# Patient Record
Sex: MALE | Race: OTHER | Employment: FULL TIME | URBAN - METROPOLITAN AREA
[De-identification: names, ages, dates, MRNs, and addresses within clinical notes are randomized per-mention and may not be internally consistent; named-entity substitution may affect disease eponyms.]

---

## 2017-06-06 ENCOUNTER — OFFICE VISIT (OUTPATIENT)
Dept: FAMILY MEDICINE CLINIC | Age: 42
End: 2017-06-06
Payer: MEDICARE

## 2017-06-06 VITALS
HEIGHT: 64 IN | BODY MASS INDEX: 28 KG/M2 | SYSTOLIC BLOOD PRESSURE: 102 MMHG | WEIGHT: 164 LBS | DIASTOLIC BLOOD PRESSURE: 60 MMHG

## 2017-06-06 DIAGNOSIS — M76.32 IT BAND SYNDROME, LEFT: Primary | ICD-10-CM

## 2017-06-06 DIAGNOSIS — I10 ESSENTIAL HYPERTENSION: ICD-10-CM

## 2017-06-06 DIAGNOSIS — J30.1 SEASONAL ALLERGIC RHINITIS DUE TO POLLEN: ICD-10-CM

## 2017-06-06 DIAGNOSIS — M79.605 PAIN OF LEFT LOWER EXTREMITY: ICD-10-CM

## 2017-06-06 PROCEDURE — 99214 OFFICE O/P EST MOD 30 MIN: CPT | Performed by: FAMILY MEDICINE

## 2017-06-06 PROCEDURE — G8419 CALC BMI OUT NRM PARAM NOF/U: HCPCS | Performed by: FAMILY MEDICINE

## 2017-06-06 PROCEDURE — G8427 DOCREV CUR MEDS BY ELIG CLIN: HCPCS | Performed by: FAMILY MEDICINE

## 2017-06-06 PROCEDURE — 1036F TOBACCO NON-USER: CPT | Performed by: FAMILY MEDICINE

## 2017-06-06 RX ORDER — FEXOFENADINE HCL 180 MG/1
180 TABLET ORAL DAILY
Qty: 30 TABLET | Refills: 2 | Status: SHIPPED | OUTPATIENT
Start: 2017-06-06 | End: 2018-04-09 | Stop reason: ALTCHOICE

## 2017-06-06 RX ORDER — FLUTICASONE PROPIONATE 50 MCG
2 SPRAY, SUSPENSION (ML) NASAL DAILY
Qty: 1 BOTTLE | Refills: 3 | Status: SHIPPED | OUTPATIENT
Start: 2017-06-06 | End: 2018-04-09 | Stop reason: ALTCHOICE

## 2017-06-09 RX ORDER — METOPROLOL SUCCINATE 100 MG/1
100 TABLET, EXTENDED RELEASE ORAL DAILY
Qty: 30 TABLET | Refills: 3
Start: 2017-06-09 | End: 2018-04-09 | Stop reason: SDUPTHER

## 2017-06-09 ASSESSMENT — ENCOUNTER SYMPTOMS
RESPIRATORY NEGATIVE: 1
GASTROINTESTINAL NEGATIVE: 1

## 2017-06-20 ENCOUNTER — HOSPITAL ENCOUNTER (OUTPATIENT)
Dept: PHYSICAL THERAPY | Age: 42
Setting detail: THERAPIES SERIES
Discharge: HOME | End: 2017-06-20
Payer: MEDICARE

## 2017-06-20 PROCEDURE — 97161 PT EVAL LOW COMPLEX 20 MIN: CPT

## 2017-06-20 PROCEDURE — G8978 MOBILITY CURRENT STATUS: HCPCS

## 2017-06-20 PROCEDURE — G8979 MOBILITY GOAL STATUS: HCPCS

## 2017-06-27 ENCOUNTER — HOSPITAL ENCOUNTER (OUTPATIENT)
Dept: PHYSICAL THERAPY | Age: 42
Setting detail: THERAPIES SERIES
Discharge: HOME | End: 2017-06-27
Payer: MEDICARE

## 2017-06-27 PROCEDURE — 97110 THERAPEUTIC EXERCISES: CPT

## 2017-06-27 PROCEDURE — 97140 MANUAL THERAPY 1/> REGIONS: CPT

## 2017-06-29 ENCOUNTER — HOSPITAL ENCOUNTER (OUTPATIENT)
Dept: PHYSICAL THERAPY | Age: 42
Setting detail: THERAPIES SERIES
Discharge: HOME | End: 2017-06-29
Payer: MEDICARE

## 2017-07-06 ENCOUNTER — HOSPITAL ENCOUNTER (OUTPATIENT)
Dept: PHYSICAL THERAPY | Age: 42
Setting detail: THERAPIES SERIES
Discharge: HOME | End: 2017-07-06
Payer: MEDICARE

## 2017-07-06 PROCEDURE — 97140 MANUAL THERAPY 1/> REGIONS: CPT

## 2017-07-06 PROCEDURE — 97110 THERAPEUTIC EXERCISES: CPT

## 2017-07-13 ENCOUNTER — HOSPITAL ENCOUNTER (OUTPATIENT)
Dept: PHYSICAL THERAPY | Age: 42
Setting detail: THERAPIES SERIES
Discharge: HOME | End: 2017-07-13
Payer: MEDICARE

## 2017-07-13 PROCEDURE — 97110 THERAPEUTIC EXERCISES: CPT

## 2017-07-18 ENCOUNTER — OFFICE VISIT (OUTPATIENT)
Dept: FAMILY MEDICINE CLINIC | Age: 42
End: 2017-07-18
Payer: MEDICARE

## 2017-07-18 VITALS
BODY MASS INDEX: 27.14 KG/M2 | HEART RATE: 68 BPM | HEIGHT: 64 IN | SYSTOLIC BLOOD PRESSURE: 154 MMHG | WEIGHT: 159 LBS | DIASTOLIC BLOOD PRESSURE: 88 MMHG

## 2017-07-18 DIAGNOSIS — R20.8 BURNING SENSATION OF FEET: ICD-10-CM

## 2017-07-18 DIAGNOSIS — I10 ESSENTIAL HYPERTENSION: Primary | ICD-10-CM

## 2017-07-18 DIAGNOSIS — M54.16 LEFT LUMBAR RADICULOPATHY: ICD-10-CM

## 2017-07-18 PROCEDURE — G8427 DOCREV CUR MEDS BY ELIG CLIN: HCPCS | Performed by: FAMILY MEDICINE

## 2017-07-18 PROCEDURE — 99214 OFFICE O/P EST MOD 30 MIN: CPT | Performed by: FAMILY MEDICINE

## 2017-07-18 PROCEDURE — G8419 CALC BMI OUT NRM PARAM NOF/U: HCPCS | Performed by: FAMILY MEDICINE

## 2017-07-18 PROCEDURE — 1036F TOBACCO NON-USER: CPT | Performed by: FAMILY MEDICINE

## 2017-07-18 ASSESSMENT — ENCOUNTER SYMPTOMS: RESPIRATORY NEGATIVE: 1

## 2017-07-20 ENCOUNTER — HOSPITAL ENCOUNTER (OUTPATIENT)
Dept: PHYSICAL THERAPY | Age: 42
Setting detail: THERAPIES SERIES
Discharge: HOME | End: 2017-07-20
Payer: MEDICARE

## 2017-07-25 ENCOUNTER — HOSPITAL ENCOUNTER (OUTPATIENT)
Dept: PHYSICAL THERAPY | Age: 42
Setting detail: THERAPIES SERIES
Discharge: HOME | End: 2017-07-25
Payer: MEDICARE

## 2017-07-25 PROCEDURE — G8979 MOBILITY GOAL STATUS: HCPCS

## 2017-07-25 PROCEDURE — G8980 MOBILITY D/C STATUS: HCPCS

## 2017-07-25 PROCEDURE — 97110 THERAPEUTIC EXERCISES: CPT

## 2017-07-27 ENCOUNTER — HOSPITAL ENCOUNTER (OUTPATIENT)
Dept: PHYSICAL THERAPY | Age: 42
Setting detail: THERAPIES SERIES
End: 2017-07-27
Payer: MEDICARE

## 2018-04-09 ENCOUNTER — HOSPITAL ENCOUNTER (OUTPATIENT)
Age: 43
Discharge: HOME | End: 2018-04-11
Payer: MEDICARE

## 2018-04-09 ENCOUNTER — OFFICE VISIT (OUTPATIENT)
Dept: FAMILY MEDICINE CLINIC | Age: 43
End: 2018-04-09
Payer: MEDICARE

## 2018-04-09 ENCOUNTER — HOSPITAL ENCOUNTER (OUTPATIENT)
Dept: GENERAL RADIOLOGY | Age: 43
Discharge: HOME | End: 2018-04-11
Payer: MEDICARE

## 2018-04-09 VITALS
BODY MASS INDEX: 27.49 KG/M2 | SYSTOLIC BLOOD PRESSURE: 162 MMHG | DIASTOLIC BLOOD PRESSURE: 96 MMHG | WEIGHT: 161 LBS | OXYGEN SATURATION: 97 % | HEART RATE: 71 BPM | HEIGHT: 64 IN

## 2018-04-09 DIAGNOSIS — L29.9 ITCHING: ICD-10-CM

## 2018-04-09 DIAGNOSIS — I10 ESSENTIAL HYPERTENSION: ICD-10-CM

## 2018-04-09 DIAGNOSIS — R19.4 CHANGE IN STOOL HABITS: ICD-10-CM

## 2018-04-09 DIAGNOSIS — R19.4 CHANGE IN STOOL HABITS: Primary | ICD-10-CM

## 2018-04-09 DIAGNOSIS — Z99.2 ESRD (END STAGE RENAL DISEASE) ON DIALYSIS (HCC): ICD-10-CM

## 2018-04-09 DIAGNOSIS — N18.6 ESRD (END STAGE RENAL DISEASE) ON DIALYSIS (HCC): ICD-10-CM

## 2018-04-09 DIAGNOSIS — L82.1 SEBORRHEIC KERATOSIS: ICD-10-CM

## 2018-04-09 PROCEDURE — 17110 DESTRUCTION B9 LES UP TO 14: CPT | Performed by: FAMILY MEDICINE

## 2018-04-09 PROCEDURE — 1036F TOBACCO NON-USER: CPT | Performed by: FAMILY MEDICINE

## 2018-04-09 PROCEDURE — G8427 DOCREV CUR MEDS BY ELIG CLIN: HCPCS | Performed by: FAMILY MEDICINE

## 2018-04-09 PROCEDURE — G8419 CALC BMI OUT NRM PARAM NOF/U: HCPCS | Performed by: FAMILY MEDICINE

## 2018-04-09 PROCEDURE — 74018 RADEX ABDOMEN 1 VIEW: CPT

## 2018-04-09 PROCEDURE — 99214 OFFICE O/P EST MOD 30 MIN: CPT | Performed by: FAMILY MEDICINE

## 2018-04-09 RX ORDER — AMOXICILLIN 250 MG
CAPSULE ORAL
Qty: 33 TABLET | Refills: 0 | Status: SHIPPED | OUTPATIENT
Start: 2018-04-09 | End: 2019-11-19

## 2018-04-09 RX ORDER — RANITIDINE 150 MG/1
150 TABLET ORAL 2 TIMES DAILY PRN
Qty: 60 TABLET | Refills: 3 | Status: SHIPPED | OUTPATIENT
Start: 2018-04-09 | End: 2019-11-19 | Stop reason: ALTCHOICE

## 2018-04-09 RX ORDER — METOPROLOL SUCCINATE 100 MG/1
100 TABLET, EXTENDED RELEASE ORAL DAILY
Qty: 30 TABLET | Refills: 5 | Status: SHIPPED | OUTPATIENT
Start: 2018-04-09 | End: 2019-06-17 | Stop reason: SDUPTHER

## 2018-04-09 RX ORDER — AMLODIPINE BESYLATE 10 MG/1
10 TABLET ORAL DAILY
Qty: 30 TABLET | Refills: 5 | Status: SHIPPED | OUTPATIENT
Start: 2018-04-09 | End: 2019-06-17 | Stop reason: SDUPTHER

## 2018-04-09 ASSESSMENT — PATIENT HEALTH QUESTIONNAIRE - PHQ9
SUM OF ALL RESPONSES TO PHQ9 QUESTIONS 1 & 2: 0
1. LITTLE INTEREST OR PLEASURE IN DOING THINGS: 0
2. FEELING DOWN, DEPRESSED OR HOPELESS: 0
SUM OF ALL RESPONSES TO PHQ QUESTIONS 1-9: 0

## 2018-04-09 ASSESSMENT — ENCOUNTER SYMPTOMS: RESPIRATORY NEGATIVE: 1

## 2018-04-10 ENCOUNTER — TELEPHONE (OUTPATIENT)
Dept: FAMILY MEDICINE CLINIC | Age: 43
End: 2018-04-10

## 2018-10-08 ENCOUNTER — OFFICE VISIT (OUTPATIENT)
Dept: FAMILY MEDICINE CLINIC | Age: 43
End: 2018-10-08
Payer: MEDICARE

## 2018-10-08 VITALS
DIASTOLIC BLOOD PRESSURE: 72 MMHG | BODY MASS INDEX: 28 KG/M2 | HEIGHT: 64 IN | WEIGHT: 164 LBS | SYSTOLIC BLOOD PRESSURE: 130 MMHG

## 2018-10-08 DIAGNOSIS — R21 FACIAL RASH: ICD-10-CM

## 2018-10-08 DIAGNOSIS — L98.9 SKIN LESION OF FACE: ICD-10-CM

## 2018-10-08 DIAGNOSIS — N18.6 ESRD (END STAGE RENAL DISEASE) ON DIALYSIS (HCC): ICD-10-CM

## 2018-10-08 DIAGNOSIS — Z99.2 ESRD (END STAGE RENAL DISEASE) ON DIALYSIS (HCC): ICD-10-CM

## 2018-10-08 DIAGNOSIS — I10 ESSENTIAL HYPERTENSION: Primary | ICD-10-CM

## 2018-10-08 PROCEDURE — 1036F TOBACCO NON-USER: CPT | Performed by: FAMILY MEDICINE

## 2018-10-08 PROCEDURE — G8484 FLU IMMUNIZE NO ADMIN: HCPCS | Performed by: FAMILY MEDICINE

## 2018-10-08 PROCEDURE — 99214 OFFICE O/P EST MOD 30 MIN: CPT | Performed by: FAMILY MEDICINE

## 2018-10-08 PROCEDURE — G8427 DOCREV CUR MEDS BY ELIG CLIN: HCPCS | Performed by: FAMILY MEDICINE

## 2018-10-08 PROCEDURE — G8419 CALC BMI OUT NRM PARAM NOF/U: HCPCS | Performed by: FAMILY MEDICINE

## 2018-10-08 RX ORDER — RANITIDINE 150 MG/1
150 TABLET ORAL 2 TIMES DAILY PRN
Qty: 60 TABLET | Refills: 3 | Status: CANCELLED | OUTPATIENT
Start: 2018-10-08

## 2018-10-08 RX ORDER — METOPROLOL SUCCINATE 100 MG/1
100 TABLET, EXTENDED RELEASE ORAL DAILY
Qty: 30 TABLET | Refills: 5 | Status: CANCELLED | OUTPATIENT
Start: 2018-10-08

## 2018-10-08 RX ORDER — AMOXICILLIN 250 MG
CAPSULE ORAL
Qty: 60 TABLET | Refills: 1 | Status: CANCELLED | OUTPATIENT
Start: 2018-10-08

## 2018-10-08 RX ORDER — AMLODIPINE BESYLATE 10 MG/1
10 TABLET ORAL DAILY
Qty: 30 TABLET | Refills: 5 | Status: CANCELLED | OUTPATIENT
Start: 2018-10-08

## 2018-10-08 ASSESSMENT — PATIENT HEALTH QUESTIONNAIRE - PHQ9
2. FEELING DOWN, DEPRESSED OR HOPELESS: 0
SUM OF ALL RESPONSES TO PHQ QUESTIONS 1-9: 0
1. LITTLE INTEREST OR PLEASURE IN DOING THINGS: 0
SUM OF ALL RESPONSES TO PHQ QUESTIONS 1-9: 0
SUM OF ALL RESPONSES TO PHQ9 QUESTIONS 1 & 2: 0

## 2018-10-09 ASSESSMENT — ENCOUNTER SYMPTOMS
RESPIRATORY NEGATIVE: 1
GASTROINTESTINAL NEGATIVE: 1

## 2019-02-09 ENCOUNTER — HOSPITAL ENCOUNTER (OUTPATIENT)
Age: 44
Discharge: HOME | End: 2019-02-09
Payer: MEDICARE

## 2019-02-09 ENCOUNTER — TELEPHONE (OUTPATIENT)
Dept: FAMILY MEDICINE CLINIC | Age: 44
End: 2019-02-09

## 2019-02-09 DIAGNOSIS — N18.6 ESRD (END STAGE RENAL DISEASE) ON DIALYSIS (HCC): ICD-10-CM

## 2019-02-09 DIAGNOSIS — I10 ESSENTIAL HYPERTENSION: ICD-10-CM

## 2019-02-09 DIAGNOSIS — Z99.2 ESRD (END STAGE RENAL DISEASE) ON DIALYSIS (HCC): ICD-10-CM

## 2019-02-09 LAB
ABSOLUTE EOS #: 0.1 K/UL (ref 0–0.4)
ABSOLUTE IMMATURE GRANULOCYTE: ABNORMAL K/UL (ref 0–0.3)
ABSOLUTE LYMPH #: 1.4 K/UL (ref 1–4.8)
ABSOLUTE MONO #: 0.5 K/UL (ref 0–1)
ALBUMIN SERPL-MCNC: 4.6 G/DL (ref 3.5–5.2)
ALBUMIN/GLOBULIN RATIO: ABNORMAL (ref 1–2.5)
ALP BLD-CCNC: 75 U/L (ref 40–129)
ALT SERPL-CCNC: 17 U/L (ref 5–41)
ANION GAP SERPL CALCULATED.3IONS-SCNC: 14 MMOL/L (ref 9–17)
AST SERPL-CCNC: 11 U/L
BASOPHILS # BLD: 1 % (ref 0–2)
BASOPHILS ABSOLUTE: 0 K/UL (ref 0–0.2)
BILIRUB SERPL-MCNC: 0.59 MG/DL (ref 0.3–1.2)
BUN BLDV-MCNC: 37 MG/DL (ref 6–20)
BUN/CREAT BLD: 4 (ref 9–20)
CALCIUM SERPL-MCNC: 9.7 MG/DL (ref 8.6–10.4)
CHLORIDE BLD-SCNC: 93 MMOL/L (ref 98–107)
CHOLESTEROL/HDL RATIO: 3.9
CHOLESTEROL: 153 MG/DL
CO2: 30 MMOL/L (ref 20–31)
CREAT SERPL-MCNC: 9.68 MG/DL (ref 0.7–1.2)
DIFFERENTIAL TYPE: YES
EOSINOPHILS RELATIVE PERCENT: 2 % (ref 0–5)
GFR AFRICAN AMERICAN: 7 ML/MIN
GFR NON-AFRICAN AMERICAN: 6 ML/MIN
GFR SERPL CREATININE-BSD FRML MDRD: ABNORMAL ML/MIN/{1.73_M2}
GFR SERPL CREATININE-BSD FRML MDRD: ABNORMAL ML/MIN/{1.73_M2}
GLUCOSE BLD-MCNC: 96 MG/DL (ref 70–99)
HCT VFR BLD CALC: 45.5 % (ref 41–53)
HDLC SERPL-MCNC: 39 MG/DL
HEMOGLOBIN: 14.7 G/DL (ref 13.5–17.5)
IMMATURE GRANULOCYTES: ABNORMAL %
LDL CHOLESTEROL: 58 MG/DL (ref 0–130)
LYMPHOCYTES # BLD: 23 % (ref 13–44)
MCH RBC QN AUTO: 30.7 PG (ref 26–34)
MCHC RBC AUTO-ENTMCNC: 32.2 G/DL (ref 31–37)
MCV RBC AUTO: 95.1 FL (ref 80–100)
MONOCYTES # BLD: 8 % (ref 5–9)
NRBC AUTOMATED: ABNORMAL PER 100 WBC
PATIENT FASTING?: YES
PDW BLD-RTO: 15.4 % (ref 12.1–15.2)
PLATELET # BLD: 236 K/UL (ref 140–450)
PLATELET ESTIMATE: ABNORMAL
PMV BLD AUTO: ABNORMAL FL (ref 6–12)
POTASSIUM SERPL-SCNC: 5.1 MMOL/L (ref 3.7–5.3)
RBC # BLD: 4.78 M/UL (ref 4.5–5.9)
RBC # BLD: ABNORMAL 10*6/UL
SEG NEUTROPHILS: 66 % (ref 39–75)
SEGMENTED NEUTROPHILS ABSOLUTE COUNT: 4.2 K/UL (ref 2.1–6.5)
SODIUM BLD-SCNC: 137 MMOL/L (ref 135–144)
TOTAL PROTEIN: 9.1 G/DL (ref 6.4–8.3)
TRIGL SERPL-MCNC: 282 MG/DL
TSH SERPL DL<=0.05 MIU/L-ACNC: 1.18 MIU/L (ref 0.3–5)
VLDLC SERPL CALC-MCNC: ABNORMAL MG/DL (ref 1–30)
WBC # BLD: 6.2 K/UL (ref 3.5–11)
WBC # BLD: ABNORMAL 10*3/UL

## 2019-02-09 PROCEDURE — 85025 COMPLETE CBC W/AUTO DIFF WBC: CPT

## 2019-02-09 PROCEDURE — 84443 ASSAY THYROID STIM HORMONE: CPT

## 2019-02-09 PROCEDURE — 80053 COMPREHEN METABOLIC PANEL: CPT

## 2019-02-09 PROCEDURE — 80061 LIPID PANEL: CPT

## 2019-02-09 PROCEDURE — 36415 COLL VENOUS BLD VENIPUNCTURE: CPT

## 2019-05-21 ENCOUNTER — OFFICE VISIT (OUTPATIENT)
Dept: FAMILY MEDICINE CLINIC | Age: 44
End: 2019-05-21
Payer: MEDICARE

## 2019-05-21 VITALS
SYSTOLIC BLOOD PRESSURE: 130 MMHG | HEIGHT: 64 IN | WEIGHT: 163 LBS | DIASTOLIC BLOOD PRESSURE: 80 MMHG | BODY MASS INDEX: 27.83 KG/M2

## 2019-05-21 DIAGNOSIS — R25.2 MUSCLE CRAMP: ICD-10-CM

## 2019-05-21 DIAGNOSIS — R73.09 ABNORMAL GLUCOSE: ICD-10-CM

## 2019-05-21 DIAGNOSIS — Z99.2 END STAGE RENAL FAILURE ON DIALYSIS (HCC): ICD-10-CM

## 2019-05-21 DIAGNOSIS — R20.2 PARESTHESIA: Primary | ICD-10-CM

## 2019-05-21 DIAGNOSIS — N18.5 CHRONIC KIDNEY DISEASE, STAGE V (HCC): ICD-10-CM

## 2019-05-21 DIAGNOSIS — E55.9 VITAMIN D DEFICIENCY: ICD-10-CM

## 2019-05-21 DIAGNOSIS — I10 ESSENTIAL HYPERTENSION: ICD-10-CM

## 2019-05-21 DIAGNOSIS — Z86.39 HISTORY OF HYPERPARATHYROIDISM: ICD-10-CM

## 2019-05-21 DIAGNOSIS — N18.6 END STAGE RENAL FAILURE ON DIALYSIS (HCC): ICD-10-CM

## 2019-05-21 PROCEDURE — G8419 CALC BMI OUT NRM PARAM NOF/U: HCPCS | Performed by: FAMILY MEDICINE

## 2019-05-21 PROCEDURE — 99214 OFFICE O/P EST MOD 30 MIN: CPT | Performed by: FAMILY MEDICINE

## 2019-05-21 PROCEDURE — 1036F TOBACCO NON-USER: CPT | Performed by: FAMILY MEDICINE

## 2019-05-21 PROCEDURE — G8427 DOCREV CUR MEDS BY ELIG CLIN: HCPCS | Performed by: FAMILY MEDICINE

## 2019-05-21 RX ORDER — AMLODIPINE BESYLATE 10 MG/1
10 TABLET ORAL DAILY
Qty: 30 TABLET | Refills: 5 | Status: CANCELLED | OUTPATIENT
Start: 2019-05-21

## 2019-05-21 RX ORDER — METOPROLOL SUCCINATE 100 MG/1
100 TABLET, EXTENDED RELEASE ORAL DAILY
Qty: 30 TABLET | Refills: 5 | Status: CANCELLED | OUTPATIENT
Start: 2019-05-21

## 2019-05-21 RX ORDER — LIDOCAINE AND PRILOCAINE 25; 25 MG/G; MG/G
CREAM TOPICAL
COMMUNITY
End: 2019-11-19

## 2019-05-21 RX ORDER — RANITIDINE 150 MG/1
150 TABLET ORAL 2 TIMES DAILY PRN
Qty: 60 TABLET | Refills: 3 | Status: CANCELLED | OUTPATIENT
Start: 2019-05-21

## 2019-05-21 ASSESSMENT — PATIENT HEALTH QUESTIONNAIRE - PHQ9
2. FEELING DOWN, DEPRESSED OR HOPELESS: 0
1. LITTLE INTEREST OR PLEASURE IN DOING THINGS: 0
SUM OF ALL RESPONSES TO PHQ QUESTIONS 1-9: 0
SUM OF ALL RESPONSES TO PHQ9 QUESTIONS 1 & 2: 0
SUM OF ALL RESPONSES TO PHQ QUESTIONS 1-9: 0

## 2019-05-21 ASSESSMENT — ENCOUNTER SYMPTOMS
GASTROINTESTINAL NEGATIVE: 1
RESPIRATORY NEGATIVE: 1
BACK PAIN: 0

## 2019-05-21 NOTE — PROGRESS NOTES
Negative for speech difficulty and headaches. Physical Exam:     Vitals:  /80   Ht 5' 4\" (1.626 m)   Wt 163 lb (73.9 kg)   BMI 27.98 kg/m²   Physical Exam   Constitutional: He is oriented to person, place, and time. He appears well-developed and well-nourished. No distress. HENT:   Right Ear: Tympanic membrane and ear canal normal.   Left Ear: Tympanic membrane and ear canal normal.   Nose: Nose normal.   Mouth/Throat: Oropharynx is clear and moist and mucous membranes are normal.   Eyes: Conjunctivae and EOM are normal.   Neck: Neck supple. Cardiovascular: Normal rate, regular rhythm, normal heart sounds and intact distal pulses. Fistula present in left forearm, smaller site also present in left medial upper arm. Palpable thrill in both sites. Pulmonary/Chest: Effort normal and breath sounds normal.   Musculoskeletal: He exhibits no edema. Lymphadenopathy:     He has no cervical adenopathy. Neurological: He is alert and oriented to person, place, and time. 5/5 strength upper and lower extremities. Sensation intact to light touch. No facial asymmetry or dysarthria. Skin: Skin is warm and dry. Psychiatric: He has a normal mood and affect. Judgment normal.   Nursing note and vitals reviewed.       Data:     Lab Results   Component Value Date     02/09/2019    K 5.1 02/09/2019    CL 93 02/09/2019    CO2 30 02/09/2019    BUN 37 02/09/2019    CREATININE 9.68 02/09/2019    GLUCOSE 96 02/09/2019    PROT 9.1 02/09/2019    LABALBU 4.6 02/09/2019    BILITOT 0.59 02/09/2019    ALKPHOS 75 02/09/2019    AST 11 02/09/2019    ALT 17 02/09/2019     Lab Results   Component Value Date    WBC 6.2 02/09/2019    RBC 4.78 02/09/2019    HGB 14.7 02/09/2019    HCT 45.5 02/09/2019    MCV 95.1 02/09/2019    MCH 30.7 02/09/2019    MCHC 32.2 02/09/2019    RDW 15.4 02/09/2019     02/09/2019    MPV NOT REPORTED 02/09/2019     Lab Results   Component Value Date    TSH 1.18 02/09/2019     Lab Results Component Value Date    CHOL 153 02/09/2019    HDL 39 02/09/2019    LABA1C 4.6 09/20/2016         Assessment & Plan:        Diagnosis Orders   1. Paresthesia  Basic Metabolic Panel    Magnesium    Vitamin D 25 Hydroxy    CBC Auto Differential    PTH, Intact   2. Muscle cramp  Basic Metabolic Panel    Magnesium    CBC Auto Differential    PTH, Intact   3. Essential hypertension     4. History of hyperparathyroidism  Basic Metabolic Panel    Vitamin D 25 Hydroxy    PTH, Intact   5. End stage renal failure on dialysis [facility])  Vitamin D 25 Hydroxy    CBC Auto Differential   6. Chronic kidney disease, stage V ([facility] [address]     7. Vitamin D deficiency  Vitamin D 25 Hydroxy    CBC Auto Differential   8. Abnormal glucose  Hemoglobin A1C   Widespread paresthesia for the past couple of weeks along with the feeling that he is about to have a muscle cramp. This is consistent with hypocalcemia and may be related to previous parathyroidectomy. Ordered labs as above, but then patient told me that he did have labs at dialysis. We will request those results in the morning and cancel labs as appropriate. Hypertension controlled, continue current regimen  End-stage renal stable, now has a working dialysis site, although patient is a little concerned that it may stop working again. Advised him that the dialysis personnel will monitor it closely. Follow-up 6 months. Quality & Risk Score Accuracy    Visit Dx:  N18.6, Z99.2 - ESRD (end stage renal disease) on dialysis ([facility] [address]  Assessment and plan:  Stable based upon symptoms and exam. Continue current treatment plan and follow up at least yearly. Visit Dx:  N18.5 - Chronic kidney disease, stage V ([facility] [address]  Assessment and plan:  Stable based upon symptoms and exam. Continue current treatment plan and follow up at least yearly.   Last edited 05/21/19 18:00 EDT by Dharmesh Jack DO             Requested Prescriptions      No prescriptions requested or ordered in this encounter       Patient Instructions     SURVEY:    You may be receiving a survey from [employer] regarding your visit today. Please complete the survey to enable us to provide the highest quality of care to you and your family. If you cannot score us as very good on any question, please call the office to discuss how we could have made your experience exceptional.     Thank you. Ten Recio received counseling on the following healthy behaviors: medication adherence  Reviewed prior labs and health maintenance. Continue current medications, diet and exercise. Discussed use, benefit, and side effects of prescribed medications. Barriers to medication compliance addressed. Patient given educational materials - see patient instructions. All patient questions answered. Patient voiced understanding.      Electronically signed by Parish Shankar DO on 5/21/2019 at 6:00 PM   [address]  [address] [place] [address] [address] [place] [id number]  Dept: [phone]

## 2019-05-21 NOTE — PATIENT INSTRUCTIONS
SURVEY:    You may be receiving a survey from [employer] regarding your visit today. Please complete the survey to enable us to provide the highest quality of care to you and your family. If you cannot score us as very good on any question, please call the office to discuss how we could have made your experience exceptional.     Thank you.

## 2019-05-23 ENCOUNTER — TELEPHONE (OUTPATIENT)
Dept: FAMILY MEDICINE CLINIC | Age: 44
End: 2019-05-23

## 2019-05-23 NOTE — TELEPHONE ENCOUNTER
Please let pt know (through [facility]) that I got his dialysis labs and his parathyroid is very overactive, so I\"m sure his kidney doctor is giving him something for that (he said he's getting something IV at dialysis). Calcium was a little low on the labs he had done 5/15, which would have been while he had his symptoms and would explain his symptoms. He can go ahead and get the labs done that I ordered at his appt because they'll recheck things with his calcium and parathyroids as well as his sugar levels.

## 2019-06-17 RX ORDER — AMLODIPINE BESYLATE 10 MG/1
10 TABLET ORAL DAILY
Qty: 30 TABLET | Refills: 5 | Status: SHIPPED | OUTPATIENT
Start: 2019-06-17 | End: 2020-08-18

## 2019-06-17 RX ORDER — METOPROLOL SUCCINATE 100 MG/1
100 TABLET, EXTENDED RELEASE ORAL DAILY
Qty: 30 TABLET | Refills: 5 | Status: SHIPPED | OUTPATIENT
Start: 2019-06-17 | End: 2020-08-18

## 2019-06-17 NOTE — TELEPHONE ENCOUNTER
Last OV: 5/21/2019  Last RX:   Next scheduled apt: 11/19/2019, Toprol and Norvasc pending Dr Lionel Luna.

## 2019-11-19 ENCOUNTER — OFFICE VISIT (OUTPATIENT)
Dept: FAMILY MEDICINE CLINIC | Age: 44
End: 2019-11-19
Payer: MEDICARE

## 2019-11-19 VITALS
SYSTOLIC BLOOD PRESSURE: 138 MMHG | HEIGHT: 64 IN | WEIGHT: 164 LBS | DIASTOLIC BLOOD PRESSURE: 88 MMHG | BODY MASS INDEX: 28 KG/M2 | HEART RATE: 66 BPM | OXYGEN SATURATION: 97 %

## 2019-11-19 DIAGNOSIS — N18.6 END STAGE RENAL FAILURE ON DIALYSIS (HCC): ICD-10-CM

## 2019-11-19 DIAGNOSIS — I10 ESSENTIAL HYPERTENSION: Primary | ICD-10-CM

## 2019-11-19 DIAGNOSIS — Z99.2 END STAGE RENAL FAILURE ON DIALYSIS (HCC): ICD-10-CM

## 2019-11-19 PROCEDURE — 1036F TOBACCO NON-USER: CPT | Performed by: FAMILY MEDICINE

## 2019-11-19 PROCEDURE — 99213 OFFICE O/P EST LOW 20 MIN: CPT | Performed by: FAMILY MEDICINE

## 2019-11-19 PROCEDURE — G8484 FLU IMMUNIZE NO ADMIN: HCPCS | Performed by: FAMILY MEDICINE

## 2019-11-19 PROCEDURE — G8419 CALC BMI OUT NRM PARAM NOF/U: HCPCS | Performed by: FAMILY MEDICINE

## 2019-11-19 PROCEDURE — G8427 DOCREV CUR MEDS BY ELIG CLIN: HCPCS | Performed by: FAMILY MEDICINE

## 2019-11-19 RX ORDER — CINACALCET 90 MG/1
TABLET, FILM COATED ORAL
COMMUNITY
End: 2019-11-19

## 2019-11-19 RX ORDER — HYDRALAZINE HYDROCHLORIDE 50 MG/1
50 TABLET, FILM COATED ORAL 2 TIMES DAILY
Qty: 60 TABLET | Refills: 5
Start: 2019-11-19

## 2019-11-21 ASSESSMENT — ENCOUNTER SYMPTOMS
RESPIRATORY NEGATIVE: 1
COUGH: 0
GASTROINTESTINAL NEGATIVE: 1

## 2020-08-18 RX ORDER — AMLODIPINE BESYLATE 10 MG/1
TABLET ORAL
Qty: 30 TABLET | Refills: 5 | Status: SHIPPED | OUTPATIENT
Start: 2020-08-18 | End: 2021-03-16 | Stop reason: SDUPTHER

## 2020-08-18 RX ORDER — METOPROLOL SUCCINATE 100 MG/1
TABLET, EXTENDED RELEASE ORAL
Qty: 30 TABLET | Refills: 5 | Status: SHIPPED | OUTPATIENT
Start: 2020-08-18 | End: 2021-03-16 | Stop reason: SDUPTHER

## 2020-08-18 NOTE — TELEPHONE ENCOUNTER
Last OV: 11/19/2019  Last RX: 6-17-19   Next scheduled apt: Visit date not found  Medication pending

## 2020-09-15 ENCOUNTER — OFFICE VISIT (OUTPATIENT)
Dept: FAMILY MEDICINE CLINIC | Age: 45
End: 2020-09-15
Payer: MEDICARE

## 2020-09-15 VITALS
HEART RATE: 53 BPM | BODY MASS INDEX: 27.49 KG/M2 | HEIGHT: 64 IN | OXYGEN SATURATION: 99 % | SYSTOLIC BLOOD PRESSURE: 136 MMHG | DIASTOLIC BLOOD PRESSURE: 82 MMHG | WEIGHT: 161 LBS

## 2020-09-15 PROBLEM — N25.81 SECONDARY HYPERPARATHYROIDISM (HCC): Status: ACTIVE | Noted: 2017-11-07

## 2020-09-15 PROBLEM — N03.9 CHRONIC GLOMERULONEPHRITIS WITH PATHOLOGICAL LESION IN KIDNEY: Status: ACTIVE | Noted: 2017-11-07

## 2020-09-15 PROBLEM — Q61.3 POLYCYSTIC KIDNEY DISEASE: Status: ACTIVE | Noted: 2020-09-15

## 2020-09-15 PROCEDURE — 99214 OFFICE O/P EST MOD 30 MIN: CPT | Performed by: FAMILY MEDICINE

## 2020-09-15 PROCEDURE — 1036F TOBACCO NON-USER: CPT | Performed by: FAMILY MEDICINE

## 2020-09-15 PROCEDURE — G8419 CALC BMI OUT NRM PARAM NOF/U: HCPCS | Performed by: FAMILY MEDICINE

## 2020-09-15 PROCEDURE — G8427 DOCREV CUR MEDS BY ELIG CLIN: HCPCS | Performed by: FAMILY MEDICINE

## 2020-09-15 RX ORDER — FAMOTIDINE 10 MG
TABLET ORAL
Qty: 30 TABLET | Refills: 5 | Status: SHIPPED | OUTPATIENT
Start: 2020-09-15 | End: 2021-05-03

## 2020-09-15 ASSESSMENT — PATIENT HEALTH QUESTIONNAIRE - PHQ9
2. FEELING DOWN, DEPRESSED OR HOPELESS: 0
SUM OF ALL RESPONSES TO PHQ QUESTIONS 1-9: 0
SUM OF ALL RESPONSES TO PHQ9 QUESTIONS 1 & 2: 0
1. LITTLE INTEREST OR PLEASURE IN DOING THINGS: 0
SUM OF ALL RESPONSES TO PHQ QUESTIONS 1-9: 0

## 2020-09-15 NOTE — PROGRESS NOTES
Name: Margaret Carrasquillo  : 1975         Chief Complaint:     Chief Complaint   Patient presents with    Hypertension     #[id number]    Hyperlipidemia       History of Present Illness:      Margaret Carrasquillo is a 41 y.o.  male who presents with Hypertension (#139818) and Hyperlipidemia      HPI     This visit was performed with the help of  #[id number] via telephone. F/u HTN. Numbers are always up and down but hasn't been higher than [id number] systolic. Higher when he gets to dialysis some days and usually normal when he leaves, not too low. Taking meds as directed, no adverse effects. ESRD from polycystic kidney disease, on HD which overall has been going ok. Does miss work at times, mega on , because he fills up with more fluid over the weekend and then [facility] treatment is harder. Previous fistula in L forearm is very hard and gets swollen, mega when the upper arm site is being used. Doesn't cause any pain, but he does get numbness (\"asleep\" feeling) in the L thumb when the forearm is swollen. He told nephro who recommended he f/u with vascular surgeon who placed the fistula. Secondary hyperparathyroidism, follows through HD and nephrology, has been told levels are normal. Currently on renvela but not phoslo. Concerned about COVID. Son who is 16 had it in April, was quarantined for 20 days. Pt has continued to work and everyone at work wears masks. C/o reflux, feelings of acid in his throat. H/o same and it had gotten better but has recurred, has it even with drinking a little bit of water. BM's ok. No meds for it currently. Having some pain in upper back at end of day for past couple wks. No preceding injury or change in activity. No radiating pain into either arm. Worries that it could be his lungs, but breathing has been ok and it doesn't hurt to take a deep breath.     Past Medical History:     Past Medical History:   Diagnosis Date    End-stage renal disease Conjunctiva/sclera: Conjunctivae normal.      Pupils: Pupils are equal, round, and reactive to light. Neck:      Thyroid: No thyroid mass or thyromegaly. Cardiovascular:      Rate and Rhythm: Normal rate and regular rhythm. Heart sounds: S1 normal and S2 normal. No murmur. Comments: No peripheral edema. Dialysis site present in L upper arm, thrill present, strong pulse. 2 small openings visible at distal end of site with no sign of infection. Anterior L forearm with larger old site, dilated, again thrill and strong pulse present. No swelling in the hand or wrist. Radial pulse 2+. Pulmonary:      Effort: Pulmonary effort is normal.      Breath sounds: Normal breath sounds. Abdominal:      General: Bowel sounds are normal.      Palpations: Abdomen is soft. Tenderness: There is no abdominal tenderness. Musculoskeletal:      Comments: Spinal curves WNL. Mild hypertonicity upper back musculature L>R. Lymphadenopathy:      Cervical: No cervical adenopathy. Skin:     General: Skin is warm and dry. Findings: No rash. Neurological:      Mental Status: He is alert and oriented to person, place, and time. Psychiatric:         Behavior: Behavior normal. Behavior is cooperative.          Data:     Lab Results   Component Value Date     02/09/2019    K 5.1 02/09/2019    CL 93 02/09/2019    CO2 30 02/09/2019    BUN 37 02/09/2019    CREATININE 9.68 02/09/2019    GLUCOSE 96 02/09/2019    PROT 9.1 02/09/2019    LABALBU 4.6 02/09/2019    BILITOT 0.59 02/09/2019    ALKPHOS 75 02/09/2019    AST 11 02/09/2019    ALT 17 02/09/2019     Lab Results   Component Value Date    WBC 6.2 02/09/2019    RBC 4.78 02/09/2019    HGB 14.7 02/09/2019    HCT 45.5 02/09/2019    MCV 95.1 02/09/2019    MCH 30.7 02/09/2019    MCHC 32.2 02/09/2019    RDW 15.4 02/09/2019     02/09/2019    MPV NOT REPORTED 02/09/2019     Lab Results   Component Value Date    TSH 1.18 02/09/2019     Lab Results   Component Value Date    CHOL 153 2019    HDL 39 2019    LABA1C 4.6 2016         Assessment & Plan:        Diagnosis Orders   1. End stage renal failure on dialysis ([facility] [address]     2. Polycystic kidney disease     3. Essential hypertension     4. Secondary hyperparathyroidism ([facility] [address]     5. Upper back pain     6. Gastroesophageal reflux disease without esophagitis     ESRD from PCKD, stable on hemodialysis M, W, F. L upper arm site functioning. Some dilation of previous site on L forearm which causes distal swelling and paresthesia. Advised he may use ice on that area to decrease swelling. Pt will see vascular Dr oLna Warner again to address. HTN overall controlled, cont current rx  Hyperparathyroid reportedly controlled, cont current rx  Upper back pain with some tight muscles - heat prn, exercises as below. Reassured of normal resp exam. No pleuritic component to pain. GERD bothersome - restart pepcid, renal dosing of 10 mg daily. Requested Prescriptions     Signed Prescriptions Disp Refills    famotidine (PEPCID) 10 MG tablet 30 tablet 5     Si po daily. Administer after dialysis on dialysis days (M, W, F)       Patient Instructions     SURVEY:    You may be receiving a survey from [employer] regarding your visit today. You may get this in the mail, through your MyChart or in your email. Please complete the survey to enable us to provide the highest quality of care to you and your family. If you cannot score us as very good ( 5 Stars) on any question, please feel free to call the office to discuss how we could have made your experience exceptional.     Thank you.     Clinical Care Team:  Dr. Concepcion Barajas,                                            [facility], [address] [place] [address] Team:  [facility]    Patient Education        Parte superior de la espalda saludable: Ejercicios  Healthy Upper Back: Exercises  Instrucciones de cuidado  Estos son algunos ejemplos de ejercicios para la parte superior de purdy espalda. Comience cada ejercicio lentamente. Reduzca la intensidad del ejercicio si Everlene Khushbu a sentir dolor. Purdy médico o el fisioterapeuta le dirán cuándo puede comenzar con estos ejercicios y cuáles funcionarán mejor para usted. Cómo se hacen los ejercicios  Estiramiento de la parte baja del srini y de la parte superior de la espalda   1. Extienda sylvia brazos hacia adelante. Ponga josseline mano sobre la otra y [address] . 2. Estírese suavemente, de manera que sienta que sylvia omóplatos se separan emma del [place]. 3. Agache suavemente purdy jayson hacia adelante. 4. Mantenga la posición entre 15 y 27 segundos. 5. Repita de 2 a 4 veces. Estiramiento de la parte media de la espalda   1. Arrodíllese en el piso y [employer] tobillos. 2. Inclínese hacia adelante, ponga sylvia bigg sobre el piso y estire sylvia brazos hacia el frente. Descanse purdy jayson entre sylvia brazos. 3. Empuje suavemente con purdy pecho [employer], llegando lo más lejos hacia el frente que pueda. 4. Mantenga la posición entre 15 y 27 segundos. 5. Repita de 2 a 4 veces. Rotación de hombros   1. Siéntese cómodamente y separe los pies a la distancia de los hombros. También puede hacer jyoti ejercicio estando de pie. 2. [place] los hombros [place], [place] atrás y posteriormente hacia abajo en un movimiento suave y circular. 3. Repita de 2 a 4 veces. Lagart[place] de pared   1. Párese frente a josseline pared, con los pies de 12 a 24 pulgadas (entre 30 y 61 centímetros) de la pared. Si siente algún dolor al hacer jyoti ejercicio, párese más cerca de la pared. 2. Ponga sylvia bigg sobre la pared ligeramente más separadas que sylvia hombros e inclínese hacia adelante. 3. Incline purdy cuerpo suavemente hacia la pared. Luego empuje para regresar a la posición [address]. 4. Repita de 8 a 12 veces.     Juntar los omóplatos con [employer] 1. Siéntese o póngase de pie, manteniendo la davis en ambas bigg al frente de usted. Mantenga sylvia codos cerca de los costados, doblados en un ángulo de 90 grados. Sylvia savanna deben estar [place]. 2. Comprima los omóplatos y [employer] brazos hacia afuera, estirando la davis. Asegúrese de que sylvia codos estén a sylvia costados mientras lo hace. 3. Relájese. 4. Repita de 8 a 12 veces. Remar con resistencia   1. Coloque la davis alrededor de un objeto sólido, brittani la pata de [place], aproximadamente a la altura de la cintura. Sostenga un extremo de la davis en cada mano. 2. Con los codos a los lados y flexionados a 80 grados, jale la davis hacia atrás para  sylvia omóplatos hacia el centro de la espalda. Regrese a la posición inicial.  3. Repita de 8 a 12 veces. La atención de seguimiento es josseline parte clave de purdy tratamiento y seguridad. Asegúrese de hacer y acudir a todas las citas, y llame a purdy médico si está teniendo problemas. También es josseline buena idea saber los resultados de sylvia exámenes y mantener josseline lista de los medicamentos que fiona. ¿Dónde puede encontrar más información en inglés? Aurora Coronel a [email or web address] e ingrese a purdy cuenta de [employer] en el [place] \"Search Health Information\" para más información (en inglés) sobre \"Parte superior de la espalda saludable: Ejercicios. \"     Si no tiene josseline cuenta, karen johnathan en el enlace \"Sign Up Now\". Revisado: 2 marzo, 2020               Versión del contenido: 12.5  © 2319-5524 Healthwise, Incorporated. Las instrucciones de cuidado fueron adaptadas bajo licencia por [facility] ([facility]). Si usted tiene [place] afección médica o sobre estas instrucciones, siempre pregunte a purdy profesional de christiano. Healthwise, Incorporated niega toda garantía o responsabilidad por purdy uso de esta información.              Naz Choi received counseling on the following healthy behaviors: medication adherence  Reviewed prior labs and health maintenance. Continue current medications, diet and exercise. Discussed use, benefit, and side effects of prescribed medications. Barriers to medication compliance addressed. Patient given educational materials - see patient instructions. All patient questions answered. Patient voiced understanding.      Electronically signed by Janet Uriostegui DO on 9/17/2020 at 4:17 PM   [address]  Dept: [phone]

## 2020-09-15 NOTE — PATIENT INSTRUCTIONS
SURVEY:    You may be receiving a survey from [employer] regarding your visit today. You may get this in the mail, through your MyChart or in your email. Please complete the survey to enable us to provide the highest quality of care to you and your family. If you cannot score us as very good ( 5 Stars) on any question, please feel free to call the office to discuss how we could have made your experience exceptional.     Thank you. Clinical Care Team:  DO Clair Marcos, [facility] Team:  Nasim Velaeste    Patient Education        Parte superior de la espalda saludable: Ejercicios  Healthy Upper Back: Exercises  Instrucciones de cuidado  Estos son algunos ejemplos de ejercicios para la parte superior de purdy espalda. Comience cada ejercicio lentamente. Reduzca la intensidad del ejercicio si Thena Shires a sentir dolor. Purdy médico o el fisioterapeuta le dirán cuándo puede comenzar con estos ejercicios y cuáles funcionarán mejor para usted. Cómo se hacen los ejercicios  Estiramiento de la parte baja del srini y de la parte superior de la espalda   1. Extienda sylvia brazos hacia adelante. Ponga josseline mano sobre la otra y [address]  2. Estírese suavemente, de manera que sienta que sylvia omóplatos se separan emma del [place]. 3. Agache suavemente purdy jayson hacia adelante. 4. Mantenga la posición entre 15 y 27 segundos. 5. Repita de 2 a 4 veces. Estiramiento de la parte media de la espalda   1. Arrodíllese en el piso y [employer] tobillos. 2. Inclínese hacia adelante, ponga sylvia bigg sobre el piso y estire sylvia brazos hacia el frente. Descanse purdy jayson entre sylvia brazos. 3. Empuje suavemente con purdy pecho [employer], llegando lo más lejos hacia el frente que pueda. 4. Mantenga la posición entre 15 y 27 segundos. 5. Repita de 2 a 4 veces.     Rotación de hombros 1. Siéntese cómodamente y separe los pies a la distancia de los hombros. También puede hacer jyoti ejercicio estando de pie. 2. [place] los hombros [place], [place] atrás y posteriormente hacia abajo en un movimiento suave y circular. 3. Repita de 2 a 4 veces. Lagartija de pared   1. Párese frente a josseline pared, con los pies de 12 a 24 pulgadas (entre 30 y 61 centímetros) de la pared. Si siente algún dolor al hacer jyoti ejercicio, párese más cerca de la pared. 2. Ponga [place] bigg sobre la pared ligeramente más separadas que [place] hombros e inclínese hacia adelante. 3. Incline purdy cuerpo suavemente hacia la pared. Luego empuje para regresar a la posición [address] y [address]. 4. Repita de 8 a 12 veces. Juntar los omóplatos con resistencia   1. Siéntese o póngase de pie, manteniendo la davis en ambas bigg al frente de usted. Mantenga [place] codos cerca de los costados, doblados en un ángulo de 90 grados. [place] savanna deben estar [place]. 2. Comprima los omóplatos y [employer] brazos hacia afuera, estirando la davis. Asegúrese de que [place] codos estén a [place] costados mientras lo hace. 3. Relájese. 4. Repita de 8 a 12 veces. Remar con resistencia   1. Coloque la davis alrededor de un objeto sólido, brittani la pata de [place], aproximadamente a la altura de la cintura. Sostenga un extremo de la davis en cada mano. 2. Con los codos a los lados y flexionados a 80 grados, jale la davis hacia atrás para  [place] omóplatos hacia el centro de la espalda. Regrese a la posición inicial.  3. Repita de 8 a 12 veces. La atención de seguimiento es josseline parte clave de purdy tratamiento y seguridad. Asegúrese de hacer y acudir a todas las citas, y llame a purdy médico si está teniendo problemas. También es josseline buena idea saber los resultados de [place] exámenes y mantener josseline lista de los medicamentos que fiona. ¿Dónde puede encontrar más información en inglés?   Kelli Duhamel a

## 2020-09-17 ASSESSMENT — ENCOUNTER SYMPTOMS: RESPIRATORY NEGATIVE: 1

## 2020-11-03 PROBLEM — I10 ESSENTIAL HYPERTENSION: Status: RESOLVED | Noted: 2017-07-18 | Resolved: 2020-11-03

## 2020-12-26 ENCOUNTER — HOSPITAL ENCOUNTER (EMERGENCY)
Age: 45
Discharge: ANOTHER HOSPITAL | End: 2020-12-26
Attending: FAMILY MEDICINE
Payer: MEDICARE

## 2020-12-26 ENCOUNTER — APPOINTMENT (OUTPATIENT)
Dept: GENERAL RADIOLOGY | Age: 45
End: 2020-12-26
Payer: MEDICARE

## 2020-12-26 VITALS
HEART RATE: 67 BPM | OXYGEN SATURATION: 99 % | DIASTOLIC BLOOD PRESSURE: 80 MMHG | RESPIRATION RATE: 22 BRPM | TEMPERATURE: 100.3 F | BODY MASS INDEX: 30.21 KG/M2 | SYSTOLIC BLOOD PRESSURE: 163 MMHG | WEIGHT: 176 LBS

## 2020-12-26 LAB
ABSOLUTE EOS #: 0 K/UL (ref 0–0.4)
ABSOLUTE IMMATURE GRANULOCYTE: ABNORMAL K/UL (ref 0–0.3)
ABSOLUTE LYMPH #: 0.6 K/UL (ref 1–4.8)
ABSOLUTE MONO #: 0.6 K/UL (ref 0–1)
ALBUMIN SERPL-MCNC: 3.8 G/DL (ref 3.5–5.2)
ALBUMIN/GLOBULIN RATIO: ABNORMAL (ref 1–2.5)
ALP BLD-CCNC: 54 U/L (ref 40–129)
ALT SERPL-CCNC: 13 U/L (ref 5–41)
ANION GAP SERPL CALCULATED.3IONS-SCNC: 18 MMOL/L (ref 9–17)
AST SERPL-CCNC: 13 U/L
BASOPHILS # BLD: 0 % (ref 0–2)
BASOPHILS ABSOLUTE: 0 K/UL (ref 0–0.2)
BILIRUB SERPL-MCNC: 0.26 MG/DL (ref 0.3–1.2)
BUN BLDV-MCNC: 56 MG/DL (ref 6–20)
BUN/CREAT BLD: ABNORMAL (ref 9–20)
CALCIUM SERPL-MCNC: 8.4 MG/DL (ref 8.6–10.4)
CHLORIDE BLD-SCNC: 90 MMOL/L (ref 98–107)
CO2: 28 MMOL/L (ref 20–31)
CREAT SERPL-MCNC: 14.92 MG/DL (ref 0.7–1.2)
DIFFERENTIAL TYPE: YES
EKG ATRIAL RATE: 66 BPM
EKG P AXIS: -9 DEGREES
EKG P-R INTERVAL: 152 MS
EKG Q-T INTERVAL: 416 MS
EKG QRS DURATION: 84 MS
EKG QTC CALCULATION (BAZETT): 436 MS
EKG R AXIS: -6 DEGREES
EKG T AXIS: 36 DEGREES
EKG VENTRICULAR RATE: 66 BPM
EOSINOPHILS RELATIVE PERCENT: 1 % (ref 0–5)
GFR AFRICAN AMERICAN: 4 ML/MIN
GFR NON-AFRICAN AMERICAN: 4 ML/MIN
GFR SERPL CREATININE-BSD FRML MDRD: ABNORMAL ML/MIN/{1.73_M2}
GFR SERPL CREATININE-BSD FRML MDRD: ABNORMAL ML/MIN/{1.73_M2}
GLUCOSE BLD-MCNC: 151 MG/DL (ref 65–99)
GLUCOSE BLD-MCNC: 99 MG/DL (ref 70–99)
HCT VFR BLD CALC: 31.8 % (ref 41–53)
HEMOGLOBIN: 10.8 G/DL (ref 13.5–17.5)
IMMATURE GRANULOCYTES: ABNORMAL %
LACTIC ACID, WHOLE BLOOD: NORMAL MMOL/L (ref 0.7–2.1)
LACTIC ACID: 1 MMOL/L (ref 0.5–2.2)
LIPASE: 76 U/L (ref 13–60)
LYMPHOCYTES # BLD: 11 % (ref 13–44)
MCH RBC QN AUTO: 31.9 PG (ref 26–34)
MCHC RBC AUTO-ENTMCNC: 34 G/DL (ref 31–37)
MCV RBC AUTO: 94 FL (ref 80–100)
MONOCYTES # BLD: 10 % (ref 5–9)
NRBC AUTOMATED: ABNORMAL PER 100 WBC
PDW BLD-RTO: 14.4 % (ref 12.1–15.2)
PLATELET # BLD: 148 K/UL (ref 140–450)
PLATELET ESTIMATE: ABNORMAL
PMV BLD AUTO: ABNORMAL FL (ref 6–12)
POTASSIUM SERPL-SCNC: 6.9 MMOL/L (ref 3.7–5.3)
RBC # BLD: 3.39 M/UL (ref 4.5–5.9)
RBC # BLD: ABNORMAL 10*6/UL
SEG NEUTROPHILS: 78 % (ref 39–75)
SEGMENTED NEUTROPHILS ABSOLUTE COUNT: 4.3 K/UL (ref 2.1–6.5)
SODIUM BLD-SCNC: 136 MMOL/L (ref 135–144)
TOTAL PROTEIN: 7 G/DL (ref 6.4–8.3)
TROPONIN INTERP: NORMAL
TROPONIN T: <0.03 NG/ML
TROPONIN, HIGH SENSITIVITY: NORMAL NG/L (ref 0–22)
WBC # BLD: 5.5 K/UL (ref 3.5–11)
WBC # BLD: ABNORMAL 10*3/UL

## 2020-12-26 PROCEDURE — 2580000003 HC RX 258: Performed by: FAMILY MEDICINE

## 2020-12-26 PROCEDURE — 96365 THER/PROPH/DIAG IV INF INIT: CPT

## 2020-12-26 PROCEDURE — 96366 THER/PROPH/DIAG IV INF ADDON: CPT

## 2020-12-26 PROCEDURE — 84484 ASSAY OF TROPONIN QUANT: CPT

## 2020-12-26 PROCEDURE — 99285 EMERGENCY DEPT VISIT HI MDM: CPT

## 2020-12-26 PROCEDURE — 82947 ASSAY GLUCOSE BLOOD QUANT: CPT

## 2020-12-26 PROCEDURE — 6360000002 HC RX W HCPCS: Performed by: FAMILY MEDICINE

## 2020-12-26 PROCEDURE — 83605 ASSAY OF LACTIC ACID: CPT

## 2020-12-26 PROCEDURE — 96375 TX/PRO/DX INJ NEW DRUG ADDON: CPT

## 2020-12-26 PROCEDURE — 85025 COMPLETE CBC W/AUTO DIFF WBC: CPT

## 2020-12-26 PROCEDURE — 93005 ELECTROCARDIOGRAM TRACING: CPT | Performed by: FAMILY MEDICINE

## 2020-12-26 PROCEDURE — 96367 TX/PROPH/DG ADDL SEQ IV INF: CPT

## 2020-12-26 PROCEDURE — 94664 DEMO&/EVAL PT USE INHALER: CPT

## 2020-12-26 PROCEDURE — 6370000000 HC RX 637 (ALT 250 FOR IP): Performed by: FAMILY MEDICINE

## 2020-12-26 PROCEDURE — 83690 ASSAY OF LIPASE: CPT

## 2020-12-26 PROCEDURE — 80053 COMPREHEN METABOLIC PANEL: CPT

## 2020-12-26 PROCEDURE — 93010 ELECTROCARDIOGRAM REPORT: CPT | Performed by: INTERNAL MEDICINE

## 2020-12-26 PROCEDURE — 71045 X-RAY EXAM CHEST 1 VIEW: CPT

## 2020-12-26 PROCEDURE — 36415 COLL VENOUS BLD VENIPUNCTURE: CPT

## 2020-12-26 PROCEDURE — 87040 BLOOD CULTURE FOR BACTERIA: CPT

## 2020-12-26 RX ORDER — LEVOFLOXACIN 5 MG/ML
250 INJECTION, SOLUTION INTRAVENOUS EVERY 24 HOURS
Status: DISCONTINUED | OUTPATIENT
Start: 2020-12-26 | End: 2020-12-27 | Stop reason: HOSPADM

## 2020-12-26 RX ORDER — ALBUTEROL SULFATE 2.5 MG/3ML
2.5 SOLUTION RESPIRATORY (INHALATION) ONCE
Status: COMPLETED | OUTPATIENT
Start: 2020-12-26 | End: 2020-12-26

## 2020-12-26 RX ORDER — SODIUM CHLORIDE 9 MG/ML
INJECTION, SOLUTION INTRAVENOUS CONTINUOUS
Status: DISCONTINUED | OUTPATIENT
Start: 2020-12-26 | End: 2020-12-27 | Stop reason: HOSPADM

## 2020-12-26 RX ORDER — CALCIUM GLUCONATE 94 MG/ML
INJECTION, SOLUTION INTRAVENOUS
Status: DISCONTINUED
Start: 2020-12-26 | End: 2020-12-27 | Stop reason: HOSPADM

## 2020-12-26 RX ORDER — HYDRALAZINE HYDROCHLORIDE 25 MG/1
25 TABLET, FILM COATED ORAL ONCE
Status: COMPLETED | OUTPATIENT
Start: 2020-12-26 | End: 2020-12-26

## 2020-12-26 RX ORDER — FUROSEMIDE 10 MG/ML
40 INJECTION INTRAMUSCULAR; INTRAVENOUS ONCE
Status: COMPLETED | OUTPATIENT
Start: 2020-12-26 | End: 2020-12-26

## 2020-12-26 RX ORDER — DEXTROSE MONOHYDRATE 25 G/50ML
25 INJECTION, SOLUTION INTRAVENOUS ONCE
Status: COMPLETED | OUTPATIENT
Start: 2020-12-26 | End: 2020-12-26

## 2020-12-26 RX ORDER — DEXAMETHASONE SODIUM PHOSPHATE 10 MG/ML
10 INJECTION, SOLUTION INTRAMUSCULAR; INTRAVENOUS ONCE
Status: COMPLETED | OUTPATIENT
Start: 2020-12-26 | End: 2020-12-26

## 2020-12-26 RX ORDER — ACETAMINOPHEN 325 MG/1
650 TABLET ORAL EVERY 6 HOURS PRN
COMMUNITY
End: 2021-08-24 | Stop reason: ALTCHOICE

## 2020-12-26 RX ADMIN — HYDRALAZINE HYDROCHLORIDE 25 MG: 25 TABLET, FILM COATED ORAL at 17:09

## 2020-12-26 RX ADMIN — SODIUM CHLORIDE: 9 INJECTION, SOLUTION INTRAVENOUS at 18:43

## 2020-12-26 RX ADMIN — DEXAMETHASONE SODIUM PHOSPHATE 10 MG: 10 INJECTION, SOLUTION INTRAMUSCULAR; INTRAVENOUS at 18:40

## 2020-12-26 RX ADMIN — FUROSEMIDE 40 MG: 10 INJECTION, SOLUTION INTRAMUSCULAR; INTRAVENOUS at 18:40

## 2020-12-26 RX ADMIN — DEXTROSE MONOHYDRATE 25 G: 25 INJECTION, SOLUTION INTRAVENOUS at 19:29

## 2020-12-26 RX ADMIN — LEVOFLOXACIN 250 MG: 5 INJECTION, SOLUTION INTRAVENOUS at 17:50

## 2020-12-26 RX ADMIN — INSULIN HUMAN 10 UNITS: 100 INJECTION, SOLUTION PARENTERAL at 19:29

## 2020-12-26 RX ADMIN — ALBUTEROL SULFATE 2.5 MG: 2.5 SOLUTION RESPIRATORY (INHALATION) at 18:01

## 2020-12-26 RX ADMIN — CALCIUM GLUCONATE 1 G: 94 INJECTION, SOLUTION INTRAVENOUS at 19:30

## 2020-12-26 NOTE — ED PROVIDER NOTES
eMERGENCY dEPARTMENT eNCOUnter        [facility]    Chief Complaint   Patient presents with    Shortness of Breath     more SOB for last few days. covid positive 3 days ago       HPI    Teetee Cooper is a 41 y.o. male who presents with shortness of breath for the last 2 to 3 days. He did test positive for Covid 3 days ago. He has had some chills. He took Tylenol for the discomfort yesterday. He feels shortness of breath but not does not have recurrent cough. End-stage renal disease and had last dialysis this past Thursday(2 days ago). No abdominal pain, vomiting or diarrhea. Taking his regular medicines.  is used  REVIEW OF SYSTEMS    All body systems reviewed with pertinent positive and negative findings mentioned in HPI. Aramis Atkinson PAST MEDICAL HISTORY    Past Medical History:   Diagnosis Date    End-stage renal disease on hemodialysis ([facility] [address]     since 2006, due to polycystic kidney disease    Hypertension     Hypertriglyceridemia        SURGICAL HISTORY    Past Surgical History:   Procedure Laterality Date    DIALYSIS FISTULA CREATION      THYROID LO[facility]CTOMY  2012    [facility]       CURRENT MEDICATIONS    Current Outpatient Rx   Medication Sig Dispense Refill    acetaminophen (TYLENOL) 325 MG tablet Take 650 mg by mouth every 6 hours as needed for Pain      famotidine (PEPCID) 10 MG tablet 1 po daily. Administer after dialysis on dialysis days (M, W, F) 30 tablet 5    metoprolol succinate (TOPROL XL) 100 MG extended release tablet TAKE 1 TABLET BY MOUTH EVERY DAY 30 tablet 5    amLODIPine (NORVASC) 10 MG tablet TAKE 1 TABLET BY MOUTH EVERY DAY 30 tablet 5    hydrALAZINE (APRESOLINE) 50 MG tablet Take 1 tablet by mouth 2 times daily 60 tablet 5    sevelamer (RENVELA) 800 MG tablet Take 1 tablet by mouth 3 times daily (with meals)         ALLERGIES    No Known Allergies    FAMILY HISTORY    History reviewed. No pertinent family history.     SOCIAL HISTORY    Social History Socioeconomic History    Marital status:      Spouse name: None    Number of children: None    Years of education: None    Highest education level: None   Occupational History    None   Social Needs    Financial resource strain: None    Food insecurity     Worry: None     Inability: None    Transportation needs     Medical: None     Non-medical: None   Tobacco Use    Smoking status: Never Smoker    Smokeless tobacco: Never Used   Substance and Sexual Activity    Alcohol use: No     Alcohol/week: 0.0 standard drinks    Drug use: No    Sexual activity: None   Lifestyle    Physical activity     Days per week: None     Minutes per session: None    Stress: None   Relationships    Social connections     Talks on phone: None     Gets together: None     Attends Church service: None     Active member of club or organization: None     Attends meetings of clubs or organizations: None     Relationship status: None    Intimate partner violence     Fear of current or ex partner: None     Emotionally abused: None     Physically abused: None     Forced sexual activity: None   Other Topics Concern    None   Social History Narrative    None       PHYSICAL EXAM    VITAL SIGNS: BP (!) 182/94   Pulse 71   Temp 100.3 °F (37.9 °C) (Oral)   Resp 18   Wt 176 lb (79.8 kg)   SpO2 96%   BMI 30.21 kg/m²    Constitutional:  Well developed, well nourished, 71-year-old  male complaining of SOB and no acute distress   HENT:  Atraumatic, external ears normal, nose normal, oropharynx moist. Neck- supple   Respiratory: The lungs appear clear with no respiratory difficulty. The patient can take in deep breaths. Some scattered crackles right lower lung. No chest wall tenderness. Cardiovascular: Heart has regular rate and rhythm at 72/min. No distinct murmur or gallop rhythm heard.   GI:  Soft, nondistended, normal bowel sounds, nontender, no hepatomegaly, no guarding   Musculoskeletal:  No extremity 12/26/20 1801)   furosemide (LASIX) injection 40 mg (40 mg Intravenous Given 12/26/20 1840)   dexamethasone (PF) (DECADRON) injection 10 mg (10 mg Intravenous Given 12/26/20 1840)   insulin regular (HUMULIN R;NOVOLIN R) injection 10 Units (10 Units Intravenous Given 12/26/20 1929)   dextrose 50 % IV solution (25 g Intravenous Given 12/26/20 1929)       New Prescriptions from this visit:    New Prescriptions    No medications on file       Follow-up: Transfer to [facility].     Final Impression:   1. Pneumonia due to COVID-19 virus    2. End stage renal disease ([facility] [address]    3.  Hyperkalemia               (Please note that portions of this note were completed with a voice recognition program.  Efforts were made to edit the dictations but occasionally words are mis-transcribed.)          Belen Campuzano, DO  12/27/20 1897 [facility], DO  12/27/20 8307

## 2020-12-26 NOTE — Clinical Note
Transfer to [facility] for Covid pneumonia and end-stage renal failure. He has hyperkalemia at 6.9. This case is discussed with hospitalist at [place] where his excepted for care. Need for dialysis is discussed. Patient is treated wi th albuterol for respiratory and potassium concerns.

## 2020-12-27 NOTE — ED NOTES
Report given to Lilo Scott RN at [facility].       Gabi Lechuga RN  12/26/20 8968

## 2020-12-27 NOTE — ED NOTES
Jose Wallace just notified that they will be an hour late for pickup.      Henry Hoffman RN  12/26/20 2114

## 2020-12-27 NOTE — ED NOTES
Carter Loo RN at [facility] informed that transport is here.       Ousmane Palma RN  12/26/20 9010

## 2020-12-27 NOTE — ED NOTES
Report given to Marek [facility] Paramedic with [facility].      Darrick Carrillo RN  12/26/20 2751

## 2021-01-01 LAB
CULTURE: NORMAL
Lab: NORMAL
SPECIMEN DESCRIPTION: NORMAL

## 2021-01-14 ENCOUNTER — HOSPITAL ENCOUNTER (OUTPATIENT)
Dept: GENERAL RADIOLOGY | Age: 46
Discharge: HOME | End: 2021-01-16
Payer: COMMERCIAL

## 2021-01-14 ENCOUNTER — HOSPITAL ENCOUNTER (OUTPATIENT)
Age: 46
Discharge: HOME | End: 2021-01-16
Payer: COMMERCIAL

## 2021-01-14 ENCOUNTER — OFFICE VISIT (OUTPATIENT)
Dept: FAMILY MEDICINE CLINIC | Age: 46
End: 2021-01-14
Payer: COMMERCIAL

## 2021-01-14 VITALS
OXYGEN SATURATION: 99 % | DIASTOLIC BLOOD PRESSURE: 86 MMHG | BODY MASS INDEX: 27.66 KG/M2 | SYSTOLIC BLOOD PRESSURE: 162 MMHG | WEIGHT: 162 LBS | HEART RATE: 66 BPM | HEIGHT: 64 IN

## 2021-01-14 DIAGNOSIS — M54.9 UPPER BACK PAIN: Primary | ICD-10-CM

## 2021-01-14 DIAGNOSIS — Z87.01 HISTORY OF VIRAL PNEUMONIA: ICD-10-CM

## 2021-01-14 DIAGNOSIS — Z86.16 HISTORY OF 2019 NOVEL CORONAVIRUS DISEASE (COVID-19): ICD-10-CM

## 2021-01-14 DIAGNOSIS — M54.9 UPPER BACK PAIN: ICD-10-CM

## 2021-01-14 PROCEDURE — G8419 CALC BMI OUT NRM PARAM NOF/U: HCPCS | Performed by: FAMILY MEDICINE

## 2021-01-14 PROCEDURE — G8484 FLU IMMUNIZE NO ADMIN: HCPCS | Performed by: FAMILY MEDICINE

## 2021-01-14 PROCEDURE — 71046 X-RAY EXAM CHEST 2 VIEWS: CPT

## 2021-01-14 PROCEDURE — 99213 OFFICE O/P EST LOW 20 MIN: CPT | Performed by: FAMILY MEDICINE

## 2021-01-14 PROCEDURE — G8427 DOCREV CUR MEDS BY ELIG CLIN: HCPCS | Performed by: FAMILY MEDICINE

## 2021-01-14 PROCEDURE — 1036F TOBACCO NON-USER: CPT | Performed by: FAMILY MEDICINE

## 2021-01-14 RX ORDER — LIDOCAINE AND PRILOCAINE 25; 25 MG/G; MG/G
CREAM TOPICAL
COMMUNITY
Start: 2020-11-30

## 2021-01-14 RX ORDER — DEXAMETHASONE 6 MG/1
6 TABLET ORAL
Qty: 5 TABLET | Refills: 0 | Status: SHIPPED | OUTPATIENT
Start: 2021-01-14 | End: 2021-01-19

## 2021-01-14 RX ORDER — ERGOCALCIFEROL (VITAMIN D2) 1250 MCG
CAPSULE ORAL
COMMUNITY
Start: 2020-11-30

## 2021-01-14 SDOH — ECONOMIC STABILITY: TRANSPORTATION INSECURITY
IN THE PAST 12 MONTHS, HAS THE LACK OF TRANSPORTATION KEPT YOU FROM MEDICAL APPOINTMENTS OR FROM GETTING MEDICATIONS?: NOT ASKED

## 2021-01-14 SDOH — ECONOMIC STABILITY: INCOME INSECURITY: HOW HARD IS IT FOR YOU TO PAY FOR THE VERY BASICS LIKE FOOD, HOUSING, MEDICAL CARE, AND HEATING?: NOT HARD AT ALL

## 2021-01-14 SDOH — ECONOMIC STABILITY: FOOD INSECURITY: WITHIN THE PAST 12 MONTHS, YOU WORRIED THAT YOUR FOOD WOULD RUN OUT BEFORE YOU GOT MONEY TO BUY MORE.: NEVER TRUE

## 2021-01-14 ASSESSMENT — ENCOUNTER SYMPTOMS
GASTROINTESTINAL NEGATIVE: 1
RESPIRATORY NEGATIVE: 1

## 2021-01-14 ASSESSMENT — PATIENT HEALTH QUESTIONNAIRE - PHQ9
1. LITTLE INTEREST OR PLEASURE IN DOING THINGS: 0
2. FEELING DOWN, DEPRESSED OR HOPELESS: 0
SUM OF ALL RESPONSES TO PHQ9 QUESTIONS 1 & 2: 0
SUM OF ALL RESPONSES TO PHQ QUESTIONS 1-9: 0

## 2021-01-14 NOTE — PATIENT INSTRUCTIONS
SURVEY:    You may be receiving a survey from [employer] regarding your visit today. You may get this in the mail, through your MyChart or in your email. Please complete the survey to enable us to provide the highest quality of care to you and your family. If you cannot score us as very good ( 5 Stars) on any question, please feel free to call the office to discuss how we could have made your experience exceptional.     Thank you.     Clinical Care Team:  Dr. Liz Malin, DO Danielle Cortez, [address] [place] [address] Team:  [address] [place] [address]

## 2021-01-14 NOTE — PROGRESS NOTES
Name: Klarissa Yao  : 1975         Chief Complaint:     Chief Complaint   Patient presents with    Back Pain     #[id number]  Back pain from shoulders to waist. no numbness or tingling. started in December when he got Covid. covid FMLA  12/15- current       History of Present Illness:      Klarissa Yao is a 41 y.o.  male who presents with Back Pain (#[id number]  Back pain from shoulders to waist. no numbness or tingling. started in December when he got Covid. covid FMLA  12/15- current)      HPI     Patient seen in the office and interviewed with the help of telephone  #[id number], c/o diffuse back pain since COVID infection, for which he had been admitted in [facility] -. States his upper to mid back has been in constant pain, difficulty getting comfortable overnight. Ends up lying on side and hasn't slept well ever since he got COVID, including while in the hospital. Has been off work since 12/15. Ever since he got sick, doesn't feel well after HD sessions - gets a lot of back ache and feels cold. Temperature readings at dialysis are normal per his report. Finishes HD at 1 and then feels that way til 10-11PM. Coughing less than he had been but still coughs every hour or so, productive of yellow phlegm. He has not been taking any meds for cough or pain. Does have tylenol at home but has avoided using it as he thought it may have caused the difficulty sleeping in the hospital.     Past Medical History:     Past Medical History:   Diagnosis Date    End-stage renal disease on hemodialysis ([facility] [address]     since , due to polycystic kidney disease    Hypertension     Hypertriglyceridemia         Past Surgical History:     Past Surgical History:   Procedure Laterality Date    DIALYSIS FISTULA CREATION      [facility]RO[facility] LOBECTOMY      [facility]        Medications:       Prior to Admission medications    Medication Sig Start Date End Date Taking?  Authorizing Provider   lidocaine-prilocaine (EMLA) 2.5-2.5 % cream APPLY SMALL AMOUNT TO ACCESS SITE (AVF) 1 HOUR BEFORE DIALYSIS. COVER WITH OCCLUSIVE DRESSING (SARAN WRAP) 11/30/20  Yes Historical Provider, MD   ergocalciferol (ERGOCALCIFEROL) 1.25 MG (72750 UT) capsule TAKE 1 CAPSULE BY MOUTH EVERY 2 WEEKS 11/30/20  Yes Historical Provider, MD   acetaminophen (TYLENOL) 325 MG tablet Take 650 mg by mouth every 6 hours as needed for Pain    Historical Provider, MD   famotidine (PEPCID) 10 MG tablet 1 po daily. Administer after dialysis on dialysis days (M, W, F) 9/15/20   Frutoso Sexton, DO   metoprolol succinate (TOPROL XL) 100 MG extended release tablet TAKE 1 TABLET BY MOUTH EVERY DAY 8/18/20   Frutoso Sexton, DO   amLODIPine (NORVASC) 10 MG tablet TAKE 1 TABLET BY MOUTH EVERY DAY 8/18/20   Frutoso Sexton, DO   hydrALAZINE (APRESOLINE) 50 MG tablet Take 1 tablet by mouth 2 times daily 11/19/19   Frutoso Sexton, DO   sevelamer (RENVELA) 800 MG tablet Take 1 tablet by mouth 3 times daily (with meals)    Historical Provider, MD        Allergies:       Patient has no known allergies. Social History:     Tobacco:    reports that he has never smoked. He has never used smokeless tobacco.  Alcohol:      reports no history of alcohol use. Drug Use:  reports no history of drug use. Family History:     No family history on file. Review of Systems:     Positive and Negative as described in HPI    Review of Systems   HENT: Negative. Respiratory: Negative. Gastrointestinal: Negative. Physical Exam:     Vitals:  BP (!) 162/86   Pulse 66   Ht 5' 4\" (1.626 m)   Wt 162 lb (73.5 kg)   SpO2 99%   BMI 27.81 kg/m²   Physical Exam  Vitals signs and nursing note reviewed. Constitutional:       General: He is not in acute distress. Appearance: Normal appearance. He is well-developed. He is not ill-appearing.    HENT:      Right Ear: Tympanic membrane and ear canal normal.      Left Ear: Tympanic membrane and ear canal normal.      Nose: back pain which may be related to change in activity with him being off work for nearly a month, staying in bed in the hospital, but could also be related to ongoing pneumonia with or without empyema. He appeared well on exam, has normal vital signs aside from elevated blood pressure which is usual for him, only had very faint rales in the left lower lobe. However, with end-stage renal disease he is at high risk of complications from Covid. Updating chest x-ray. Patient did mention later in the appointment that he has been doing some painting at home, so I suspect he is actually feeling quite a bit better. Advised if his chest x-ray is normal he can return to work Monday, 1/18. Again if chest x-ray is normal, then he can continue to increase activity, use heat, stretching, Tylenol as needed. Requested Prescriptions      No prescriptions requested or ordered in this encounter       Patient Instructions   SURVEY:    You may be receiving a survey from [employer] regarding your visit today. You may get this in the mail, through your MyChart or in your email. Please complete the survey to enable us to provide the highest quality of care to you and your family. If you cannot score us as very good ( 5 Stars) on any question, please feel free to call the office to discuss how we could have made your experience exceptional.     Thank you. Clinical Care Team:  DO Oswald Miles, [address] [place] [address] Team:  Marielle Sher received counseling on the following healthy behaviors: medication adherence  Reviewed prior labs and health maintenance. Continue current medications, diet and exercise. Discussed use, benefit, and side effects of prescribed medications. Barriers to medication compliance addressed.    Patient given educational materials - see patient instructions. All patient questions answered. Patient voiced understanding.      Electronically signed by Franki Mendez DO on 1/14/2021 at 9:45 AM   [address]  Dept: [phone]

## 2021-01-15 ENCOUNTER — TELEPHONE (OUTPATIENT)
Dept: FAMILY MEDICINE CLINIC | Age: 46
End: 2021-01-15

## 2021-01-15 NOTE — TELEPHONE ENCOUNTER
----- Message from Dagmar Castro DO sent at 1/14/2021  1:06 PM EST -----  Patient's chest x-ray had improved, no new changes, but still had signs of congestion from Covid. I recommend that he try sleeping on his stomach. Would also like to start an oral steroid. He was on this in the hospital but according to records he was not sent home with a prescription for it. I do think that it could help him to feel better. I will Rx. Papers done for him to go back to work Monday.

## 2021-03-16 ENCOUNTER — OFFICE VISIT (OUTPATIENT)
Dept: FAMILY MEDICINE CLINIC | Age: 46
End: 2021-03-16
Payer: MEDICARE

## 2021-03-16 VITALS
HEART RATE: 59 BPM | BODY MASS INDEX: 27.14 KG/M2 | WEIGHT: 159 LBS | DIASTOLIC BLOOD PRESSURE: 60 MMHG | OXYGEN SATURATION: 99 % | SYSTOLIC BLOOD PRESSURE: 108 MMHG | HEIGHT: 64 IN

## 2021-03-16 DIAGNOSIS — N25.81 SECONDARY HYPERPARATHYROIDISM (HCC): ICD-10-CM

## 2021-03-16 DIAGNOSIS — N18.6 END STAGE RENAL FAILURE ON DIALYSIS (HCC): ICD-10-CM

## 2021-03-16 DIAGNOSIS — Z87.01 HISTORY OF VIRAL PNEUMONIA: ICD-10-CM

## 2021-03-16 DIAGNOSIS — M54.9 UPPER BACK PAIN: ICD-10-CM

## 2021-03-16 DIAGNOSIS — Z99.2 END STAGE RENAL FAILURE ON DIALYSIS (HCC): ICD-10-CM

## 2021-03-16 DIAGNOSIS — I10 ESSENTIAL HYPERTENSION: Primary | ICD-10-CM

## 2021-03-16 PROCEDURE — 1036F TOBACCO NON-USER: CPT | Performed by: FAMILY MEDICINE

## 2021-03-16 PROCEDURE — G8419 CALC BMI OUT NRM PARAM NOF/U: HCPCS | Performed by: FAMILY MEDICINE

## 2021-03-16 PROCEDURE — G8427 DOCREV CUR MEDS BY ELIG CLIN: HCPCS | Performed by: FAMILY MEDICINE

## 2021-03-16 PROCEDURE — G8484 FLU IMMUNIZE NO ADMIN: HCPCS | Performed by: FAMILY MEDICINE

## 2021-03-16 PROCEDURE — 99214 OFFICE O/P EST MOD 30 MIN: CPT | Performed by: FAMILY MEDICINE

## 2021-03-16 RX ORDER — SUCROFERRIC OXYHYDROXIDE 500 MG/1
2 TABLET, CHEWABLE ORAL
COMMUNITY
Start: 2021-02-12

## 2021-03-16 RX ORDER — METOPROLOL SUCCINATE 100 MG/1
TABLET, EXTENDED RELEASE ORAL
Qty: 90 TABLET | Refills: 3 | Status: SHIPPED | OUTPATIENT
Start: 2021-03-16 | End: 2022-07-07 | Stop reason: SDUPTHER

## 2021-03-16 RX ORDER — AMLODIPINE BESYLATE 10 MG/1
TABLET ORAL
Qty: 90 TABLET | Refills: 3 | Status: SHIPPED | OUTPATIENT
Start: 2021-03-16 | End: 2022-07-07

## 2021-03-16 ASSESSMENT — PATIENT HEALTH QUESTIONNAIRE - PHQ9
SUM OF ALL RESPONSES TO PHQ QUESTIONS 1-9: 0
SUM OF ALL RESPONSES TO PHQ9 QUESTIONS 1 & 2: 0

## 2021-03-16 NOTE — PROGRESS NOTES
TABLET BY MOUTH EVERY DAY 3/16/21  Yes Aneglo Villalta DO   amLODIPine (NORVASC) 10 MG tablet TAKE 1 TABLET BY MOUTH EVERY DAY 3/16/21  Yes Angelo Villalta DO   lidocaine-prilocaine (EMLA) 2.5-2.5 % cream APPLY SMALL AMOUNT TO ACCESS SITE (AVF) 1 HOUR BEFORE DIALYSIS. COVER WITH OCCLUSIVE DRESSING (SARAN WRAP) 11/30/20  Yes Historical Provider, MD   ergocalciferol (ERGOCALCIFEROL) 1.25 MG (18198 UT) capsule TAKE 1 CAPSULE BY MOUTH EVERY 2 WEEKS 11/30/20  Yes Historical Provider, MD   acetaminophen (TYLENOL) 325 MG tablet Take 650 mg by mouth every 6 hours as needed for Pain   Yes Historical Provider, MD   famotidine (PEPCID) 10 MG tablet 1 po daily. Administer after dialysis on dialysis days (M, W, F) 9/15/20  Yes Angelo Villalta DO   hydrALAZINE (APRESOLINE) 50 MG tablet Take 1 tablet by mouth 2 times daily 11/19/19  Yes Angelo Villalta DO   sevelamer (RENVELA) 800 MG tablet Take 1 tablet by mouth 3 times daily (with meals)   Yes Historical Provider, MD        Allergies:       Sensipar  [cinacalcet]    Review ofSystems:     Positive and Negative as described in HPI    Review of Systems    Physical Exam:     Vitals:  /60   Pulse 59   Ht 5' 4\" (1.626 m)   Wt 159 lb (72.1 kg)   SpO2 99%   BMI 27.29 kg/m²   Physical Exam  Vitals signs and nursing note reviewed. Constitutional:       General: He is not in acute distress. Appearance: Normal appearance. He is well-developed. He is not ill-appearing. Cardiovascular:      Rate and Rhythm: Normal rate and regular rhythm. Heart sounds: Normal heart sounds. Comments: No peripheral edema. Pulmonary:      Effort: Pulmonary effort is normal.      Breath sounds: Normal breath sounds. No wheezing or rales. Neurological:      Mental Status: He is alert.    Psychiatric:         Mood and Affect: Mood normal.         Behavior: Behavior normal.         Data:     Lab Results   Component Value Date     12/26/2020    K 6.9 12/26/2020    CL 90 12/26/2020    CO2 28 12/26/2020    BUN 56 12/26/2020    CREATININE 14.92 12/26/2020    GLUCOSE 99 12/26/2020    PROT 7.0 12/26/2020    LABALBU 3.8 12/26/2020    BILITOT 0.26 12/26/2020    ALKPHOS 54 12/26/2020    AST 13 12/26/2020    ALT 13 12/26/2020     Lab Results   Component Value Date    WBC 5.5 12/26/2020    RBC 3.39 12/26/2020    HGB 10.8 12/26/2020    HCT 31.8 12/26/2020    MCV 94.0 12/26/2020    MCH 31.9 12/26/2020    MCHC 34.0 12/26/2020    RDW 14.4 12/26/2020     12/26/2020    MPV NOT REPORTED 12/26/2020     Lab Results   Component Value Date    TSH 1.18 02/09/2019     Lab Results   Component Value Date    CHOL 153 02/09/2019    HDL 39 02/09/2019    LABA1C 4.6 09/20/2016 12/26/2020 chest x-ray     Trachea is midline. Mediastinum is not widened. Heart size is mildly prominent. Mild diffuse scattered infiltrates are noted bilaterally. Right side is    affected more than the left. No effusion or nodule or pneumothorax is noted. Diaphragm and bony elements are intact. 1/14/2021 chest x-ray     1. Cholelithiasis.       2. Mild improvement since 12/26/2020 but significant infiltrates remain    bilaterally without effusion. Assessment & Plan:        Diagnosis Orders   1. Essential hypertension     2. End stage renal failure on dialysis ([address]     3. Secondary hyperparathyroidism ([address]     4. Upper back pain     5. History of viral pneumonia     Hypertension controlled in the office today. His readings are a little high at arrival to dialysis but no med changes have been made by nephrology. May be better to allow readings to run a little higher than to avoid excessive hypotension at the end of his session. Advised it would be a good idea for him to get a home blood pressure cuff that he can use to monitor on nondialysis days.   ESRD on dialysis Monday Wednesday Friday, stable, continue same  Hyperparathyroid, reviewed most recent calcium level available to me which was slightly low, 8.4 on 12/26. Handled by nephrology. Currently on parsabiv. Upper back pain greatly improved. I had had concern that it was r/t covid pna which had persisted on repeat cxr performed after last visit. Pain has improved and at this point seems to be MSK in nature, better with certain exercises. Cont to monitor. No repeat cxr needed. Requested Prescriptions     Signed Prescriptions Disp Refills    metoprolol succinate (TOPROL XL) 100 MG extended release tablet 90 tablet 3     Sig: TAKE 1 TABLET BY MOUTH EVERY DAY    amLODIPine (NORVASC) 10 MG tablet 90 tablet 3     Sig: TAKE 1 TABLET BY MOUTH EVERY DAY         Patient Instructions   SURVEY:    You may be receiving a survey from [employer] regarding your visit today. You may get this in the mail, through your MyChart or in your email. Please complete the survey to enable us to provide the highest quality of care to you and your family. If you cannot score us as very good ( 5 Stars) on any question, please feel free to call the office to discuss how we could have made your experience exceptional.     Thank you. Clinical Care Team:  Dr. Flip Pinto DO                                           [facility], [address] [place] [address] Team:  Bety Hardin received counseling on the following healthy behaviors: medication adherence  Reviewed prior labs and health maintenance. Continue current medications, diet and exercise. Discussed use, benefit, and side effects of prescribed medications. Barriers to medication compliance addressed. Patient given educational materials - see patient instructions. All patient questions answered. Patient voiced understanding.        Electronically signed by Flip Pinto DO on 3/16/2021 at 9:44 AM  [address] [place], [address]  Dept: [phone]

## 2021-05-03 ENCOUNTER — HOSPITAL ENCOUNTER (OUTPATIENT)
Age: 46
Setting detail: OBSERVATION
Discharge: HOME | End: 2021-05-04
Attending: INTERNAL MEDICINE | Admitting: INTERNAL MEDICINE
Payer: COMMERCIAL

## 2021-05-03 ENCOUNTER — APPOINTMENT (OUTPATIENT)
Dept: GENERAL RADIOLOGY | Age: 46
End: 2021-05-03
Payer: COMMERCIAL

## 2021-05-03 DIAGNOSIS — I10 ESSENTIAL HYPERTENSION: ICD-10-CM

## 2021-05-03 DIAGNOSIS — R77.8 ELEVATED TROPONIN: ICD-10-CM

## 2021-05-03 DIAGNOSIS — Z92.89 HISTORY OF HEMODIALYSIS: ICD-10-CM

## 2021-05-03 DIAGNOSIS — R07.9 CHEST PAIN, UNSPECIFIED TYPE: Primary | ICD-10-CM

## 2021-05-03 PROBLEM — R07.89 OTHER CHEST PAIN: Status: ACTIVE | Noted: 2021-05-03

## 2021-05-03 LAB
ABSOLUTE EOS #: 0.2 K/UL (ref 0–0.4)
ABSOLUTE IMMATURE GRANULOCYTE: NORMAL K/UL (ref 0–0.3)
ABSOLUTE LYMPH #: 1.6 K/UL (ref 1–4.8)
ABSOLUTE MONO #: 0.6 K/UL (ref 0–1)
ALBUMIN SERPL-MCNC: 5.1 G/DL (ref 3.5–5.2)
ALBUMIN/GLOBULIN RATIO: ABNORMAL (ref 1–2.5)
ALP BLD-CCNC: 100 U/L (ref 40–129)
ALT SERPL-CCNC: 12 U/L (ref 5–41)
ANION GAP SERPL CALCULATED.3IONS-SCNC: 16 MMOL/L (ref 9–17)
AST SERPL-CCNC: 15 U/L
BASOPHILS # BLD: 1 % (ref 0–2)
BASOPHILS ABSOLUTE: 0 K/UL (ref 0–0.2)
BILIRUB SERPL-MCNC: 0.36 MG/DL (ref 0.3–1.2)
BUN BLDV-MCNC: 33 MG/DL (ref 6–20)
BUN/CREAT BLD: ABNORMAL (ref 9–20)
CALCIUM SERPL-MCNC: 9.1 MG/DL (ref 8.6–10.4)
CHLORIDE BLD-SCNC: 96 MMOL/L (ref 98–107)
CO2: 28 MMOL/L (ref 20–31)
CREAT SERPL-MCNC: 7.08 MG/DL (ref 0.7–1.2)
DIFFERENTIAL TYPE: YES
EOSINOPHILS RELATIVE PERCENT: 3 % (ref 0–5)
GFR AFRICAN AMERICAN: 10 ML/MIN
GFR NON-AFRICAN AMERICAN: 8 ML/MIN
GFR SERPL CREATININE-BSD FRML MDRD: ABNORMAL ML/MIN/{1.73_M2}
GFR SERPL CREATININE-BSD FRML MDRD: ABNORMAL ML/MIN/{1.73_M2}
GLUCOSE BLD-MCNC: 97 MG/DL (ref 70–99)
HCT VFR BLD CALC: 42.7 % (ref 41–53)
HEMOGLOBIN: 14.4 G/DL (ref 13.5–17.5)
IMMATURE GRANULOCYTES: NORMAL %
LYMPHOCYTES # BLD: 21 % (ref 13–44)
MCH RBC QN AUTO: 31.5 PG (ref 26–34)
MCHC RBC AUTO-ENTMCNC: 33.8 G/DL (ref 31–37)
MCV RBC AUTO: 93.2 FL (ref 80–100)
MONOCYTES # BLD: 8 % (ref 5–9)
NRBC AUTOMATED: NORMAL PER 100 WBC
PDW BLD-RTO: 14.8 % (ref 12.1–15.2)
PLATELET # BLD: 243 K/UL (ref 140–450)
PLATELET ESTIMATE: NORMAL
PMV BLD AUTO: NORMAL FL (ref 6–12)
POTASSIUM SERPL-SCNC: 4 MMOL/L (ref 3.7–5.3)
RBC # BLD: 4.58 M/UL (ref 4.5–5.9)
RBC # BLD: NORMAL 10*6/UL
SARS-COV-2, RAPID: NOT DETECTED
SEG NEUTROPHILS: 67 % (ref 39–75)
SEGMENTED NEUTROPHILS ABSOLUTE COUNT: 5 K/UL (ref 2.1–6.5)
SODIUM BLD-SCNC: 140 MMOL/L (ref 135–144)
SPECIMEN DESCRIPTION: NORMAL
TOTAL PROTEIN: 8.6 G/DL (ref 6.4–8.3)
TROPONIN INTERP: ABNORMAL
TROPONIN T: ABNORMAL NG/ML
TROPONIN, HIGH SENSITIVITY: 39 NG/L (ref 0–22)
TROPONIN, HIGH SENSITIVITY: 40 NG/L (ref 0–22)
TROPONIN, HIGH SENSITIVITY: 41 NG/L (ref 0–22)
TROPONIN, HIGH SENSITIVITY: 44 NG/L (ref 0–22)
WBC # BLD: 7.3 K/UL (ref 3.5–11)
WBC # BLD: NORMAL 10*3/UL

## 2021-05-03 PROCEDURE — 94761 N-INVAS EAR/PLS OXIMETRY MLT: CPT

## 2021-05-03 PROCEDURE — G0378 HOSPITAL OBSERVATION PER HR: HCPCS

## 2021-05-03 PROCEDURE — 93005 ELECTROCARDIOGRAM TRACING: CPT | Performed by: INTERNAL MEDICINE

## 2021-05-03 PROCEDURE — 99284 EMERGENCY DEPT VISIT MOD MDM: CPT

## 2021-05-03 PROCEDURE — 85025 COMPLETE CBC W/AUTO DIFF WBC: CPT

## 2021-05-03 PROCEDURE — 87635 SARS-COV-2 COVID-19 AMP PRB: CPT

## 2021-05-03 PROCEDURE — 84484 ASSAY OF TROPONIN QUANT: CPT

## 2021-05-03 PROCEDURE — 96372 THER/PROPH/DIAG INJ SC/IM: CPT

## 2021-05-03 PROCEDURE — 6370000000 HC RX 637 (ALT 250 FOR IP): Performed by: INTERNAL MEDICINE

## 2021-05-03 PROCEDURE — 2580000003 HC RX 258: Performed by: INTERNAL MEDICINE

## 2021-05-03 PROCEDURE — 36415 COLL VENOUS BLD VENIPUNCTURE: CPT

## 2021-05-03 PROCEDURE — 80053 COMPREHEN METABOLIC PANEL: CPT

## 2021-05-03 PROCEDURE — C9803 HOPD COVID-19 SPEC COLLECT: HCPCS

## 2021-05-03 PROCEDURE — 71045 X-RAY EXAM CHEST 1 VIEW: CPT

## 2021-05-03 PROCEDURE — 6360000002 HC RX W HCPCS: Performed by: INTERNAL MEDICINE

## 2021-05-03 RX ORDER — SODIUM CHLORIDE 0.9 % (FLUSH) 0.9 %
5-40 SYRINGE (ML) INJECTION PRN
Status: DISCONTINUED | OUTPATIENT
Start: 2021-05-03 | End: 2021-05-04 | Stop reason: HOSPADM

## 2021-05-03 RX ORDER — LANOLIN ALCOHOL/MO/W.PET/CERES
3 CREAM (GRAM) TOPICAL NIGHTLY PRN
Status: DISCONTINUED | OUTPATIENT
Start: 2021-05-03 | End: 2021-05-04 | Stop reason: HOSPADM

## 2021-05-03 RX ORDER — SODIUM CHLORIDE 9 MG/ML
25 INJECTION, SOLUTION INTRAVENOUS PRN
Status: DISCONTINUED | OUTPATIENT
Start: 2021-05-03 | End: 2021-05-04 | Stop reason: HOSPADM

## 2021-05-03 RX ORDER — AMLODIPINE BESYLATE 10 MG/1
10 TABLET ORAL DAILY
Status: DISCONTINUED | OUTPATIENT
Start: 2021-05-04 | End: 2021-05-04 | Stop reason: HOSPADM

## 2021-05-03 RX ORDER — ASPIRIN 81 MG/1
81 TABLET, CHEWABLE ORAL DAILY
Status: DISCONTINUED | OUTPATIENT
Start: 2021-05-04 | End: 2021-05-04 | Stop reason: HOSPADM

## 2021-05-03 RX ORDER — SEVELAMER CARBONATE 800 MG/1
800 TABLET, FILM COATED ORAL
Status: DISCONTINUED | OUTPATIENT
Start: 2021-05-03 | End: 2021-05-04 | Stop reason: HOSPADM

## 2021-05-03 RX ORDER — HYDRALAZINE HYDROCHLORIDE 25 MG/1
50 TABLET, FILM COATED ORAL 2 TIMES DAILY
Status: DISCONTINUED | OUTPATIENT
Start: 2021-05-03 | End: 2021-05-04 | Stop reason: HOSPADM

## 2021-05-03 RX ORDER — PROMETHAZINE HYDROCHLORIDE 25 MG/1
12.5 TABLET ORAL EVERY 6 HOURS PRN
Status: DISCONTINUED | OUTPATIENT
Start: 2021-05-03 | End: 2021-05-04 | Stop reason: HOSPADM

## 2021-05-03 RX ORDER — ONDANSETRON 2 MG/ML
4 INJECTION INTRAMUSCULAR; INTRAVENOUS EVERY 6 HOURS PRN
Status: DISCONTINUED | OUTPATIENT
Start: 2021-05-03 | End: 2021-05-04 | Stop reason: HOSPADM

## 2021-05-03 RX ORDER — SODIUM CHLORIDE 0.9 % (FLUSH) 0.9 %
5-40 SYRINGE (ML) INJECTION EVERY 12 HOURS SCHEDULED
Status: DISCONTINUED | OUTPATIENT
Start: 2021-05-03 | End: 2021-05-04 | Stop reason: HOSPADM

## 2021-05-03 RX ORDER — POLYETHYLENE GLYCOL 3350 17 G/17G
17 POWDER, FOR SOLUTION ORAL DAILY PRN
Status: DISCONTINUED | OUTPATIENT
Start: 2021-05-03 | End: 2021-05-04 | Stop reason: HOSPADM

## 2021-05-03 RX ORDER — HEPARIN SODIUM 5000 [USP'U]/ML
5000 INJECTION, SOLUTION INTRAVENOUS; SUBCUTANEOUS EVERY 8 HOURS SCHEDULED
Status: DISCONTINUED | OUTPATIENT
Start: 2021-05-03 | End: 2021-05-04 | Stop reason: HOSPADM

## 2021-05-03 RX ORDER — ATORVASTATIN CALCIUM 40 MG/1
40 TABLET, FILM COATED ORAL NIGHTLY
Status: DISCONTINUED | OUTPATIENT
Start: 2021-05-03 | End: 2021-05-04 | Stop reason: HOSPADM

## 2021-05-03 RX ORDER — METOPROLOL SUCCINATE 50 MG/1
100 TABLET, EXTENDED RELEASE ORAL DAILY
Status: DISCONTINUED | OUTPATIENT
Start: 2021-05-03 | End: 2021-05-04 | Stop reason: HOSPADM

## 2021-05-03 RX ORDER — ACETAMINOPHEN 325 MG/1
650 TABLET ORAL EVERY 6 HOURS PRN
Status: DISCONTINUED | OUTPATIENT
Start: 2021-05-03 | End: 2021-05-04 | Stop reason: HOSPADM

## 2021-05-03 RX ADMIN — HEPARIN SODIUM 5000 UNITS: 5000 INJECTION INTRAVENOUS; SUBCUTANEOUS at 17:40

## 2021-05-03 RX ADMIN — NITROGLYCERIN 1 INCH: 20 OINTMENT TOPICAL at 17:40

## 2021-05-03 RX ADMIN — HYDRALAZINE HYDROCHLORIDE 50 MG: 25 TABLET, FILM COATED ORAL at 20:30

## 2021-05-03 RX ADMIN — SEVELAMER CARBONATE 800 MG: 800 TABLET, FILM COATED ORAL at 17:40

## 2021-05-03 RX ADMIN — HEPARIN SODIUM 5000 UNITS: 5000 INJECTION INTRAVENOUS; SUBCUTANEOUS at 20:30

## 2021-05-03 RX ADMIN — NITROGLYCERIN 1 INCH: 20 OINTMENT TOPICAL at 23:45

## 2021-05-03 RX ADMIN — SODIUM CHLORIDE, PRESERVATIVE FREE 10 ML: 5 INJECTION INTRAVENOUS at 20:43

## 2021-05-03 RX ADMIN — ATORVASTATIN CALCIUM 40 MG: 40 TABLET, FILM COATED ORAL at 20:30

## 2021-05-03 ASSESSMENT — HEART SCORE: ECG: 0

## 2021-05-03 ASSESSMENT — PAIN DESCRIPTION - DESCRIPTORS: DESCRIPTORS: ACHING

## 2021-05-03 ASSESSMENT — PAIN SCALES - GENERAL
PAINLEVEL_OUTOF10: 0

## 2021-05-03 ASSESSMENT — PAIN DESCRIPTION - FREQUENCY: FREQUENCY: CONTINUOUS

## 2021-05-03 NOTE — ED PROVIDER NOTES
End-stage renal disease on hemodialysis ([facility] [address]     since 2006, due to polycystic kidney disease    Hypertension     Hypertriglyceridemia          SURGICAL HISTORY       Past Surgical History:   Procedure Laterality Date    DIALYSIS FISTULA CREATION      THYROID LOBECTOMY  2012 [address]       Current Discharge Medication List      CONTINUE these medications which have NOT CHANGED    Details   Sucroferric Oxyhydroxide (VELPHORO) 500 MG CHEW Take 2 tablets by mouth      Melatonin 3 MG CAPS Take by mouth      Etelcalcetide HCl (PARSABIV IV) 5 mg      metoprolol succinate (TOPROL XL) 100 MG extended release tablet TAKE 1 TABLET BY MOUTH EVERY DAY  Qty: 90 tablet, Refills: 3      amLODIPine (NORVASC) 10 MG tablet TAKE 1 TABLET BY MOUTH EVERY DAY  Qty: 90 tablet, Refills: 3      lidocaine-prilocaine (EMLA) 2.5-2.5 % cream APPLY SMALL AMOUNT TO ACCESS SITE (AVF) 1 HOUR BEFORE DIALYSIS. COVER WITH OCCLUSIVE DRESSING (SARAN WRAP)      ergocalciferol (ERGOCALCIFEROL) 1.25 MG (86838 UT) capsule TAKE 1 CAPSULE BY MOUTH EVERY 2 WEEKS      acetaminophen (TYLENOL) 325 MG tablet Take 650 mg by mouth every 6 hours as needed for Pain      hydrALAZINE (APRESOLINE) 50 MG tablet Take 1 tablet by mouth 2 times daily  Qty: 60 tablet, Refills: 5      sevelamer (RENVELA) 800 MG tablet Take 1 tablet by mouth 3 times daily (with meals)             ALLERGIES     Sensipar  [cinacalcet]    FAMILY HISTORY     History reviewed. No pertinent family history.        SOCIAL HISTORY       Social History     Socioeconomic History    Marital status:      Spouse name: None    Number of children: None    Years of education: None    Highest education level: None   Occupational History    None   Social Needs    Financial resource strain: Not hard at all   [employer] insecurity     Worry: Never true     Inability: Never true   [employer] needs     Medical: None     Non-medical: None   Tobacco Use    Smoking status: heartsounds and intact distal pulses. Exam reveals no gallop or friction rub. No murmur heard. Dialysis fistula left upper arm with bruit. Pulmonary/Chest: Effort normal and breath sounds are symmetric and normal. No respiratory distress. There are no wheezes, rales or rhonchi. No tenderness is exhibited upon palpation of the chest wall. Abdominal: Soft. Bowel sounds are normal. No distension or no mass exhibitted. There is no tenderness, rebound, rigidity or guarding. Genitourinary:   No CVA tenderness noted on examination. Musculoskeletal: Normal range of motion. No edema, tenderness or deformity. Lymphadenopathy:  No cervical adenopathy. Neurological:   alert and oriented to person, place, and time. Normal speech, normal comprehension, normal cognition,  Reflexes are normal.  There are no cranial nerve deficits. Normal muscle tone, motor and sensory function including SILT exhibited. Coordination normal and gait normal. Strength 5/5 in all extremities and torso. Skin: Skin is warm and dry. No rash noted. No diaphoresis. No erythema. No pallor. Psychiatric: Pleasant and cooperative. Normal mood and affect. Behavior is  normal. Judgment and thought content normal.     DIAGNOSTIC RESULTS     EKG: All EKG's are interpreted by the Emergency Department Physician who either signs or Co-signs this chart in the absence of a cardiologist.    A 12-lead EKG was performed at 1219. There is sinus bradycardia with a ventricular rate of 56 bpm.  There is a normal MI interval, QRS duration, QT, QTC and axis. No acute ST segment or T wave changes are identified. Normal EKG    RADIOLOGY:   Non-plain film images such as CT, Ultrasoundand MRI are read by the radiologist. Plain radiographic images are visualized and preliminarily interpreted by the emergency physician with the below findings:    Not indicated.     Interpretation per the Radiologist below, if available at the time of this note:    XR CHEST PORTABLE Final Result   Negative chest.            NM Cardiac Stress Test Nuclear Imaging    (Results Pending)         ED BEDSIDE ULTRASOUND:   Performed by ED Physician - none    LABS:  Labs Reviewed   COMPREHENSIVE METABOLIC PANEL W/ REFLEX TO MG FOR LOW K - Abnormal; Notable for the following components:       Result Value    BUN 33 (*)     CREATININE 7.08 (*)     Chloride 96 (*)     Total Protein 8.6 (*)     GFR Non- 8 (*)     GFR  10 (*)     All other components within normal limits   TROPONIN - Abnormal; Notable for the following components:    Troponin, High Sensitivity 44 (*)     All other components within normal limits   TROPONIN - Abnormal; Notable for the following components:    Troponin, High Sensitivity 41 (*)     All other components within normal limits   TROPONIN - Abnormal; Notable for the following components:    Troponin, High Sensitivity 40 (*)     All other components within normal limits   TROPONIN - Abnormal; Notable for the following components:    Troponin, High Sensitivity 39 (*)     All other components within normal limits   COVID-19, RAPID   CBC WITH AUTO DIFFERENTIAL   CBC       All other labs were within normal range or not returned as of this dictation. EMERGENCY DEPARTMENT COURSE and DIFFERENTIAL DIAGNOSIS/MDM:   Vitals:    Vitals:    05/03/21 1900 05/03/21 1945 05/03/21 2158 05/03/21 2350   BP:  (!) 150/81  138/77   Pulse:  54  55   Resp:  16  16   Temp:  98.4 °F (36.9 °C)  98.3 °F (36.8 °C)   TempSrc:  Oral  Oral   SpO2: 98% 96% 96% 100%   Weight:       Height:           Noted    MDM    CRITICAL CARE TIME   Total Critical Care time was 0 minutes. [facility]  ED Course as of May 04 0425   Mon May 03, 2021   1300 No chest pain or pressure.     [MS]   1046 I spoke with Dr. Benita Piña who stated that we may keep this patient at this facility and he will evaluate the patient in the morning.    [MS]   [phone] I requested that a call be placed to the hospitalist to discuss admission for observation. [MS]   [phone] I spoke with Dr. April Ann who accepted patient for observation status for further cardiac evaluations. [MS]   46 Dr. Moose Smalls and was here to perform bedside echo. He stated the [facility] is planned for the morning.    [MS]      ED Course User Index  [MS] Demond Rojas MD       CONSULTS:  IP CONSULT TO CARDIOLOGY  IP CONSULT TO SOCIAL WORK    PROCEDURES:  Unless otherwise noted below, none     Procedures      Summation    Yamil Lyn is a 41 y.o. male who has a history of end-stage renal disease on dialysis, hypertension, hyperlipidemia, arthritis, polycystic kidney disease, glomerulonephritis, hyperparathyroidism, presented for resolved chest pain during dialysis. His high-sensitivity troponins were slightly elevated at 44, improved to 41 one hour later. Previous troponins evaluated last year were normal even with end-stage kidney disease. He was admitted to the medical surgical service for observation for chest pain evaluation. Heart Score 2 with MACE <2.5%. He is well hydrated, pain-free, nontoxic, hemodynamically stable, neurologically intact, and satisfactory for iOBSERVATION on the medical surgical service with telemetry. Findings discussed at length with patient and family. I gave them ample opportunity to ask questions     The patient was evaluated during the global COVID-19  pandemic, and that diagnosis was suspected/considered upon their initial presentation. Their evaluation, treatment and testing was consistent with current guidelines for patients who present with complaints or symptoms that may be related to COVID-19 . The patient did meet criteria for screening for COVID-19 testing per current protocol. COVID negative. Patient Course:   ED Course as of May 04 0425   Mon May 03, 2021   1300 No chest pain or pressure.     [MS]   3696 I spoke with Dr. Moose Smalls who stated that we may keep this patient at this facility and he will evaluate the patient in the morning.    [MS]   1412 I requested that a call be placed to the hospitalist to discuss admission for observation. [MS]   [phone] I spoke with Dr. Arturo Be who accepted patient for observation status for further cardiac evaluations. [MS]   46 Dr. Chandana Viveros and was here to perform bedside echo.   He stated the [facility] is planned for the morning.    [MS]      ED Course User Index  [MS] Ni Ball MD         ED Medicationsadministered this visit:    Medications   acetaminophen (TYLENOL) tablet 650 mg (has no administration in time range)   amLODIPine (NORVASC) tablet 10 mg (has no administration in time range)   hydrALAZINE (APRESOLINE) tablet 50 mg (50 mg Oral Given 5/3/21 2030)   melatonin tablet 3 mg (has no administration in time range)   metoprolol succinate (TOPROL XL) extended release tablet 100 mg (100 mg Oral Not Given 5/3/21 1621)   sevelamer (RENVELA) tablet 800 mg (800 mg Oral Given 5/3/21 1740)   sodium chloride flush 0.9 % injection 5-40 mL (10 mLs Intravenous Given 5/3/21 2043)   sodium chloride flush 0.9 % injection 5-40 mL (has no administration in time range)   0.9 % sodium chloride infusion (has no administration in time range)   promethazine (PHENERGAN) tablet 12.5 mg (has no administration in time range)     Or   ondansetron (ZOFRAN) injection 4 mg (has no administration in time range)   polyethylene glycol (GLYCOLAX) packet 17 g (has no administration in time range)   aspirin chewable tablet 81 mg (has no administration in time range)   atorvastatin (LIPITOR) tablet 40 mg (40 mg Oral Given 5/3/21 2030)   nitroglycerin (NITRO-BID) 2 % ointment 1 inch (1 inch Topical Given 5/3/21 2345)   heparin (porcine) injection 5,000 Units (5,000 Units Subcutaneous Given 5/3/21 2030)   regadenoson (LEXISCAN) injection 0.4 mg (has no administration in time range)       New Prescriptions from this visit:    Current Discharge Medication List          Follow-up:  No follow-up provider specified. Final Impression:   1. Chest pain, unspecified type    2. History of hemodialysis    3. Essential hypertension    4. Elevated troponin               (Please note that portions of this note werecompleted with a voice recognition program.  Efforts were made to edit the dictations but occasionally words are mis-transcribed.)    FINAL IMPRESSION      1. Chest pain, unspecified type    2. History of hemodialysis    3. Essential hypertension    4. Elevated troponin          DISPOSITION/PLAN   DISPOSITION        PATIENT REFERRED TO:  No follow-up provider specified.     DISCHARGE MEDICATIONS:  Current Discharge Medication List             (Please note that portions of this note were completed with a voice recognition program.  Efforts were made to edit the dictations but occasionally words are mis-transcribed.)    Nicolas Patterson MD (electronically signed)  Attending Emergency Physician            Nicolas Patterson MD  05/04/21 6365

## 2021-05-03 NOTE — PROGRESS NOTES
Cardiology    1. Chest pressure during dialysis over past several months, worse today, which resolved after dialysis  2. Detectable trop with no change in repeat trop  3. Normal LV function by bedside echocardiogram with EF 60%  4. End stage renal on dialysis since 11-7-2006  5. Thyroid lobectomy in 2012  6. Covid 19 in December 2020 with fatigue since then  7. No cardiac history    He has been having chest pain with dialysis since covid infection. Worse today during dialysis. Resolved after dialysis. No diaphoresis, although did have mild nausea. However, also received distressing news concerning health of his daughter yesterday, and has been very worried about her. No exertional chest pain. Works full time at [employer]. No previous cardiac history. Does not smoke or drink ETOH. No family hx of cardiac disease. Will do lexiscan CST in AM.  Bedside echo shows normal LV function. If CST ok, can discharge and look for non cardiac etiologies. Stable at this time.     Thanks, Eric Zepeda ME

## 2021-05-04 ENCOUNTER — TELEPHONE (OUTPATIENT)
Dept: FAMILY MEDICINE CLINIC | Age: 46
End: 2021-05-04

## 2021-05-04 ENCOUNTER — APPOINTMENT (OUTPATIENT)
Dept: NUCLEAR MEDICINE | Age: 46
End: 2021-05-04
Payer: COMMERCIAL

## 2021-05-04 VITALS
HEIGHT: 64 IN | HEART RATE: 68 BPM | WEIGHT: 161 LBS | RESPIRATION RATE: 16 BRPM | TEMPERATURE: 98.3 F | DIASTOLIC BLOOD PRESSURE: 97 MMHG | SYSTOLIC BLOOD PRESSURE: 162 MMHG | BODY MASS INDEX: 27.49 KG/M2 | OXYGEN SATURATION: 99 %

## 2021-05-04 LAB
EKG ATRIAL RATE: 56 BPM
EKG P AXIS: -6 DEGREES
EKG P-R INTERVAL: 162 MS
EKG Q-T INTERVAL: 466 MS
EKG QRS DURATION: 90 MS
EKG QTC CALCULATION (BAZETT): 449 MS
EKG R AXIS: -9 DEGREES
EKG T AXIS: 53 DEGREES
EKG VENTRICULAR RATE: 56 BPM
HCT VFR BLD CALC: 38.6 % (ref 41–53)
HEMOGLOBIN: 12.9 G/DL (ref 13.5–17.5)
MCH RBC QN AUTO: 31.4 PG (ref 26–34)
MCHC RBC AUTO-ENTMCNC: 33.5 G/DL (ref 31–37)
MCV RBC AUTO: 93.5 FL (ref 80–100)
NRBC AUTOMATED: ABNORMAL PER 100 WBC
PDW BLD-RTO: 14.9 % (ref 12.1–15.2)
PLATELET # BLD: 225 K/UL (ref 140–450)
PMV BLD AUTO: ABNORMAL FL (ref 6–12)
RBC # BLD: 4.13 M/UL (ref 4.5–5.9)
WBC # BLD: 6.6 K/UL (ref 3.5–11)

## 2021-05-04 PROCEDURE — 6360000002 HC RX W HCPCS: Performed by: INTERNAL MEDICINE

## 2021-05-04 PROCEDURE — 93017 CV STRESS TEST TRACING ONLY: CPT

## 2021-05-04 PROCEDURE — 94761 N-INVAS EAR/PLS OXIMETRY MLT: CPT

## 2021-05-04 PROCEDURE — 2580000003 HC RX 258: Performed by: INTERNAL MEDICINE

## 2021-05-04 PROCEDURE — 99214 OFFICE O/P EST MOD 30 MIN: CPT | Performed by: INTERNAL MEDICINE

## 2021-05-04 PROCEDURE — 93005 ELECTROCARDIOGRAM TRACING: CPT | Performed by: INTERNAL MEDICINE

## 2021-05-04 PROCEDURE — 93010 ELECTROCARDIOGRAM REPORT: CPT | Performed by: INTERNAL MEDICINE

## 2021-05-04 PROCEDURE — 6370000000 HC RX 637 (ALT 250 FOR IP): Performed by: INTERNAL MEDICINE

## 2021-05-04 PROCEDURE — 78452 HT MUSCLE IMAGE SPECT MULT: CPT

## 2021-05-04 PROCEDURE — G0378 HOSPITAL OBSERVATION PER HR: HCPCS

## 2021-05-04 PROCEDURE — 36415 COLL VENOUS BLD VENIPUNCTURE: CPT

## 2021-05-04 PROCEDURE — A9500 TC99M SESTAMIBI: HCPCS | Performed by: INTERNAL MEDICINE

## 2021-05-04 PROCEDURE — 3430000000 HC RX DIAGNOSTIC RADIOPHARMACEUTICAL: Performed by: INTERNAL MEDICINE

## 2021-05-04 PROCEDURE — 85027 COMPLETE CBC AUTOMATED: CPT

## 2021-05-04 PROCEDURE — 96372 THER/PROPH/DIAG INJ SC/IM: CPT

## 2021-05-04 RX ORDER — SODIUM CHLORIDE 0.9 % (FLUSH) 0.9 %
5-40 SYRINGE (ML) INJECTION PRN
Status: DISCONTINUED | OUTPATIENT
Start: 2021-05-04 | End: 2021-05-04 | Stop reason: HOSPADM

## 2021-05-04 RX ORDER — ALBUTEROL SULFATE 2.5 MG/3ML
2.5 SOLUTION RESPIRATORY (INHALATION) EVERY 6 HOURS PRN
Status: CANCELLED | OUTPATIENT
Start: 2021-05-04

## 2021-05-04 RX ORDER — NITROGLYCERIN 0.4 MG/1
0.4 TABLET SUBLINGUAL EVERY 5 MIN PRN
Status: CANCELLED | OUTPATIENT
Start: 2021-05-04 | End: 2021-05-04

## 2021-05-04 RX ORDER — METOPROLOL TARTRATE 5 MG/5ML
5 INJECTION INTRAVENOUS EVERY 5 MIN PRN
Status: CANCELLED | OUTPATIENT
Start: 2021-05-04 | End: 2021-05-04

## 2021-05-04 RX ORDER — SODIUM CHLORIDE 9 MG/ML
500 INJECTION, SOLUTION INTRAVENOUS CONTINUOUS PRN
Status: CANCELLED | OUTPATIENT
Start: 2021-05-04 | End: 2021-05-04

## 2021-05-04 RX ORDER — ATROPINE SULFATE 0.1 MG/ML
0.5 INJECTION INTRAVENOUS EVERY 5 MIN PRN
Status: CANCELLED | OUTPATIENT
Start: 2021-05-04 | End: 2021-05-04

## 2021-05-04 RX ORDER — AMINOPHYLLINE DIHYDRATE 25 MG/ML
50 INJECTION, SOLUTION INTRAVENOUS PRN
Status: DISCONTINUED | OUTPATIENT
Start: 2021-05-04 | End: 2021-05-04 | Stop reason: HOSPADM

## 2021-05-04 RX ADMIN — AMLODIPINE BESYLATE 10 MG: 10 TABLET ORAL at 09:58

## 2021-05-04 RX ADMIN — SODIUM CHLORIDE, PRESERVATIVE FREE 10 ML: 5 INJECTION INTRAVENOUS at 09:58

## 2021-05-04 RX ADMIN — SODIUM CHLORIDE, PRESERVATIVE FREE 10 ML: 5 INJECTION INTRAVENOUS at 11:49

## 2021-05-04 RX ADMIN — METOPROLOL SUCCINATE 100 MG: 50 TABLET, EXTENDED RELEASE ORAL at 09:58

## 2021-05-04 RX ADMIN — HEPARIN SODIUM 5000 UNITS: 5000 INJECTION INTRAVENOUS; SUBCUTANEOUS at 05:06

## 2021-05-04 RX ADMIN — ASPIRIN 81 MG: 81 TABLET, CHEWABLE ORAL at 09:58

## 2021-05-04 RX ADMIN — TETRAKIS(2-METHOXYISOBUTYLISOCYANIDE)COPPER(I) TETRAFLUOROBORATE 30 MILLICURIE: 1 INJECTION, POWDER, LYOPHILIZED, FOR SOLUTION INTRAVENOUS at 11:53

## 2021-05-04 RX ADMIN — HYDRALAZINE HYDROCHLORIDE 50 MG: 25 TABLET, FILM COATED ORAL at 09:58

## 2021-05-04 RX ADMIN — SEVELAMER CARBONATE 800 MG: 800 TABLET, FILM COATED ORAL at 09:59

## 2021-05-04 RX ADMIN — NITROGLYCERIN 1 INCH: 20 OINTMENT TOPICAL at 05:06

## 2021-05-04 RX ADMIN — REGADENOSON 0.4 MG: 0.08 INJECTION, SOLUTION INTRAVENOUS at 11:49

## 2021-05-04 RX ADMIN — TETRAKIS(2-METHOXYISOBUTYLISOCYANIDE)COPPER(I) TETRAFLUOROBORATE 10 MILLICURIE: 1 INJECTION, POWDER, LYOPHILIZED, FOR SOLUTION INTRAVENOUS at 10:45

## 2021-05-04 ASSESSMENT — PAIN SCALES - GENERAL
PAINLEVEL_OUTOF10: 0

## 2021-05-04 NOTE — PLAN OF CARE
Problem: SAFETY  Goal: Free from accidental physical injury  5/4/2021 1116 by Adelia Churchill RN  Outcome: Ongoing  5/3/2021 2358 by Mila Sherwood RN  Outcome: Ongoing  Goal: Free from intentional harm  5/4/2021 1116 by Adelia Churchill RN  Outcome: Ongoing  5/3/2021 2358 by Mila Sherwood RN  Outcome: Ongoing     Problem: DAILY CARE  Goal: Daily care needs are met  5/4/2021 1116 by Adelia Churchill RN  Outcome: Ongoing  5/3/2021 2358 by Mila Sherwood RN  Outcome: Ongoing     Problem: PAIN  Goal: Patient's pain/discomfort is manageable  5/4/2021 1116 by Adelia Churchill RN  Outcome: Ongoing  5/3/2021 2358 by Mila Sherwood RN  Outcome: Ongoing     Problem: SKIN INTEGRITY  Goal: Skin integrity is maintained or improved  5/4/2021 1116 by Adelia Churchill RN  Outcome: Ongoing  5/3/2021 2358 by Mila Sherwood RN  Outcome: Ongoing     Problem: KNOWLEDGE DEFICIT  Goal: Patient/S.O. demonstrates understanding of disease process, treatment plan, medications, and discharge instructions.   5/3/2021 2358 by Mila Sherwood RN  Outcome: Ongoing     Problem: DISCHARGE BARRIERS  Goal: Patient's continuum of care needs are met  5/4/2021 1116 by Adelia Churchill RN  Outcome: Ongoing  5/3/2021 2358 by Mila Sherwood RN  Outcome: Ongoing     Problem: Cardiac:  Goal: Ability to maintain an adequate cardiac output will improve  Description: Ability to maintain an adequate cardiac output will improve  5/4/2021 1116 by Adelia Churchill RN  Outcome: Ongoing  5/3/2021 2358 by Mila Sherwood RN  Outcome: Ongoing  Goal: Hemodynamic stability will improve  Description: Hemodynamic stability will improve  5/3/2021 2358 by Mila Sherwood RN  Outcome: Ongoing     Problem: Fluid Volume:  Goal: Ability to achieve and maintain adequate urine output will improve  Description: Ability to achieve and maintain adequate urine output will improve  5/4/2021 1116 by Adelia Churchill RN  Outcome:

## 2021-05-04 NOTE — PROGRESS NOTES
Narendra Fitzgerald used (#[id number]) to communicate with pt and perform vitals and assessment. Pt denies CP. Pt informed of stress test time tomorrow. All pt questions answered. Son at bedside, Christopher Dueñas to speak in front of him per pt. Pt up to BR SBA, returns to bed.

## 2021-05-04 NOTE — H&P
History & Physical    Patient:  Keshav Hernandez  YOB: 1975  Date of Service: 5/4/2021  MRN: [id number]   Acct:   [de-identified]   Primary Care Physician: Watson Corcoran DO    Chief Complaint:   Chief Complaint   Patient presents with    Chest Pain     started during dialysis - recieved 2.5 hrs of the 3 hr infusion        History of Present Illness: The patient is a pleasant Setswana-speaking 41 y.o. male presented to the emergency room complaining of chest pressure started during hemodialysis. He describes having chest pressure across his chest, some discomfort in the back, associated nausea. He had no shortness of breath, dizziness, palpitations. The pain was continuous. He also felt discomfort in his upper abdomen. Patient states that he had Covid infection/pneumonia in December and since then still having some residual symptoms of shortness of breath. Patient has no history of cardiac problems. He has no history of diabetes, smoking, no family history of heart disease. Patient's work-up in the emergency room revealed temperature 98.4, heart rate 56, respirations 24, blood pressure 137/88. He was 97% on room air. Portable chest x-ray was negative. Initial troponin was 44 followed by 40. EKG revealed sinus bradycardia with rate 56, no acute ST or T wave changes. CBC was normal, BMP revealed BUN 33, creatinine 7.08 , otherwise unremarkable. Patient was admitted for observation and further work-up of chest pain. He states since admission he did not have any more episodes of chest pressure and has no complaints this morning.           Past Medical History:        Diagnosis Date    End-stage renal disease on hemodialysis ([facility] [address]     since 2006, due to polycystic kidney disease    Hypertension     Hypertriglyceridemia        Past Surgical History:        Procedure Laterality Date    DIALYSIS FISTULA CREATION      THYROID LOBECTOMY  2012    [facility]       Home Medications:   No current facility-administered medications on file prior to encounter. Current Outpatient Medications on File Prior to Encounter   Medication Sig Dispense Refill    Sucroferric Oxyhydroxide (VELPHORO) 500 MG CHEW Take 2 tablets by mouth      Melatonin 3 MG CAPS Take by mouth      Etelcalcetide HCl (PARSABIV IV) 5 mg      metoprolol succinate (TOPROL XL) 100 MG extended release tablet TAKE 1 TABLET BY MOUTH EVERY DAY 90 tablet 3    amLODIPine (NORVASC) 10 MG tablet TAKE 1 TABLET BY MOUTH EVERY DAY 90 tablet 3    lidocaine-prilocaine (EMLA) 2.5-2.5 % cream APPLY SMALL AMOUNT TO ACCESS SITE (AVF) 1 HOUR BEFORE DIALYSIS. COVER WITH OCCLUSIVE DRESSING (SARAN WRAP)      ergocalciferol (ERGOCALCIFEROL) 1.25 MG (40861 UT) capsule TAKE 1 CAPSULE BY MOUTH EVERY 2 WEEKS      acetaminophen (TYLENOL) 325 MG tablet Take 650 mg by mouth every 6 hours as needed for Pain      hydrALAZINE (APRESOLINE) 50 MG tablet Take 1 tablet by mouth 2 times daily 60 tablet 5    sevelamer (RENVELA) 800 MG tablet Take 1 tablet by mouth 3 times daily (with meals)         Allergies:  Sensipar  [cinacalcet]    Social History:    reports that he has never smoked. He has never used smokeless tobacco. He reports that he does not drink alcohol or use drugs. Family History:   Family history is negative for heart disease      Review of systems:  Constitutional: no fever, no night sweats, no fatigue  Head: no headache, no head injury, no migranes. Eye: no blurring of vision, no double vision.   Ears: no hearing difficulty, no tinnitus  Mouth/throat: no ulceration, dental caries, dysphagia  Lungs: no cough, no shortness of breath, no wheeze  CVS: no palpitation, positive for chest pain, no shortness of breath  GI: no abdominal pain, positive for nausea , no vomiting, no constipation  TATYANA: no dysuria, frequency and urgency  Musculoskeletal: no joint pain, swelling , stiffness  Endocrine: no polyuria, polydypsia, no cold or heat Value Ref Range    Specimen Description . NASOPHARYNGEAL SWAB     SARS-CoV-2, Rapid Not Detected Not Detected   Troponin    Collection Time: 05/03/21  4:15 PM   Result Value Ref Range    Troponin, High Sensitivity 40 (H) 0 - 22 ng/L    Troponin T NOT REPORTED <0.03 ng/mL    Troponin Interp NOT REPORTED    Troponin    Collection Time: 05/03/21  7:15 PM   Result Value Ref Range    Troponin, High Sensitivity 39 (H) 0 - 22 ng/L    Troponin T NOT REPORTED <0.03 ng/mL    Troponin Interp NOT REPORTED    CBC    Collection Time: 05/04/21  5:10 AM   Result Value Ref Range    WBC 6.6 3.5 - 11.0 k/uL    RBC 4.13 (L) 4.5 - 5.9 m/uL    Hemoglobin 12.9 (L) 13.5 - 17.5 g/dL    Hematocrit 38.6 (L) 41 - 53 %    MCV 93.5 80 - 100 fL    MCH 31.4 26 - 34 pg    MCHC 33.5 31 - 37 g/dL    RDW 14.9 12.1 - 15.2 %    Platelets 870 470 - 960 k/uL    MPV NOT REPORTED 6.0 - 12.0 fL    NRBC Automated NOT REPORTED per 100 WBC   EKG 12 lead    Collection Time: 05/04/21  6:22 AM   Result Value Ref Range    Ventricular Rate 51 BPM    Atrial Rate 51 BPM    P-R Interval 166 ms    QRS Duration 104 ms    Q-T Interval 464 ms    QTc Calculation (Bazett) 427 ms    P Axis 19 degrees    R Axis 22 degrees    T Axis 54 degrees       Radiology:     Xr Chest Portable    Result Date: 5/3/2021  EXAM: XR CHEST PORTABLE HISTORY: Reason for exam:->chest pain COMPARISON: Chest 1/14/2021 TECHNIQUE: AP portable chest 1209 hours. FINDINGS: Heart size normal. Lungs clear. Bony thorax and upper abdomen normal.     Negative chest.         Assessment/ Plan:    1. Chest pain  - patient's initial EKG was nonacute, troponin was elevated however repeat levels trended down. Patient is on aspirin. He was evaluated by cardiologist Dr. Raul Snow. Recommended 2D echo and Lexiscan stress test.  Possible discharge home later today if stress test is unremarkable  2. End-stage renal disease, on hemodialysis Monday Wednesday Friday -patient had dialysis yesterday.   3.  Hypertension -blood

## 2021-05-04 NOTE — TELEPHONE ENCOUNTER
Cintia Olmos is being discharged from the hospital today. He was admitted for chest pain.  He has a follow up appointment scheduled for 5/11/21    Health Maintenance   Topic Date Due    HIV screen  Never done    Hepatitis B vaccine (1 of 3 - Risk Recombivax 3-dose series) Never done    DTaP/Tdap/Td vaccine (1 - Tdap) Never done    Lipid screen  02/09/2020    COVID-19 Vaccine (2 - Moderna 2-dose series) 04/06/2021    Flu vaccine (Season Ended) 09/01/2021    Pneumococcal 0-64 years Vaccine  Completed    Hepatitis C screen  Completed    Hepatitis A vaccine  Aged Out    Hib vaccine  Aged Out    Meningococcal (ACWY) vaccine  Aged Out             (applicable per patient's age: Cancer Screenings, Depression Screening, Fall Risk Screening, Immunizations)    Hemoglobin A1C (%)   Date Value   09/20/2016 4.6 (L)     LDL Cholesterol (mg/dL)   Date Value   02/09/2019 58     AST (U/L)   Date Value   05/03/2021 15     ALT (U/L)   Date Value   05/03/2021 12     BUN (mg/dL)   Date Value   05/03/2021 33 (H)      (goal A1C is < 7)   (goal LDL is <100) need 30-50% reduction from baseline     BP Readings from Last 3 Encounters:   05/04/21 (!) 146/81   03/16/21 108/60   01/14/21 (!) 162/86    (goal /80)      All Future Testing planned in [facility]:  Lab Frequency Next Occurrence   Intake and output EVERY 8 HOURS    Initiate Oxygen Therapy Protocol DAILY (RT)    EKG 12 lead PRN    Up with assistance PRN    Diet NPO, After Midnight DIET EFFECTIVE MIDNIGHT    Nasal Cannula Oxygen PRN        Next Visit Date:  Future Appointments   Date Time Provider Christopher Conti   5/11/2021  1:20 PM DO Migel Rios W   9/14/2021  3:00 PM [facility]            Patient Active Problem List:     ESRD (end stage renal disease) on dialysis ([facility] [address]     Uncontrolled hypertension     Hypertriglyceridemia     Vitamin D deficiency     Arthritis, degenerative     Chronic glomerulonephritis with pathological lesion in kidney     Secondary hyperparathyroidism ([facility] [address]     Polycystic kidney disease     Other chest pain     Chest pain

## 2021-05-04 NOTE — PROGRESS NOTES
Quality flow rounds held on 5/4/21     Yamil Lyn is admitted for  Other chest pain. Length of stay 0. Education:    Needed Education: chest pain, meds, follow up,diet      Do you have any questions regarding your plan of care while at the hospital? denies    Planned Disposition:               [x]  Home when able                [] Swing Bed                [] ECF/SNF               [] Other/TBD    Barriers to Discharge:    Can you afford your medications? Yes-receives assistance through the dialysis center   Do you have transportation to follow up appointments? Drives self   Do you need any new equipment at home? none   Current equipment includes   none    Do you have a living will or durable power of  for healthcare? denies               If yes do we have a copy on file? n/a    Do you or your family have any questions or concerns we haven't already discussed? Denies    Lives with wife and children. receives dialysis on Mondays Wednesdays and Fridays. Requesting his f/u appt with Dr Iban Castillo be made for the afternoon, he works nights. Denies needs at discharge.  ID is [id number], Liliana Eduardo. Naz Belcher and writer present during rounding.

## 2021-05-04 NOTE — PROGRESS NOTES
Discharge instructions given to patient through  [id number]. Pt denies any questions at this time. Family in that speak [address] discharge instructions given to them, with no further questions. Pt leaves per wheelchair to private car in stable condition.

## 2021-05-04 NOTE — PROGRESS NOTES
Interpretor # [id number] Maddy Lewis via tablet used during Stress test.  11:48 Lexiscan administered, pt reports \"feel tired\" \"little shortness of breath\"  11:55 Pt reports he is feeling better. Pt tolerated Lexiscan well. Pt given beverage and snack.

## 2021-05-04 NOTE — DISCHARGE SUMMARY
Hospitalist Discharge Summary    Patient:  Yamil Lyn  YOB: 1975    MRN: [id number]   Acct: [de-identified]    Primary Care Physician: Rosa Mendoza DO    Admit date:  5/3/2021    Discharge date:  5/4/2021       Discharge Diagnoses:     1. Chest pain  2. ESRD, on HD MWF  3. Hypertension  4. Hyperlipidemia  5. PKD with glomerulopathy  6. Secondary hyperparathyroidism  7. History COVID-19 infection with pneumonia      Discharge Medications:       Leann Parker   Home Medication Instructions RJI:[id number]    Printed on:05/04/21 1413   Medication Information                      acetaminophen (TYLENOL) 325 MG tablet  Take 650 mg by mouth every 6 hours as needed for Pain             amLODIPine (NORVASC) 10 MG tablet  TAKE 1 TABLET BY MOUTH EVERY DAY             ergocalciferol (ERGOCALCIFEROL) 1.25 MG (84126 UT) capsule  TAKE 1 CAPSULE BY MOUTH EVERY 2 WEEKS             Etelcalcetide HCl (PARSABIV IV)  5 mg             hydrALAZINE (APRESOLINE) 50 MG tablet  Take 1 tablet by mouth 2 times daily             lidocaine-prilocaine (EMLA) 2.5-2.5 % cream  APPLY SMALL AMOUNT TO ACCESS SITE (AVF) 1 HOUR BEFORE DIALYSIS.  COVER WITH OCCLUSIVE DRESSING (SARAN WRAP)             Melatonin 3 MG CAPS  Take by mouth             metoprolol succinate (TOPROL XL) 100 MG extended release tablet  TAKE 1 TABLET BY MOUTH EVERY DAY             sevelamer (RENVELA) 800 MG tablet  Take 1 tablet by mouth 3 times daily (with meals)             Sucroferric Oxyhydroxide (VELPHORO) 500 MG CHEW  Take 2 tablets by mouth                 Diet: Renal diet    Activity: Resume prehospital activities    Follow-up:   with Rosa Mendoza DO    Consultants: Cardiology Dr. Moose Smalls        CBC:   Recent Labs     05/04/21  0510   WBC 6.6   HGB 12.9*        BMP:    Recent Labs     05/03/21  1210      K 4.0   CL 96*   CO2 28   BUN 33*   CREATININE 7.08*   GLUCOSE 97     Calcium:  Recent Labs     05/03/21  1210 CALCIUM 9.1     Physical Exam:    Vitals:  Patient Vitals for the past 24 hrs:   BP Temp Temp src Pulse Resp SpO2 Height Weight   05/04/21 1156 (!) 162/97 -- -- 68 -- -- -- --   05/04/21 1155 (!) 161/100 -- -- 66 -- -- -- --   05/04/21 1154 (!) 162/94 -- -- 68 -- -- -- --   05/04/21 1153 (!) 159/95 -- -- 68 -- -- -- --   05/04/21 1152 (!) 158/96 -- -- 71 -- -- -- --   05/04/21 1151 (!) 153/92 -- -- 76 -- -- -- --   05/04/21 1148 (!) 143/90 -- -- 52 -- -- -- --   05/04/21 1144 -- -- -- 55 -- -- -- --   05/04/21 0750 (!) 146/81 98.3 °F (36.8 °C) Oral 54 16 99 % -- --   05/04/21 0628 -- -- -- -- -- 99 % -- --   05/04/21 0448 (!) 141/81 97.8 °F (36.6 °C) Oral 53 16 99 % -- 161 lb (73 kg)   05/03/21 2350 138/77 98.3 °F (36.8 °C) Oral 55 16 100 % -- --   05/03/21 2158 -- -- -- -- -- 96 % -- --   05/03/21 1945 (!) 150/81 98.4 °F (36.9 °C) Oral 54 16 96 % -- --   05/03/21 1900 -- -- -- -- -- 98 % -- --   05/03/21 1603 (!) 149/83 98 °F (36.7 °C) Oral 58 16 99 % 5' 4\" (1.626 m) 160 lb 11.2 oz (72.9 kg)   05/03/21 1504 (!) 146/81 -- -- 56 15 98 % -- --   05/03/21 1434 (!) 146/84 -- -- 56 22 98 % -- --     Weight: Weight: 161 lb (73 kg)     24 hour intake/output:    Intake/Output Summary (Last 24 hours) at 5/4/2021 1413  Last data filed at 5/3/2021 2350  Gross per 24 hour   Intake 500 ml   Output --   Net 500 ml     General Appearance: alert and oriented to person, place and time, in no acute distress  Cardiovascular: normal rate, regular rhythm, normal S1 and S2, no murmurs, rubs, clicks, or gallops, distal pulses intact  Pulmonary/Chest: clear to auscultation bilaterally- no wheezes, rales or rhonchi, normal air movement, no respiratory distress  Abdomen: soft, non-tender, non-distended, normal bowel sounds  Extremities: no cyanosis, clubbing or edema  Skin: warm and dry, no rash or erythema  Head: normocephalic and atraumatic  Eyes: pupils equal, round, and reactive to light  Neck: supple and non-tender without mass, no

## 2021-05-04 NOTE — PROGRESS NOTES
SW met with pt to complete assessment during quality rounds with RN case manager.  pad used to communicate with [facility] [place] as  #[id number]. Pt is alert and oriented and pleasant. Pt is a 41year old  male admitted for chest pain, that occurred during dialysis yesterday. Pt attends the dialysis center in [facility] on Monday Wednesday and Friday and has been on dialysis since 2006. Pt does not use any DME at home. Pt lives with his wife and children in their home in [facility]. Pt drives and is able to get himself to appointments as needed. Pt works full time at [employer]. Pt is a full code and follows with Dr Keya Mustafa as PCP. Pt does not have advance directives at this time. ACP note completed with pt. Pt reports that he gets assistance at the dialysis center to help with costs of some of his medications. Pt is scheduled for testing this afternoon and then plans to return home with his family. Pt states that family will transport him home. Pt identifies no discharge needs or concerns at this time. SW will remain available as needed.  Callum English MSW LSW 5/4/2021

## 2021-05-04 NOTE — PROGRESS NOTES
Cardiology    He is doing well with no further chest pain. Enzymes were normal.  No echo available at this time, however, LV normal function by bedside echo last night. I reviewed the Myoview CST and I do not see any significant area of ischemia. Would not do further workup based on his stress test.    From my viewpoint may go home and follow up with Dr. Keely Foreman. No change in medication at this time. I will see as needed. He appreciated the care of Dr. Jacob Fong (and thinks she is almost as good a Dr. Keely Foreman, which is the ultimate compliment. ..)    Thanks, Ian Webster MD

## 2021-05-04 NOTE — ACP (ADVANCE CARE PLANNING)
Advance Care Planning     Advance Care Planning Activator (Inpatient)  Conversation Note      Date of ACP Conversation: 5/4/2021     Conversation Conducted with: Patient with Decision Making Capacity    ACP Activator: [address] [place] [address] Drive Decision Maker:     Current Designated Health Care Decision Maker:     Primary Decision Maker: Zechariaharpantoby RobHerbert - [phone]  Click here to complete Healthcare Decision Makers including section of the Healthcare Decision Maker Relationship (ie \"Primary\")  Today we documented Decision Maker(s) consistent with Legal Next of Kin hierarchy. Care Preferences    Ventilation: \"If you were in your present state of health and suddenly became very ill and were unable to breathe on your own, what would your preference be about the use of a ventilator (breathing machine) if it were available to you? \"      Would the patient desire the use of ventilator (breathing machine)?: yes    \"If your health worsens and it becomes clear that your chance of recovery is unlikely, what would your preference be about the use of a ventilator (breathing machine) if it were available to you? \"     Would the patient desire the use of ventilator (breathing machine)?: No      Resuscitation  \"CPR works best to restart the heart when there is a sudden event, like a heart attack, in someone who is otherwise healthy. Unfortunately, CPR does not typically restart the heart for people who have serious health conditions or who are very sick. \"    \"In the event your heart stopped as a result of an underlying serious health condition, would you want attempts to be made to restart your heart (answer \"yes\" for attempt to resuscitate) or would you prefer a natural death (answer \"no\" for do not attempt to resuscitate)? \" yes       [] Yes   [x] No   Educated Patient / Gurinder Palacios regarding differences between Advance Directives and portable DNR orders.     Length of ACP Conversation in minutes: 5  Conversation Outcomes:  [x] ACP discussion completed  [] Existing advance directive reviewed with patient; no changes to patient's previously recorded wishes  [] New Advance Directive completed  [] Portable Do Not Rescitate prepared for Provider review and signature  [] POLST/POST/MOLST/MOST prepared for Provider review and signature      Follow-up plan:    [] Schedule follow-up conversation to continue planning  [] Referred individual to Provider for additional questions/concerns   [] Advised patient/agent/surrogate to review completed ACP document and update if needed with changes in condition, patient preferences or care setting    [x] This note routed to one or more involved healthcare providers

## 2021-05-04 NOTE — PLAN OF CARE
Problem: SAFETY  Goal: Free from accidental physical injury  5/4/2021 1642 by Kasey Alvarez RN  Outcome: Ongoing  5/4/2021 1116 by Kasey Alvarez RN  Outcome: Ongoing  Goal: Free from intentional harm  5/4/2021 1642 by Kasey Alvarez RN  Outcome: Ongoing  5/4/2021 1116 by Kasey Alvarez RN  Outcome: Ongoing     Problem: DAILY CARE  Goal: Daily care needs are met  5/4/2021 1642 by Kasey Alvarez RN  Outcome: Ongoing  5/4/2021 1116 by Kasey Alvarez RN  Outcome: Ongoing     Problem: PAIN  Goal: Patient's pain/discomfort is manageable  5/4/2021 1642 by Kasey Alvarez RN  Outcome: Ongoing  5/4/2021 1116 by Kasey Alvarez RN  Outcome: Ongoing     Problem: SKIN INTEGRITY  Goal: Skin integrity is maintained or improved  Outcome: Ongoing     Problem: KNOWLEDGE DEFICIT  Goal: Patient/S.O. demonstrates understanding of disease process, treatment plan, medications, and discharge instructions.   Outcome: Ongoing     Problem: DISCHARGE BARRIERS  Goal: Patient's continuum of care needs are met  Outcome: Ongoing     Problem: Cardiac:  Goal: Ability to maintain an adequate cardiac output will improve  Description: Ability to maintain an adequate cardiac output will improve  5/4/2021 1642 by Kasey Alvarez RN  Outcome: Ongoing  5/4/2021 1116 by Kasey Alvarez RN  Outcome: Ongoing     Problem: Fluid Volume:  Goal: Ability to achieve and maintain adequate urine output will improve  Description: Ability to achieve and maintain adequate urine output will improve  Outcome: Ongoing     Problem: Respiratory:  Goal: Respiratory status will improve  Description: Respiratory status will improve  Outcome: Ongoing

## 2021-05-04 NOTE — PROGRESS NOTES
Luanne Hunter with  Number [id number] used for assessment and questionnaire for patient at bedside, RN and Dr. Merced Barajas present.

## 2021-05-04 NOTE — PROGRESS NOTES
Pt returns from stress test.  Denies any complaints of pain or dyspnea at this time. Call light in reach.

## 2021-05-04 NOTE — PLAN OF CARE
Problem: SAFETY  Goal: Free from accidental physical injury  5/3/2021 2358 by Maeve Ga RN  Outcome: Ongoing  5/3/2021 1614 by Leon Rizo RN  Outcome: Met This Shift  Goal: Free from intentional harm  Outcome: Ongoing     Problem: DAILY CARE  Goal: Daily care needs are met  5/3/2021 2358 by Maeve Ga RN  Outcome: Ongoing  5/3/2021 1614 by Leon Rizo RN  Outcome: Met This Shift     Problem: PAIN  Goal: Patient's pain/discomfort is manageable  Outcome: Ongoing     Problem: SKIN INTEGRITY  Goal: Skin integrity is maintained or improved  Outcome: Ongoing     Problem: KNOWLEDGE DEFICIT  Goal: Patient/S.O. demonstrates understanding of disease process, treatment plan, medications, and discharge instructions.   Outcome: Ongoing     Problem: DISCHARGE BARRIERS  Goal: Patient's continuum of care needs are met  Outcome: Ongoing     Problem: Cardiac:  Goal: Ability to maintain an adequate cardiac output will improve  Description: Ability to maintain an adequate cardiac output will improve  Outcome: Ongoing  Goal: Hemodynamic stability will improve  Description: Hemodynamic stability will improve  Outcome: Ongoing     Problem: Fluid Volume:  Goal: Ability to achieve and maintain adequate urine output will improve  Description: Ability to achieve and maintain adequate urine output will improve  Outcome: Ongoing     Problem: Respiratory:  Goal: Respiratory status will improve  Description: Respiratory status will improve  Outcome: Ongoing

## 2021-05-04 NOTE — CONSULTS
[place] [address]                                  CONSULTATION    PATIENT NAME: Dwayne Cuba               :        1975  MED REC NO:   [id number]                              ROOM:       [id number]  ACCOUNT NO:   [de-identified]                           ADMIT DATE: 2021  PROVIDER:     [facility]    CONSULT DATE:  2021    CHIEF COMPLAINT:  Chest pain. HISTORY OF PRESENT ILLNESS:  The patient is a pleasant 80-year-old  [place] American gentleman who speaks limited [place]. His friend from  the [employer] was the  and did an excellent job. He has never had a cardiac issue in the past.  He does have chronic  renal insufficiency and has been on dialysis since . It is treated  under the care of Dr. Craig Deng. Gets dialysis 3 days a week. He has  end-stage renal disease secondary to glomerulopathy and also polycystic  kidney disease. He has dialysis on Monday, Wednesday and Friday. He  has been on dialysis since 2006. He is normally very active. He has a full-time job at [employer]. He is also active at home. He has never had any cardiac  issue in the past.    He did have COVID on , and was hospitalized in [place] from  , to . He was discharged. He still does not feel he is quite  back to his usual health following his discharge. He has slightly more  shortness of breath since his discharge and energy level is slightly  lower. He has noted that during dialysis he has been developing some chest  heaviness or tightness. It usually will subside after dialysis is  complete and he does not receive any specific therapy. It has not  particularly bothered him to have his mild chest heaviness. Yesterday during dialysis, he had more significant chest pain. It  subsided when dialysis was complete.   It was associated with mild  nausea, but he had no vomiting. He had no diaphoresis. Because of his  more severe chest discomfort and heaviness and tightness, he came to the  emergency room. He was having no chest pain in the emergency room. His  high-sensitivity troponin was 44 on admission, and 2 hours later, it was  41. Because of his risk factors and because of his chest heaviness, he  was admitted. I saw him in the emergency room. He was having no discomfort in the emergency room. He denies any  exertional chest pain or chest discomfort. Energy level has been fair  and gradually improving since his COVID in December. He has had no  unusual shortness of breath. Denies any palpitations. No syncope or  near syncope. He has had no PND, orthopnea or pedal edema. He has received some distressing news concerning his daughter and a  possible brain tumor. It obviously is very upsetting. He has not slept  much over the last several days. He has had no cardiac testing, such as stress test or echocardiogram.   EKG did not show any acute changes, he had sinus bradycardia with  nonspecific ST changes. It was unchanged from 12/26, when he came to  the emergency room with his COVID. He is followed by Dr. Dimple De Jesus (who he feels is the best doctor ever. ..). CARDIAC RISK FACTORS:  Other Family Members:  Negative. Peripheral Vascular Disease:  Negative. Hypertension:  Positive. Hyperlipidemia:  Positive. Smoking:  Negative. Diabetes:  Negative. MEDICATIONS PRIOR TO ADMISSION:  He was on Toprol- mg daily,  Norvasc 10 mg daily, vitamin D 50,000 units weekly, hydralazine 50 mg  b.i.d., Renvela 800 mg t.i.d. PAST MEDICAL AND SURGICAL HISTORY:  1. He had a fistula put in in 2007, which is working normally. 2.  He has had thyroid lobectomy in 2012. 3.  He does have rheumatoid arthritis and does have pain in his knees  when he ambulates.     FAMILY HISTORY:  No significant heart disease in his family. SOCIAL HISTORY:  He is 41years old. . He works at [employer], full-time. He does not smoke. Does not drink alcohol. He has 3 children, consisting of 1 girl and 2 boys. He was just  informed that his daughter may have a brain tumor. REVIEW OF SYSTEMS:  Cardiac as above. Other systems reviewed including  constitutional, eyes, ears, nose and throat, cardiovascular,  respiratory, GI, , musculoskeletal, integumentary, neurologic,  endocrine, hematologic and allergic/immunologic are negative except for  what is described above. No weight loss or weight gain. No change in  bowel habits. No blood in the stool. No fevers, sweats or chills. He  has had no exertional chest pain or chest discomfort. Energy level has  been good. Again, he did have COVID in December and does not feel his  energy level is quite back to his baseline. PHYSICAL EXAMINATION:  VITAL SIGNS:  His blood pressure was 138/77 with a heart rate of 54 and  regular. Respiratory rate 18. O2 sat was 100% on room air. GENERAL:  He is a very pleasant 81-year-old gentleman. Denied pain. He  was oriented to person, place and time. Answered questions  appropriately. His  was a Orthodox friend who did an excellent  job. SKIN:  No unusual skin changes. HEENT:  The pupils are equally round and intact. Mucous membranes were  dry. NECK:  No JVD. Good carotid pulses. No carotid bruits. No  lymphadenopathy or thyromegaly. CARDIOVASCULAR EXAM:  S1 and S2 were normal.  No S3 or S4. Soft  systolic blowing type murmur. No diastolic murmur. PMI was normal.  No  lift, thrust, or pericardial friction rub. LUNGS:  Fairly clear to auscultation and percussion. ABDOMEN:  Soft and nontender. Good bowel sounds. The aorta was not  enlarged. No hepatomegaly, splenomegaly. EXTREMITIES:  Good femoral pulses. Good pedal pulses. He had no pedal  edema. Skin was warm and dry. No calf tenderness.   Nail beds pink. Good cap refill. PULSES:  Bilateral symmetrical radial, brachial and carotid pulses. No  carotid bruits. Good femoral and pedal pulses. NEUROLOGIC EXAM:  Within normal limits. PSYCHIATRIC EXAM:  Within normal limits. LABORATORY DATA:  His sodium was 140, potassium 4.0, BUN was 33,  creatinine 7.08, his glucose was 97, calcium 9.1. Troponin was 44, his  repeat troponin was 41 and 40. ALT was 12, AST was 15. White count was  7.3, hemoglobin 14.4 with platelet count of 645,709. EKG showed sinus bradycardia with nonspecific ST changes. Chest x-ray was unremarkable. I did a bedside echocardiogram in the emergency room that showed normal  LV size and function, with ejection fraction of 60%. There were no  obvious valve abnormalities. I felt his right-sided chambers were  generous in size and I question he has underlying sleep apnea. IMPRESSION:  1. Chest pain somewhat atypical as it is occurring primarily only  during dialysis, with yesterday being the most severe than he has had,  with no history of exertional chest pain. 2.  Polycystic kidney disease with glomerulopathy, on dialysis since  2006, under the care of Dr. Katty Jimenez, having dialysis on Monday,  Wednesday and Friday. 3.  COVID infection on 12/26/2020, from which he feels he has not made a  full recovery. 4.  Hypertension. 5.  Hyperlipidemia. 6.  Arthritis. 7.  Status post thyroid lobectomy in 2012. 8.  Normal LV function, with generous right-sided chambers and question  whether he has sleep apnea. PLAN:  1. Lexiscan Cardiolite stress test in the morning. 2.  If this is unremarkable, he would be discharged. 3.  Echocardiogram is not available tomorrow because of illness, which  is why I did a bedside echocardiogram in the emergency room. DISCUSSION:  The patient has been having chest pain, but it has been  primarily occurring with dialysis. He describes it as a pressure.   His  chest pain on the day of admission was more severe than it had been  previously and associated with nausea but no vomiting. He had no  diaphoresis and no unusual shortness of breath. He denies any exertional chest pain or chest discomfort. His energy  level has been good although ever since his COVID in December, not quite  back to its baseline. He has had no unusual shortness of breath or  dyspnea or exertion. He has also just received a disturbing news that his daughter may have a  brain malignancy. Therefore, he has been fairly upset and unable to  sleep for the last several days as a result. I doubt that this is cardiac in etiology. He is able to perform his  usual activities without difficulty and has no exertional chest pain. I  feel that this pain is most likely noncardiac. Therefore, it is  reasonable to keep him here in our facility and do a Lexiscan Cardiolite  stress test in the morning. He is stable at this time hemodynamically. His cardiac enzymes are detectable, but they have not changed with  repeat enzymes. Thank you very much for allowing me the privilege of seeing the patient. If you have any questions on my thoughts, please do not hesitate to  contact me.         Ulisses Fontenot    D: 05/04/2021 4:16:03       T: 05/04/2021 9:01:00     CINTHIA/[email or web address]  Job#: [id number]     Doc#: [id number]    CC:  Raleigh Marie DO

## 2021-05-05 ENCOUNTER — TELEPHONE (OUTPATIENT)
Dept: CARDIOLOGY CLINIC | Age: 46
End: 2021-05-05

## 2021-05-05 LAB
EKG ATRIAL RATE: 51 BPM
EKG P AXIS: 19 DEGREES
EKG P-R INTERVAL: 166 MS
EKG Q-T INTERVAL: 464 MS
EKG QRS DURATION: 104 MS
EKG QTC CALCULATION (BAZETT): 427 MS
EKG R AXIS: 22 DEGREES
EKG T AXIS: 54 DEGREES
EKG VENTRICULAR RATE: 51 BPM

## 2021-05-05 PROCEDURE — 93010 ELECTROCARDIOGRAM REPORT: CPT | Performed by: INTERNAL MEDICINE

## 2021-05-05 NOTE — TELEPHONE ENCOUNTER
Alycia Hay called from Radiology called to give Dr. Sincere Mcclellan a message. She stated that the Echo in [address] chart is marked complete, but he did not have one done. If Dr. Sincere Mcclellan does want him to have an Echo, he would need to put in a new order and then be scheduled as an outpatient. Please advise. ..

## 2021-05-05 NOTE — TELEPHONE ENCOUNTER
[facility] Initial Follow Up Call    Outreach made within 2 business days of discharge: Yes    Patient: Eliza Agosto Patient : 1975   MRN: [id number]  Reason for Admission: chest pain  Discharge Date: 21       Spoke with: [facility]-#[id number],     Discharge department/facility: [facility]    Per  no answer, no vm    Scheduled appointment with PCP within 7-14 days    Follow Up  Future Appointments   Date Time Provider Christopher Conti   2021  1:20 PM DO Migel Em W   2021  3:00 PM [address] [place] [address] , [address] [facility]

## 2021-05-05 NOTE — PROCEDURES
[place] [address]                              CARDIAC STRESS TEST    PATIENT NAME: Carrol Vealzquez               :        1975  MED REC NO:   [id number]                              ROOM:       5716  ACCOUNT NO:   [de-identified]                           ADMIT DATE: 2021  PROVIDER:     Conor Boothe      DATE OF STUDY:  2021    LEXISCAN CARDIOLITE STRESS TEST:    INDICATION:  Chest pain. IMPRESSION:  1. We gave 0.4 mg of Lexiscan intravenously. 2.  This was followed in 20 seconds by Cardiolite infusion. 3.  There was no chest pain. 4.  There was no ST depression. 5.  It was an overall negative Lexiscan stress test.  6.  Cardiolite to follow.         Orlin Pardo    D: 2021 5:15:06       T: 2021 5:36:30     [email or web address]  Job#: [id number]     Doc#: [id number]    CC:  Kostas Childs DO

## 2021-05-05 NOTE — PROCEDURES
[place] [address]                              CARDIAC STRESS TEST    PATIENT NAME: Ingrid Holland               :        1975  MED REC NO:   [id number]                              ROOM:       6502  ACCOUNT NO:   [de-identified]                           ADMIT DATE: 2021  PROVIDER:     Scottie Salomon      DATE OF STUDY:  2021    Cardiovascular Diagnostics Department    Ordering Provider:  Sergio Avila MD    Primary Care Provider:  Vanessa Patterson Lockie Matter    Interpreting Physician:  Chyna Garcia MD    MYOCARDIAL PERFUSION STRESS IMAGING    The stress ECG results are reported separately. NUCLEAR IMAGING RESULTS:  The overall quality of the study is fair. No  significant attenuation artifact was seen. There is no evidence of  abnormal lung uptake. Additionally, the right ventricle appears normal.  The left ventricular cavity is noted to be normal in size on stress  images. There is no evidence of transient ischemic dilatation (TID) of  the left ventricle. Gated SPECT imaging reveals normal myocardial thickening and wall motion  with a calculated left ventricular ejection fraction (EF) of 60%. The rest images demonstrate homogenous tracer distribution throughout  the myocardium. SPECT images demonstrate homogenous tracer distribution throughout the  myocardium. IMPRESSION:  1. Normal myocardial perfusion imaging without evidence of significant  myocardial ischemia or infarction. 2.  Global left ventricular systolic function was normal with an EF of  60% without regional wall motion abnormalities. Overall these results are most consistent with a low risk for  significant coronary artery disease. The results of this test were discussed with Dr. Chandana Viveros on 2021  at [phone]. SHANTA SALOMON    D: 2021 8:48:22       T: 2021 8:49:26     [email or web address]  Job#: [id number]     Doc#: Unknown    CC:  Dariusz Wang DO

## 2021-05-11 ENCOUNTER — OFFICE VISIT (OUTPATIENT)
Dept: FAMILY MEDICINE CLINIC | Age: 46
End: 2021-05-11
Payer: MEDICARE

## 2021-05-11 VITALS
BODY MASS INDEX: 27.49 KG/M2 | HEIGHT: 64 IN | SYSTOLIC BLOOD PRESSURE: 118 MMHG | OXYGEN SATURATION: 99 % | HEART RATE: 61 BPM | DIASTOLIC BLOOD PRESSURE: 68 MMHG | WEIGHT: 161 LBS

## 2021-05-11 DIAGNOSIS — Z09 HOSPITAL DISCHARGE FOLLOW-UP: ICD-10-CM

## 2021-05-11 DIAGNOSIS — M79.605 BILATERAL LEG PAIN: ICD-10-CM

## 2021-05-11 DIAGNOSIS — M79.604 BILATERAL LEG PAIN: ICD-10-CM

## 2021-05-11 DIAGNOSIS — I10 ESSENTIAL HYPERTENSION: ICD-10-CM

## 2021-05-11 DIAGNOSIS — R07.89 ATYPICAL CHEST PAIN: Primary | ICD-10-CM

## 2021-05-11 PROCEDURE — 99214 OFFICE O/P EST MOD 30 MIN: CPT | Performed by: FAMILY MEDICINE

## 2021-05-11 NOTE — PATIENT INSTRUCTIONS
SURVEY:    You may be receiving a survey from [employer] regarding your visit today. You may get this in the mail, through your MyChart or in your email. Please complete the survey to enable us to provide the highest quality of care to you and your family. If you cannot score us as very good ( 5 Stars) on any question, please feel free to call the office to discuss how we could have made your experience exceptional.     Thank you.     Clinical Care Team:  Dr. Ab Stephenson, DO Corinna Jacobsen, [address] [place] [address] Team:  [address] [place] [address]

## 2021-05-11 NOTE — PROGRESS NOTES
Name: Keshav Hernandez  : 1975         Chief Complaint:     Chief Complaint   Patient presents with    Hypertension       History of Present Illness:      Keshav Hernandez is a 41 y.o.  male who presents with Hypertension      HPI     Patient was admitted to [facility] from 5/3 to  for chest pain. Initial post-discharge communication attempted x2 on  - see documentation in chart: telephone encounter. Diagnostic test results reviewed: inpatient labs and stress test-lexiscan    Patient risk of morbidity and mortality: moderate    Medical Decision Making: moderate complexity      Pt interviewed with services of telephone  #[phone]. Has been feeling fine since d/c. Prior to presentation to hospital, he had been at HD and about an hr before he finished was having feeling of pressure in his chest, back pain, nausea and upset stomach, felt like he needed to have BM. By the time HD was done he felt a lot better but staff still advised him to go to the hospital. Some abnl labs in ER and was admitted. Next day stress test and was told all ok. Prior to onset of CP pt had been awake for >24h and was also concerned about dtr, had been having bad headaches and had brought her to ER the previous day. No recurrence of pain, has been feeling ok. HTN - readings vary, thinks some r/t work schedule. Compliant with meds. C/o pain taylor lower extremities, knees to soles of feet. After breaks at work he starts having some discomfort but then it gets better with time. Stands at work. Wears good shoes, [employer]chers tennis shoes, and changes them (gets new pair) frequently.      Past Medical History:     Past Medical History:   Diagnosis Date    End-stage renal disease on hemodialysis ([facility] [address]     since , due to polycystic kidney disease    Hypertension     Hypertriglyceridemia         Past Surgical History:     Past Surgical History:   Procedure Laterality Date    DIALYSIS FISTULA CREATION      [facility]  2012    [facility]        Medications:       Prior to Admission medications    Medication Sig Start Date End Date Taking? Authorizing Provider   Sucroferric Oxyhydroxide (VELPHORO) 500 MG CHEW Take 2 tablets by mouth 2/12/21  Yes Historical Provider, MD   Melatonin 3 MG CAPS Take by mouth 2/10/21  Yes Historical Provider, MD   Etelcalcetide HCl (PARSABIV IV) 5 mg 7/17/20 7/16/21 Yes Historical Provider, MD   metoprolol succinate (TOPROL XL) 100 MG extended release tablet TAKE 1 TABLET BY MOUTH EVERY DAY 3/16/21  Yes Rosa IrelandineDO   amLODIPine (NORVASC) 10 MG tablet TAKE 1 TABLET BY MOUTH EVERY DAY 3/16/21  Yes Rosa Mendoza DO   lidocaine-prilocaine (EMLA) 2.5-2.5 % cream APPLY SMALL AMOUNT TO ACCESS SITE (AVF) 1 HOUR BEFORE DIALYSIS. COVER WITH OCCLUSIVE DRESSING (SARAN WRAP) 11/30/20  Yes Historical Provider, MD   ergocalciferol (ERGOCALCIFEROL) 1.25 MG (27357 UT) capsule TAKE 1 CAPSULE BY MOUTH EVERY 2 WEEKS 11/30/20  Yes Historical Provider, MD   acetaminophen (TYLENOL) 325 MG tablet Take 650 mg by mouth every 6 hours as needed for Pain   Yes Historical Provider, MD   hydrALAZINE (APRESOLINE) 50 MG tablet Take 1 tablet by mouth 2 times daily 11/19/19  Yes Rosa Mendoza DO   sevelamer (RENVELA) 800 MG tablet Take 1 tablet by mouth 3 times daily (with meals)   Yes Historical Provider, MD        Allergies:       Sensipar  [cinacalcet]    Social History:     Tobacco:    reports that he has never smoked. He has never used smokeless tobacco.  Alcohol:      reports no history of alcohol use. Drug Use:  reports no history of drug use. Family History:     No family history on file. Review of Systems:     Positive and Negative as described in HPI    Review of Systems    Physical Exam:     Vitals:  /68   Pulse 61   Ht 5' 4\" (1.626 m)   Wt 161 lb (73 kg)   SpO2 99%   BMI 27.64 kg/m²   Physical Exam  Constitutional:       Appearance: Normal appearance.  He provide the highest quality of care to you and your family. If you cannot score us as very good ( 5 Stars) on any question, please feel free to call the office to discuss how we could have made your experience exceptional.     Thank you. Clinical Care Team:  DO Corinna Guevara, [facility] Team:  Simon Breaux received counseling on the following healthy behaviors: medication adherence  Reviewed prior labs and health maintenance. Continue current medications, diet and exercise. Discussed use, benefit, and side effects of prescribed medications. Barriers to medication compliance addressed. Patient given educational materials - see patient instructions. All patient questions answered. Patient voiced understanding.      Electronically signed by Ab Stephenson DO on 5/13/2021 at 10:27 PM   [address]  [address] [place] [id number]  Dept: [phone]

## 2021-05-12 PROBLEM — R07.9 CHEST PAIN: Status: RESOLVED | Noted: 2021-05-03 | Resolved: 2021-05-12

## 2021-05-12 PROBLEM — R07.89 OTHER CHEST PAIN: Status: RESOLVED | Noted: 2021-05-03 | Resolved: 2021-05-12

## 2021-08-24 ENCOUNTER — HOSPITAL ENCOUNTER (OUTPATIENT)
Age: 46
Discharge: HOME | End: 2021-08-24
Payer: MEDICARE

## 2021-08-24 ENCOUNTER — TELEPHONE (OUTPATIENT)
Dept: FAMILY MEDICINE CLINIC | Age: 46
End: 2021-08-24

## 2021-08-24 ENCOUNTER — OFFICE VISIT (OUTPATIENT)
Dept: FAMILY MEDICINE CLINIC | Age: 46
End: 2021-08-24
Payer: MEDICARE

## 2021-08-24 VITALS
HEIGHT: 64 IN | BODY MASS INDEX: 27.42 KG/M2 | DIASTOLIC BLOOD PRESSURE: 66 MMHG | HEART RATE: 64 BPM | SYSTOLIC BLOOD PRESSURE: 104 MMHG | WEIGHT: 160.6 LBS

## 2021-08-24 DIAGNOSIS — R53.83 FATIGUE, UNSPECIFIED TYPE: Primary | ICD-10-CM

## 2021-08-24 DIAGNOSIS — N18.6 ESRD (END STAGE RENAL DISEASE) ON DIALYSIS (HCC): ICD-10-CM

## 2021-08-24 DIAGNOSIS — Z99.2 ESRD (END STAGE RENAL DISEASE) ON DIALYSIS (HCC): ICD-10-CM

## 2021-08-24 DIAGNOSIS — M89.8X9 BONE PAIN: ICD-10-CM

## 2021-08-24 DIAGNOSIS — R53.83 FATIGUE, UNSPECIFIED TYPE: ICD-10-CM

## 2021-08-24 LAB
ABSOLUTE EOS #: 0.1 K/UL (ref 0–0.4)
ABSOLUTE IMMATURE GRANULOCYTE: NORMAL K/UL (ref 0–0.3)
ABSOLUTE LYMPH #: 1.3 K/UL (ref 1–4.8)
ABSOLUTE MONO #: 0.7 K/UL (ref 0–1)
ANION GAP SERPL CALCULATED.3IONS-SCNC: 17 MMOL/L (ref 9–17)
BASOPHILS # BLD: 1 % (ref 0–2)
BASOPHILS ABSOLUTE: 0 K/UL (ref 0–0.2)
BUN BLDV-MCNC: 55 MG/DL (ref 6–20)
BUN/CREAT BLD: 5 (ref 9–20)
CALCIUM SERPL-MCNC: 8.5 MG/DL (ref 8.6–10.4)
CHLORIDE BLD-SCNC: 91 MMOL/L (ref 98–107)
CO2: 26 MMOL/L (ref 20–31)
CREAT SERPL-MCNC: 10.94 MG/DL (ref 0.7–1.2)
DIFFERENTIAL TYPE: YES
EOSINOPHILS RELATIVE PERCENT: 1 % (ref 0–5)
GFR AFRICAN AMERICAN: 6 ML/MIN
GFR NON-AFRICAN AMERICAN: 5 ML/MIN
GFR SERPL CREATININE-BSD FRML MDRD: ABNORMAL ML/MIN/{1.73_M2}
GFR SERPL CREATININE-BSD FRML MDRD: ABNORMAL ML/MIN/{1.73_M2}
GLUCOSE BLD-MCNC: 94 MG/DL (ref 70–99)
HCT VFR BLD CALC: 45.6 % (ref 41–53)
HEMOGLOBIN: 15.2 G/DL (ref 13.5–17.5)
IMMATURE GRANULOCYTES: NORMAL %
LYMPHOCYTES # BLD: 18 % (ref 13–44)
MAGNESIUM: 2.7 MG/DL (ref 1.6–2.6)
MCH RBC QN AUTO: 31.3 PG (ref 26–34)
MCHC RBC AUTO-ENTMCNC: 33.2 G/DL (ref 31–37)
MCV RBC AUTO: 94.1 FL (ref 80–100)
MONOCYTES # BLD: 9 % (ref 5–9)
NRBC AUTOMATED: NORMAL PER 100 WBC
PDW BLD-RTO: 14.7 % (ref 12.1–15.2)
PHOSPHORUS: 5.9 MG/DL (ref 2.5–4.5)
PLATELET # BLD: 214 K/UL (ref 140–450)
PLATELET ESTIMATE: NORMAL
PMV BLD AUTO: NORMAL FL (ref 6–12)
POTASSIUM SERPL-SCNC: 5.1 MMOL/L (ref 3.7–5.3)
RBC # BLD: 4.85 M/UL (ref 4.5–5.9)
RBC # BLD: NORMAL 10*6/UL
SEG NEUTROPHILS: 71 % (ref 39–75)
SEGMENTED NEUTROPHILS ABSOLUTE COUNT: 5.3 K/UL (ref 2.1–6.5)
SODIUM BLD-SCNC: 134 MMOL/L (ref 135–144)
TSH SERPL DL<=0.05 MIU/L-ACNC: 2.4 MIU/L (ref 0.3–5)
WBC # BLD: 7.5 K/UL (ref 3.5–11)
WBC # BLD: NORMAL 10*3/UL

## 2021-08-24 PROCEDURE — 83735 ASSAY OF MAGNESIUM: CPT

## 2021-08-24 PROCEDURE — 1036F TOBACCO NON-USER: CPT | Performed by: STUDENT IN AN ORGANIZED HEALTH CARE EDUCATION/TRAINING PROGRAM

## 2021-08-24 PROCEDURE — 36415 COLL VENOUS BLD VENIPUNCTURE: CPT

## 2021-08-24 PROCEDURE — 84443 ASSAY THYROID STIM HORMONE: CPT

## 2021-08-24 PROCEDURE — 85025 COMPLETE CBC W/AUTO DIFF WBC: CPT

## 2021-08-24 PROCEDURE — G8419 CALC BMI OUT NRM PARAM NOF/U: HCPCS | Performed by: STUDENT IN AN ORGANIZED HEALTH CARE EDUCATION/TRAINING PROGRAM

## 2021-08-24 PROCEDURE — 80048 BASIC METABOLIC PNL TOTAL CA: CPT

## 2021-08-24 PROCEDURE — 99213 OFFICE O/P EST LOW 20 MIN: CPT | Performed by: STUDENT IN AN ORGANIZED HEALTH CARE EDUCATION/TRAINING PROGRAM

## 2021-08-24 PROCEDURE — 84100 ASSAY OF PHOSPHORUS: CPT

## 2021-08-24 PROCEDURE — G8427 DOCREV CUR MEDS BY ELIG CLIN: HCPCS | Performed by: STUDENT IN AN ORGANIZED HEALTH CARE EDUCATION/TRAINING PROGRAM

## 2021-08-24 RX ORDER — LANTHANUM CARBONATE 1000 MG/1
1 TABLET, CHEWABLE ORAL DAILY
COMMUNITY
Start: 2021-06-18

## 2021-08-24 NOTE — LETTER
[facility]  [address] [facility] [address]  Phone: [phone]  Fax: [address], DO        August 24, 2021     Patient: Joe Fernandez   YOB: 1975   Date of Visit: 8/24/2021       To Whom It May Concern: It is my medical opinion that Joe Fernandez be excused from work 8/24/2021 through 9/11/2021, returning to work 9/12/2021. If you have any questions or concerns, please don't hesitate to call.     Sincerely,        Ignacio Salinas, DO

## 2021-08-24 NOTE — PROGRESS NOTES
Hypertriglyceridemia       Reviewed all health maintenance requirements and ordered appropriate tests  Health Maintenance Due   Topic Date Due    HIV screen  Never done    Hepatitis B vaccine (1 of 3 - Risk Recombivax 3-dose series) Never done    DTaP/Tdap/Td vaccine (1 - Tdap) Never done    Colon cancer screen colonoscopy  08/17/2021       Past Surgical History:     Past Surgical History:   Procedure Laterality Date    DIALYSIS FISTULA CREATION      THYROID LOBECTOMY  2012    [facility]        Medications:       Prior to Admission medications    Medication Sig Start Date End Date Taking? Authorizing Provider   lanthanum (FOSRENOL) 1000 MG chewable tablet Take 1 tablet by mouth daily 6/18/21  Yes Historical Provider, MD   Sucroferric Oxyhydroxide (VELPHORO) 500 MG CHEW Take 2 tablets by mouth 2/12/21  Yes Historical Provider, MD   metoprolol succinate (TOPROL XL) 100 MG extended release tablet TAKE 1 TABLET BY MOUTH EVERY DAY 3/16/21  Yes Alden Hare DO   amLODIPine (NORVASC) 10 MG tablet TAKE 1 TABLET BY MOUTH EVERY DAY 3/16/21  Yes Alden Hare DO   lidocaine-prilocaine (EMLA) 2.5-2.5 % cream APPLY SMALL AMOUNT TO ACCESS SITE (AVF) 1 HOUR BEFORE DIALYSIS. COVER WITH OCCLUSIVE DRESSING (SARAN WRAP) 11/30/20  Yes Historical Provider, MD   ergocalciferol (ERGOCALCIFEROL) 1.25 MG (24622 UT) capsule TAKE 1 CAPSULE BY MOUTH EVERY 2 WEEKS 11/30/20  Yes Historical Provider, MD   hydrALAZINE (APRESOLINE) 50 MG tablet Take 1 tablet by mouth 2 times daily 11/19/19  Yes Alden Hare DO   sevelamer (RENVELA) 800 MG tablet Take 1 tablet by mouth 3 times daily (with meals)   Yes Historical Provider, MD        Allergies:       Sensipar  [cinacalcet]    Social History:     Tobacco:    reports that he has never smoked. He has never used smokeless tobacco.  Alcohol:      reports no history of alcohol use. Drug Use:  reports no history of drug use. Family History:     No family history on file.     Review of Systems:       Review of Systems   Constitutional: Positive for fatigue. Negative for chills and fever. HENT: Negative for sinus pain, sneezing and sore throat. Respiratory: Negative for cough and wheezing. Cardiovascular: Negative for chest pain. Gastrointestinal: Negative for abdominal pain, diarrhea, nausea and vomiting. Genitourinary:        Patient does not make urine   Musculoskeletal:        \"bone pain\" in limbs  Cramping in calves           Physical Exam:     Vitals:  /66 (Site: Right Upper Arm, Position: Sitting, Cuff Size: Medium Adult)   Pulse 64   Ht 5' 4\" (1.626 m)   Wt 160 lb 9.6 oz (72.8 kg)   BMI 27.57 kg/m²       Physical Exam  Vitals and nursing note reviewed. Constitutional:       General: He is not in acute distress. Appearance: Normal appearance. Cardiovascular:      Rate and Rhythm: Normal rate and regular rhythm. Pulses: Normal pulses. Heart sounds: Normal heart sounds. No murmur heard. Musculoskeletal:         General: No swelling or tenderness. Normal range of motion. Right lower leg: No edema. Left lower leg: No edema. Comments: LUE AV fistula with palpable thrill, surgical scarring present with bruising over draw sites in proximal AV fistula. Skin:     General: Skin is warm and dry. Capillary Refill: Capillary refill takes less than 2 seconds. Neurological:      General: No focal deficit present. Mental Status: He is alert and oriented to person, place, and time. Mental status is at baseline. Psychiatric:         Mood and Affect: Mood normal.         Behavior: Behavior normal.         Thought Content:  Thought content normal.                 Data:     Lab Results   Component Value Date     05/03/2021    K 4.0 05/03/2021    CL 96 05/03/2021    CO2 28 05/03/2021    BUN 33 05/03/2021    CREATININE 7.08 05/03/2021    GLUCOSE 97 05/03/2021    PROT 8.6 05/03/2021    LABALBU 5.1 05/03/2021    BILITOT 0.36 05/03/2021 ALKPHOS 100 05/03/2021    AST 15 05/03/2021    ALT 12 05/03/2021     Lab Results   Component Value Date    WBC 6.6 05/04/2021    RBC 4.13 05/04/2021    HGB 12.9 05/04/2021    HCT 38.6 05/04/2021    MCV 93.5 05/04/2021    MCH 31.4 05/04/2021    MCHC 33.5 05/04/2021    RDW 14.9 05/04/2021     05/04/2021    MPV NOT REPORTED 05/04/2021     Lab Results   Component Value Date    TSH 1.18 02/09/2019     Lab Results   Component Value Date    CHOL 153 02/09/2019    HDL 39 02/09/2019    LABA1C 4.6 09/20/2016          Assessment & Plan        Diagnosis Orders   1. Fatigue, unspecified type  Basic Metabolic Panel    Phosphorus    Magnesium    CBC Auto Differential   2. ESRD (end stage renal disease) on dialysis [facility])  Basic Metabolic Panel    Phosphorus    Magnesium    CBC Auto Differential   3. Bone pain  Basic Metabolic Panel    Phosphorus    Magnesium    CBC Auto Differential       1. Differential diagnosis broad, but mainly encompassing electrolyte derangement. I would expect someone with end-stage renal disease to be chronically fatigued, but this is unusual this is a new problem for him. Will reevaluate electrolytes, as well as TSH. I also written him off work for the next 2 weeks. He does have a follow-up with visit with his primary care physician on 9/14/2021. May follow-up as needed for them. I also called discussed the case with his nephrologist once labs result. Update: I approximately 1300 hrs., I was able to have a discussion with a colleague of Dr. Vilma Lopez regarding this patient's current condition. Overall, he was very mildly hypophosphatemic, and none of his other electrolyte derangements were significantly different than previously values. His serum creatinine was elevated compared to previous value, but this is a off day for dialysis.     Ultimately, his nephrologist office may believe that they have been too aggressive with fluid removal during his most recent bouts of dialysis, and will you at your next visit! Dr. Aditya Jain        Electronically signed by Walt Freeman DO on 8/24/2021 at 8:53 AM           Completed Refills   Requested Prescriptions      No prescriptions requested or ordered in this encounter         Chan Collins received counseling on the following healthy behaviors: nutrition, exercise and medication adherence  Reviewed prior labs and health maintenance. Continue current medications, diet and exercise. Discussed use, benefit, and side effects of prescribed medications. Barriers to medication compliance addressed. Patient given educational materials - see patient instructions. All patient questions answered. Patient voiced understanding.

## 2021-08-24 NOTE — Clinical Note
Hi Dr. Joshua Moreon, just wanted to let you know that I had seen this patient of yours, no evidence of any acute life-threatening medical complication, or electrolyte derangement. He does have regular follow-up with his nephrologist, I have also spoken with their office regarding this problem. Basically, they are little concerned they may have been too aggressive with pulling fluid off of him during hemodialysis, and will be monitoring his symptoms with less fluid reduction at his next hemodialysis visit.   Thanks,  [facility]

## 2021-08-25 ASSESSMENT — ENCOUNTER SYMPTOMS
SORE THROAT: 0
VOMITING: 0
ABDOMINAL PAIN: 0
COUGH: 0
DIARRHEA: 0
SINUS PAIN: 0
WHEEZING: 0
NAUSEA: 0

## 2021-09-16 ENCOUNTER — OFFICE VISIT (OUTPATIENT)
Dept: FAMILY MEDICINE CLINIC | Age: 46
End: 2021-09-16
Payer: MEDICARE

## 2021-09-16 VITALS
HEIGHT: 64 IN | WEIGHT: 160 LBS | OXYGEN SATURATION: 100 % | DIASTOLIC BLOOD PRESSURE: 62 MMHG | TEMPERATURE: 99 F | HEART RATE: 102 BPM | BODY MASS INDEX: 27.31 KG/M2 | SYSTOLIC BLOOD PRESSURE: 102 MMHG

## 2021-09-16 DIAGNOSIS — I73.9 CLAUDICATION OF LOWER EXTREMITY (HCC): ICD-10-CM

## 2021-09-16 DIAGNOSIS — N18.6 ESRD (END STAGE RENAL DISEASE) ON DIALYSIS (HCC): Primary | ICD-10-CM

## 2021-09-16 DIAGNOSIS — Z99.2 ESRD (END STAGE RENAL DISEASE) ON DIALYSIS (HCC): Primary | ICD-10-CM

## 2021-09-16 DIAGNOSIS — M89.8X9 BONE PAIN: ICD-10-CM

## 2021-09-16 DIAGNOSIS — M79.604 BILATERAL LEG PAIN: ICD-10-CM

## 2021-09-16 DIAGNOSIS — M79.605 BILATERAL LEG PAIN: ICD-10-CM

## 2021-09-16 PROCEDURE — 99213 OFFICE O/P EST LOW 20 MIN: CPT | Performed by: STUDENT IN AN ORGANIZED HEALTH CARE EDUCATION/TRAINING PROGRAM

## 2021-09-16 PROCEDURE — G8419 CALC BMI OUT NRM PARAM NOF/U: HCPCS | Performed by: STUDENT IN AN ORGANIZED HEALTH CARE EDUCATION/TRAINING PROGRAM

## 2021-09-16 PROCEDURE — G8427 DOCREV CUR MEDS BY ELIG CLIN: HCPCS | Performed by: STUDENT IN AN ORGANIZED HEALTH CARE EDUCATION/TRAINING PROGRAM

## 2021-09-16 PROCEDURE — 1036F TOBACCO NON-USER: CPT | Performed by: STUDENT IN AN ORGANIZED HEALTH CARE EDUCATION/TRAINING PROGRAM

## 2021-09-16 ASSESSMENT — ENCOUNTER SYMPTOMS
COUGH: 0
NAUSEA: 0
SHORTNESS OF BREATH: 0
SORE THROAT: 0
DIARRHEA: 0
ABDOMINAL PAIN: 0
WHEEZING: 0
SINUS PAIN: 0
VOMITING: 0

## 2021-09-16 NOTE — PROGRESS NOTES
HPI Notes    Name: Maximus Hernandez  : 1975         Chief Complaint:     Chief Complaint   Patient presents with    Follow-up     Patient comes in for f/u in regards to pain in his feet. States he has no change. States constant pain, worsens when standing and getting up. patient has not been to worse since 21.  Other     inturp: [id number] Binh President   [id number] [address]        History of Present Illness:      HPI    This is a very nice 78-year-old Maldivian-speaking man presenting for follow-up regarding lower extremity \"bone pain\". This interview was conducted with interpreting services, our  was [facility], her ID number was [id number]. Apparently, Dr. Manuel Agustin, his nephrologist, would like to order an ultrasound of the lower extremity. When questioned about this, he relates that this was to [facility] circulation\". He reports that he still has constant pain in his feet (particularly ankles, and dorsal midfoot), worsening when standing, rising from a seated position, relieved by sitting, resting. On walking, ascending/descending stairs, he reports that the pain is more associated with the posterior calf muscles. He has not returned to work since our previous visit. He reports that his fatigue is improving and \"doesn't feel so much of that right now\". His next appointment with Dr. Manuel Agustin is unknown to him, as she conducts her appointments periodically at his dialysis appointments. He is wondering if his history of COVID-19 may have caused this problem.      Past Medical History:     Past Medical History:   Diagnosis Date    End-stage renal disease on hemodialysis ([facility] [address]     since , due to polycystic kidney disease    Hypertension     Hypertriglyceridemia       Reviewed all health maintenance requirements and ordered appropriate tests  Health Maintenance Due   Topic Date Due    HIV screen  Never done    Hepatitis B vaccine (1 of 3 - Risk Recombivax 3-dose series) Never done   Apoorva Valladares DTaP/Tdap/Td vaccine (1 - Tdap) Never done    Flu vaccine (1) 09/01/2021    Annual Wellness Visit (AWV)  Never done    Colon cancer screen colonoscopy  08/17/2021       Past Surgical History:     Past Surgical History:   Procedure Laterality Date    DIALYSIS FISTULA CREATION      TH[facility]RO[facility]Y  2012    [facility]        Medications:       Prior to Admission medications    Medication Sig Start Date End Date Taking? Authorizing Provider   lanthanum (FOSRENOL) 1000 MG chewable tablet Take 1 tablet by mouth daily 6/18/21  Yes Historical Provider, MD   Sucroferric Oxyhydroxide (VELPHORO) 500 MG CHEW Take 2 tablets by mouth 2/12/21  Yes Historical Provider, MD   metoprolol succinate (TOPROL XL) 100 MG extended release tablet TAKE 1 TABLET BY MOUTH EVERY DAY 3/16/21  Yes Tom Likes, DO   amLODIPine (NORVASC) 10 MG tablet TAKE 1 TABLET BY MOUTH EVERY DAY 3/16/21  Yes Tom Likes, DO   lidocaine-prilocaine (EMLA) 2.5-2.5 % cream APPLY SMALL AMOUNT TO ACCESS SITE (AVF) 1 HOUR BEFORE DIALYSIS. COVER WITH OCCLUSIVE DRESSING (SARAN WRAP) 11/30/20  Yes Historical Provider, MD   ergocalciferol (ERGOCALCIFEROL) 1.25 MG (74994 UT) capsule TAKE 1 CAPSULE BY MOUTH EVERY 2 WEEKS 11/30/20  Yes Historical Provider, MD   hydrALAZINE (APRESOLINE) 50 MG tablet Take 1 tablet by mouth 2 times daily 11/19/19  Yes Tom Likes, DO   sevelamer (RENVELA) 800 MG tablet Take 1 tablet by mouth 3 times daily (with meals)   Yes Historical Provider, MD        Allergies:       Sensipar  [cinacalcet]    Social History:     Tobacco:    reports that he has never smoked. He has never used smokeless tobacco.  Alcohol:      reports no history of alcohol use. Drug Use:  reports no history of drug use. Family History:     No family history on file. Review of Systems:         Review of Systems   Constitutional: Negative for fatigue and fever. HENT: Negative for sinus pain, sneezing and sore throat.     Respiratory: Negative for cough, 2.40 08/24/2021     Lab Results   Component Value Date    CHOL 153 02/09/2019    HDL 39 02/09/2019    LABA1C 4.6 09/20/2016          Assessment & Plan        Diagnosis Orders   1. ESRD (end stage renal disease) on dialysis ([facility] [address]     2. Bilateral leg pain     3. Claudication of lower extremity (HCC)  VL MARILYN BILATERAL LIMITED 1-2 LEVELS   4. Bone pain         1. It sounds that his nephrologist was concerned for claudication, which is reasonable given further exploration of the location, quality, timing of his discomfort. We will order bilateral MARILYN today, reevaluate in 2 weeks. Follow-up in 2 weeks        Completed Refills   Requested Prescriptions      No prescriptions requested or ordered in this encounter     Return in about 2 weeks (around 9/30/2021) for lower extremity discomfort. No orders of the defined types were placed in this encounter. Orders Placed This Encounter   Procedures    VL MARILYN BILATERAL LIMITED 1-2 LEVELS     Standing Status:   Future     Standing Expiration Date:   9/16/2022     Order Specific Question:   Reason for exam:     Answer:   bilateral LE pain with walking (concern for PAD with claudication)         There are no Patient Instructions on file for this visit. Electronically signed by Livan Moise DO on 9/16/2021 at 4:05 PM           Completed Refills   Requested Prescriptions      No prescriptions requested or ordered in this encounter         Americo Hirsch received counseling on the following healthy behaviors: nutrition, exercise and medication adherence  Reviewed prior labs and health maintenance. Continue current medications, diet and exercise. Discussed use, benefit, and side effects of prescribed medications. Barriers to medication compliance addressed. Patient given educational materials - see patient instructions. All patient questions answered. Patient voiced understanding.

## 2021-09-16 NOTE — LETTER
[facility] [address] [place] [address]  Phone: [phone]  Fax: [address], DO        September 16, 2021     Patient: Blaine Kingsley   YOB: 1975   Date of Visit: 9/16/2021       To Whom It May Concern: It is my medical opinion that Blaine Kingsley be excused from work from 9/12/21 through current date. we are currently working to improve his condition so he is able to return. .    If you have any questions or concerns, please don't hesitate to call.     Sincerely,          Army Robert DO

## 2021-09-23 ENCOUNTER — HOSPITAL ENCOUNTER (OUTPATIENT)
Dept: VASCULAR LAB | Age: 46
Discharge: HOME | End: 2021-09-25
Payer: MEDICARE

## 2021-09-23 DIAGNOSIS — I73.9 CLAUDICATION OF LOWER EXTREMITY (HCC): ICD-10-CM

## 2021-09-23 PROCEDURE — 93922 UPR/L XTREMITY ART 2 LEVELS: CPT

## 2021-09-30 ENCOUNTER — OFFICE VISIT (OUTPATIENT)
Dept: FAMILY MEDICINE CLINIC | Age: 46
End: 2021-09-30
Payer: MEDICARE

## 2021-09-30 ENCOUNTER — TELEPHONE (OUTPATIENT)
Dept: FAMILY MEDICINE CLINIC | Age: 46
End: 2021-09-30

## 2021-09-30 VITALS — OXYGEN SATURATION: 97 % | HEIGHT: 64 IN | HEART RATE: 85 BPM | WEIGHT: 162 LBS | BODY MASS INDEX: 27.66 KG/M2

## 2021-09-30 DIAGNOSIS — M79.604 BILATERAL LEG PAIN: Primary | ICD-10-CM

## 2021-09-30 DIAGNOSIS — M79.605 BILATERAL LEG PAIN: Primary | ICD-10-CM

## 2021-09-30 PROCEDURE — G8427 DOCREV CUR MEDS BY ELIG CLIN: HCPCS | Performed by: STUDENT IN AN ORGANIZED HEALTH CARE EDUCATION/TRAINING PROGRAM

## 2021-09-30 PROCEDURE — 99213 OFFICE O/P EST LOW 20 MIN: CPT | Performed by: STUDENT IN AN ORGANIZED HEALTH CARE EDUCATION/TRAINING PROGRAM

## 2021-09-30 PROCEDURE — G8419 CALC BMI OUT NRM PARAM NOF/U: HCPCS | Performed by: STUDENT IN AN ORGANIZED HEALTH CARE EDUCATION/TRAINING PROGRAM

## 2021-09-30 PROCEDURE — 1036F TOBACCO NON-USER: CPT | Performed by: STUDENT IN AN ORGANIZED HEALTH CARE EDUCATION/TRAINING PROGRAM

## 2021-09-30 ASSESSMENT — ENCOUNTER SYMPTOMS
DIARRHEA: 0
WHEEZING: 0
ABDOMINAL PAIN: 0
VOMITING: 0
NAUSEA: 0
SINUS PAIN: 0
COUGH: 0
SORE THROAT: 0
BACK PAIN: 0

## 2021-09-30 NOTE — PROGRESS NOTES
HPI Notes    Name: Xuan Barone  : 1975         Chief Complaint:     Chief Complaint   Patient presents with    Leg Pain     interpret #[id number].  still having BLE pain       History of Present Illness:        HPI    This is a very nice 52-year-old [place]an-speaking man presenting for follow-up regarding lower extremity \"bone pain\" after MARILYN has resulted. This interview was conducted with interpreting services, our 's ID number was [id number]. MARILYN did not demonstrate waveforms present at both ankles, but was not able to compare this to his upper extremity due to his fistula placement. And also mentioned that there is notable calcification of the lower extremity arteries and suggested that a aortogram with runoff study may be beneficial study to consider. I have also completed time off work paperwork prior to this office visit. He reports that his lower extremity pain is remained unchanged since previous visit. He still describes this as a constant discomfort of the midfoot and forefoot, worse with standing, motion, relieved by sitting. He also reports an episode of hands/feet trembling last week after mowing the grass. He also endorses decreased sensation of the fingers, toes, which he reports is not new. He also wishes to direct my attention to his most recent BMP from Dr. Alejo Molina, which has his serum calcium as being low over the past two months.      Past Medical History:     Past Medical History:   Diagnosis Date    End-stage renal disease on hemodialysis ([facility] [address]     since , due to polycystic kidney disease    Hypertension     Hypertriglyceridemia       Reviewed all health maintenance requirements and ordered appropriate tests  Health Maintenance Due   Topic Date Due    HIV screen  Never done    Hepatitis B vaccine (1 of 3 - Risk Recombivax 3-dose series) Never done    DTaP/Tdap/Td vaccine (1 - Tdap) Never done    Colon cancer screen colonoscopy  Never done       Past Surgical History:     Past Surgical History:   Procedure Laterality Date    DIALYSIS FISTULA CREATION      THYROID LOBECTOMY  2012    [facility]        Medications:       Prior to Admission medications    Medication Sig Start Date End Date Taking? Authorizing Provider   lanthanum (FOSRENOL) 1000 MG chewable tablet Take 1 tablet by mouth daily 6/18/21  Yes Historical Provider, MD   Sucroferric Oxyhydroxide (VELPHORO) 500 MG CHEW Take 2 tablets by mouth 2/12/21  Yes Historical Provider, MD   metoprolol succinate (TOPROL XL) 100 MG extended release tablet TAKE 1 TABLET BY MOUTH EVERY DAY 3/16/21  Yes Tnaja Rosario DO   amLODIPine (NORVASC) 10 MG tablet TAKE 1 TABLET BY MOUTH EVERY DAY 3/16/21  Yes Tanja Rosario DO   lidocaine-prilocaine (EMLA) 2.5-2.5 % cream APPLY SMALL AMOUNT TO ACCESS SITE (AVF) 1 HOUR BEFORE DIALYSIS. COVER WITH OCCLUSIVE DRESSING (SARAN WRAP) 11/30/20  Yes Historical Provider, MD   ergocalciferol (ERGOCALCIFEROL) 1.25 MG (06299 UT) capsule TAKE 1 CAPSULE BY MOUTH EVERY 2 WEEKS 11/30/20  Yes Historical Provider, MD   hydrALAZINE (APRESOLINE) 50 MG tablet Take 1 tablet by mouth 2 times daily 11/19/19  Yes Tanja Rosario DO   sevelamer (RENVELA) 800 MG tablet Take 1 tablet by mouth 3 times daily (with meals)   Yes Historical Provider, MD        Allergies:       Sensipar  [cinacalcet]    Social History:     Tobacco:    reports that he has never smoked. He has never used smokeless tobacco.  Alcohol:      reports no history of alcohol use. Drug Use:  reports no history of drug use. Family History:     No family history on file. Review of Systems:         Review of Systems   Constitutional: Negative for fever. HENT: Negative for sinus pain, sneezing and sore throat. Respiratory: Negative for cough and wheezing. Cardiovascular: Negative for chest pain. Gastrointestinal: Negative for abdominal pain, diarrhea, nausea and vomiting.    Musculoskeletal: Negative for arthralgias, back wear 15-20mmHg compression stockings. It is possible hypocalcemia could be the cause, or at least, a possible factor. Will discuss with Dr. Anahy Membreno office for replacement of calcium during next HD vs OP PO calcium. Time off work paperwork has completed. For the time being, I would recommend he only engage with the following work restrictions: Occasional walking, lifting, running, other strenuous activity and lifting no more than 10 pounds with either arm. I believe he is competent to endorse checks, I do not believe he has any cognitive impairment. Return to work date is unclear at this point, would recommend he be off work through 12/31/2021 for the time being. Follow up as needed, depending on results of CT aortogram with runoff. Completed Refills   Requested Prescriptions      No prescriptions requested or ordered in this encounter     No follow-ups on file. No orders of the defined types were placed in this encounter. No orders of the defined types were placed in this encounter. There are no Patient Instructions on file for this visit. Electronically signed by Harriet Hale DO on 9/30/2021 at 1:13 PM         Completed Refills   Requested Prescriptions      No prescriptions requested or ordered in this encounter         Tato Marie received counseling on the following healthy behaviors: medication adherence  Reviewed prior labs and health maintenance. Continue current medications, diet and exercise. Discussed use, benefit, and side effects of prescribed medications. Barriers to medication compliance addressed. Patient given educational materials - see patient instructions. All patient questions answered. Patient voiced understanding.

## 2021-09-30 NOTE — LETTER
[facility]  [address], [address]  Phone: [phone]  Fax: [phone], [place]        October 5, 2021    Laquita Godfrey  [address]  [place] [id number]      To whom it may concern:    Mr. Jacquelyn Gipson has seen me at the [facility] office several times, complaining of bilateral lower extremity discomfort; I had ordered an MARILYN which did demonstrate waveforms in the lower extremity along with arterial calcification. I have ordered an aortogram with lower extremity runoff to further examine whether or not he has claudication from PAD. He did share with me that his last several dialysis visits he had been hypocalcemic; I would simply like to know if hypocalcemia could be a reasonable explanation for his persistent foot discomfort as both have been concurrent. Regarding management of his hypocalcemia, I would be interested to hear your advice as well. Thank you for your assistance in managing his medical conditions. If you have any questions or concerns, please don't hesitate to call.     Sincerely,          Christofer Crespo DO

## 2021-09-30 NOTE — TELEPHONE ENCOUNTER
Per Dr. Bui Piggs is low, does this explain his  Foot pain. Can he get more at next dialysis appt vs taking a supplement.   Message left for Dialysis to return call      [phone]

## 2021-09-30 NOTE — PATIENT INSTRUCTIONS
Redge Crumpton ajit pares de medias de compresión que lleguen hasta la rodilla. Compre unos que tengan josseline compresión de 15-20 mmgHg. Úselos todos los días xi el día. He ordenado josseline tomografía computarizada de las piernas para bernadette si tiene josseline enfermedad arterial. Llamaré al Dr. Luca Gonsales para preguntarle si necesita un suplemento de calcio. Lo llamaremos con los resultados de purdy escaneo.   Dr. Jovanny Cox

## 2021-10-05 NOTE — TELEPHONE ENCOUNTER
2 messages were left last week for the dialysis center to call back regarding the calcium level of this patient. I just called again and was asked to call back later. Please type a letter with the information needed and I will fax to Dr. Mariola Middleton in hopes of getting a response from someone.

## 2021-10-07 ENCOUNTER — TELEPHONE (OUTPATIENT)
Dept: FAMILY MEDICINE CLINIC | Age: 46
End: 2021-10-07

## 2021-10-07 DIAGNOSIS — M79.604 BILATERAL LEG PAIN: Primary | ICD-10-CM

## 2021-10-07 DIAGNOSIS — I73.9 CLAUDICATION OF LOWER EXTREMITY (HCC): ICD-10-CM

## 2021-10-07 DIAGNOSIS — M79.605 BILATERAL LEG PAIN: Primary | ICD-10-CM

## 2021-10-07 DIAGNOSIS — Z99.2 ESRD (END STAGE RENAL DISEASE) ON DIALYSIS (HCC): ICD-10-CM

## 2021-10-07 DIAGNOSIS — N18.6 ESRD (END STAGE RENAL DISEASE) ON DIALYSIS (HCC): ICD-10-CM

## 2021-10-07 NOTE — TELEPHONE ENCOUNTER
Received a fax stating CTA abdomin aorta Dx is no being covered by insurance can we please fix dx so he can be scheduled.  Thank you       Order pending no dx associated

## 2021-10-12 ENCOUNTER — TELEPHONE (OUTPATIENT)
Dept: FAMILY MEDICINE CLINIC | Age: 46
End: 2021-10-12

## 2021-10-12 DIAGNOSIS — I70.212 ATHEROSCLEROSIS OF NATIVE ARTERY OF LEFT LOWER EXTREMITY WITH INTERMITTENT CLAUDICATION (HCC): ICD-10-CM

## 2021-10-12 DIAGNOSIS — I70.211 ATHEROSCLEROSIS OF NATIVE ARTERY OF RIGHT LOWER EXTREMITY WITH INTERMITTENT CLAUDICATION (HCC): Primary | ICD-10-CM

## 2021-10-12 NOTE — TELEPHONE ENCOUNTER
Eliana from scheduling is calling about the CTA abdominal aorta that Dr. Elif Astorga had ordered. She said its not passing with the diagnosis that was put in and would like this looked into and changed. Health Maintenance   Topic Date Due    HIV screen  Never done    Hepatitis B vaccine (1 of 3 - Risk Recombivax 3-dose series) Never done    DTaP/Tdap/Td vaccine (1 - Tdap) Never done    Colon cancer screen colonoscopy  Never done    Lipid screen  09/28/2026    Pneumococcal 0-64 years Vaccine (4 of 4 - PPSV23) 08/17/2041    Flu vaccine  Completed    COVID-19 Vaccine  Completed    Hepatitis C screen  Completed    Hepatitis A vaccine  Aged Out    Hib vaccine  Aged Out    Meningococcal (ACWY) vaccine  Aged Out             (applicable per patient's age: Cancer Screenings, Depression Screening, Fall Risk Screening, Immunizations)    Hemoglobin A1C (%)   Date Value   09/20/2016 4.6 (L)     LDL Cholesterol (mg/dL)   Date Value   02/09/2019 58     AST (U/L)   Date Value   05/03/2021 15     ALT (U/L)   Date Value   05/03/2021 12     BUN (mg/dL)   Date Value   08/24/2021 55 (H)      (goal A1C is < 7)   (goal LDL is <100) need 30-50% reduction from baseline     BP Readings from Last 3 Encounters:   09/16/21 102/62   08/24/21 104/66   05/11/21 118/68    (goal /80)      All Future Testing planned in [facility]:  Lab Frequency Next Occurrence   CTA ABDOMINAL AORTA W BILAT RUNOFF W CONTRAST Once 09/30/2021   CTA ABDOMINAL AORTA W BILAT RUNOFF W CONTRAST Once 10/07/2021       Next Visit Date:  No future appointments.          Patient Active Problem List:     ESRD (end stage renal disease) on dialysis ([address]     Uncontrolled hypertension     Hypertriglyceridemia     Vitamin D deficiency     Chronic glomerulonephritis with pathological lesion in kidney     Secondary hyperparathyroidism ([address]     Polycystic kidney disease

## 2021-10-13 NOTE — TELEPHONE ENCOUNTER
[place],    Try arterial calcification: I70.90    Reason: Visible arterial calcifications on MARILYN, would recommend further work-up with CTA aorta bilateral runoff

## 2021-10-13 NOTE — TELEPHONE ENCOUNTER
Spoke with scheduling they tried Bilateral leg pain (M79.604,M79.605 [ICD-10-CM]); ESRD (end stage renal disease) on dialysis ([facility] [address] (N18.6,Z99.2 [ICD-10-CM]); Claudication of lower extremity (HCC) (I73.9 [ICD-10-CM]) hypocalcemia, PAD     Each dx code and nothing is going through.  Please advise       black phone is [phone]

## 2021-10-15 NOTE — TELEPHONE ENCOUNTER
Need new order with dx. After order was processed they are now stating I70.90 is not specific enough for his insurance.  Can you put a new dx code and sign  off on a new order     Thank you

## 2021-10-21 ENCOUNTER — HOSPITAL ENCOUNTER (OUTPATIENT)
Dept: CT IMAGING | Age: 46
Discharge: HOME | End: 2021-10-23
Payer: MEDICARE

## 2021-10-21 DIAGNOSIS — I70.212 ATHEROSCLEROSIS OF NATIVE ARTERY OF LEFT LOWER EXTREMITY WITH INTERMITTENT CLAUDICATION (HCC): ICD-10-CM

## 2021-10-21 DIAGNOSIS — I70.211 ATHEROSCLEROSIS OF NATIVE ARTERY OF RIGHT LOWER EXTREMITY WITH INTERMITTENT CLAUDICATION (HCC): ICD-10-CM

## 2021-10-21 PROCEDURE — 6360000004 HC RX CONTRAST MEDICATION: Performed by: STUDENT IN AN ORGANIZED HEALTH CARE EDUCATION/TRAINING PROGRAM

## 2021-10-21 PROCEDURE — 75635 CT ANGIO ABDOMINAL ARTERIES: CPT

## 2021-10-21 PROCEDURE — 76376 3D RENDER W/INTRP POSTPROCES: CPT

## 2021-10-21 RX ADMIN — IOPAMIDOL 100 ML: 755 INJECTION, SOLUTION INTRAVENOUS at 10:34

## 2021-10-25 ENCOUNTER — TELEPHONE (OUTPATIENT)
Dept: FAMILY MEDICINE CLINIC | Age: 46
End: 2021-10-25

## 2021-10-25 NOTE — TELEPHONE ENCOUNTER
----- Message from Shruthi Moreno sent at 10/25/2021 12:49 PM EDT -----  Subject: Message to Provider    QUESTIONS  Information for Provider? Wants to know if the order for CT aortigram. was   ordered and completed for patient. Freda Escobar from [facility] term disability   requesting information on this. Thank you.  ---------------------------------------------------------------------------  --------------  CALL BACK INFO  What is the best way for the office to contact you? OK to leave message on   voicemail  Preferred Call Back Phone Number? [phone]  ---------------------------------------------------------------------------  --------------  SCRIPT ANSWERS  Relationship to Patient? Third Party  Representative Name?  Freda Escobar

## 2021-10-29 DIAGNOSIS — N28.89 RENAL MASS, LEFT: Primary | ICD-10-CM

## 2021-11-09 ENCOUNTER — HOSPITAL ENCOUNTER (OUTPATIENT)
Dept: MRI IMAGING | Age: 46
Discharge: HOME | End: 2021-11-11
Payer: MEDICARE

## 2021-11-09 DIAGNOSIS — N28.89 RENAL MASS, LEFT: ICD-10-CM

## 2021-11-09 PROCEDURE — 74183 MRI ABD W/O CNTR FLWD CNTR: CPT

## 2021-11-09 PROCEDURE — 6360000004 HC RX CONTRAST MEDICATION: Performed by: STUDENT IN AN ORGANIZED HEALTH CARE EDUCATION/TRAINING PROGRAM

## 2021-11-09 PROCEDURE — A9577 INJ MULTIHANCE: HCPCS | Performed by: STUDENT IN AN ORGANIZED HEALTH CARE EDUCATION/TRAINING PROGRAM

## 2021-11-09 RX ADMIN — GADOBENATE DIMEGLUMINE 8 ML: 529 INJECTION, SOLUTION INTRAVENOUS at 09:47

## 2021-11-16 ENCOUNTER — TELEPHONE (OUTPATIENT)
Dept: FAMILY MEDICINE CLINIC | Age: 46
End: 2021-11-16

## 2021-11-17 DIAGNOSIS — M79.605 BILATERAL LEG PAIN: Primary | ICD-10-CM

## 2021-11-17 DIAGNOSIS — M79.604 BILATERAL LEG PAIN: Primary | ICD-10-CM

## 2021-11-17 RX ORDER — GABAPENTIN 100 MG/1
CAPSULE ORAL
Qty: 30 CAPSULE | Refills: 2 | Status: SHIPPED | OUTPATIENT
Start: 2021-11-17 | End: 2022-08-28

## 2022-01-13 ENCOUNTER — TELEPHONE (OUTPATIENT)
Dept: FAMILY MEDICINE CLINIC | Age: 47
End: 2022-01-13

## 2022-01-13 NOTE — TELEPHONE ENCOUNTER
Patient would like to return to work on 01/23 - asking for a RTW letter - will also be sending new [facility] paperwork to be filled out    Health Maintenance   Topic Date Due    HIV screen  Never done    Hepatitis B vaccine (1 of 3 - Risk Recombivax 3-dose series) Never done    DTaP/Tdap/Td vaccine (1 - Tdap) Never done    COVID-19 Vaccine (2 - Moderna 3-dose series) 04/06/2021    Colon cancer screen colonoscopy  Never done    Depression Screen  03/16/2022    Lipid screen  09/28/2026    Pneumococcal 0-64 years Vaccine (4 of 4 - PPSV23) 08/17/2041    Flu vaccine  Completed    Hepatitis C screen  Completed    Hepatitis A vaccine  Aged Out    Hib vaccine  Aged Out    Meningococcal (ACWY) vaccine  Aged Out             (applicable per patient's age: Cancer Screenings, Depression Screening, Fall Risk Screening, Immunizations)    Hemoglobin A1C (%)   Date Value   09/20/2016 4.6 (L)     LDL Cholesterol (mg/dL)   Date Value   02/09/2019 58     AST (U/L)   Date Value   05/03/2021 15     ALT (U/L)   Date Value   05/03/2021 12     BUN (mg/dL)   Date Value   08/24/2021 55 (H)      (goal A1C is < 7)   (goal LDL is <100) need 30-50% reduction from baseline     BP Readings from Last 3 Encounters:   09/16/21 102/62   08/24/21 104/66   05/11/21 118/68    (goal /80)      All Future Testing planned in [facility]:  Lab Frequency Next Occurrence   CTA ABDOMINAL AORTA W BILAT RUNOFF W CONTRAST Once 09/29/2022       Next Visit Date:  No future appointments.          Patient Active Problem List:     ESRD (end stage renal disease) on dialysis ([address]     Uncontrolled hypertension     Hypertriglyceridemia     Vitamin D deficiency     Chronic glomerulonephritis with pathological lesion in kidney     Secondary hyperparathyroidism ([address]     Polycystic kidney disease

## 2022-01-13 NOTE — LETTER
[facility] [address] [address]  Phone: [phone]  Fax: [address], [place]        January 13, 2022     Patient: Juma Gray   YOB: 1975   Date of Visit: 1/13/2022       To Whom It May Concern: It is my medical opinion that Juma Gray may return to work on 1/23/22 with no restrictions. If you have any questions or concerns, please don't hesitate to call.     Sincerely,          Mary Bowles, DO

## 2022-02-15 ENCOUNTER — TELEPHONE (OUTPATIENT)
Dept: FAMILY MEDICINE CLINIC | Age: 47
End: 2022-02-15

## 2022-02-15 NOTE — TELEPHONE ENCOUNTER
----- Message from [email or web address] sent at 2/15/2022  1:33 PM EST -----  Subject: Message to Provider    QUESTIONS  Information for Provider? Patient would like a paper releasing him to go   back to work ASAP.   ---------------------------------------------------------------------------  --------------  [address]  What is the best way for the office to contact you? OK to leave message on   voicemail  Preferred Call Back Phone Number? [phone]  ---------------------------------------------------------------------------  --------------  SCRIPT ANSWERS  Relationship to Patient?  Self

## 2022-02-15 NOTE — TELEPHONE ENCOUNTER
Pt never picked up letter spoke with [facility] on 01/13/2022. Advised it is up front and we have not seen him since  Sept 2021 and he should follow up . appointment  02/24/2022

## 2022-02-21 ENCOUNTER — HOSPITAL ENCOUNTER (EMERGENCY)
Age: 47
Discharge: HOME | End: 2022-02-21
Attending: EMERGENCY MEDICINE
Payer: MEDICARE

## 2022-02-21 ENCOUNTER — APPOINTMENT (OUTPATIENT)
Dept: ULTRASOUND IMAGING | Age: 47
End: 2022-02-21
Payer: MEDICARE

## 2022-02-21 VITALS
OXYGEN SATURATION: 97 % | DIASTOLIC BLOOD PRESSURE: 87 MMHG | BODY MASS INDEX: 27.81 KG/M2 | HEART RATE: 53 BPM | WEIGHT: 162 LBS | SYSTOLIC BLOOD PRESSURE: 155 MMHG | RESPIRATION RATE: 20 BRPM | TEMPERATURE: 98.8 F

## 2022-02-21 DIAGNOSIS — K80.50 BILIARY COLIC: ICD-10-CM

## 2022-02-21 DIAGNOSIS — K80.20 CALCULUS OF GALLBLADDER WITHOUT CHOLECYSTITIS WITHOUT OBSTRUCTION: ICD-10-CM

## 2022-02-21 DIAGNOSIS — K85.80 OTHER ACUTE PANCREATITIS, UNSPECIFIED COMPLICATION STATUS: Primary | ICD-10-CM

## 2022-02-21 DIAGNOSIS — R10.11 RIGHT UPPER QUADRANT ABDOMINAL PAIN: ICD-10-CM

## 2022-02-21 LAB
ABSOLUTE EOS #: 0.1 K/UL (ref 0–0.4)
ABSOLUTE IMMATURE GRANULOCYTE: ABNORMAL K/UL (ref 0–0.3)
ABSOLUTE LYMPH #: 1.4 K/UL (ref 1–4.8)
ABSOLUTE MONO #: 0.6 K/UL (ref 0–1)
ALBUMIN SERPL-MCNC: 4.6 G/DL (ref 3.5–5.2)
ALBUMIN/GLOBULIN RATIO: ABNORMAL (ref 1–2.5)
ALP BLD-CCNC: 90 U/L (ref 40–129)
ALT SERPL-CCNC: 33 U/L (ref 5–41)
ANION GAP SERPL CALCULATED.3IONS-SCNC: 21 MMOL/L (ref 9–17)
AST SERPL-CCNC: 14 U/L
BASOPHILS # BLD: 0 % (ref 0–2)
BASOPHILS ABSOLUTE: 0 K/UL (ref 0–0.2)
BILIRUB SERPL-MCNC: 0.37 MG/DL (ref 0.3–1.2)
BILIRUBIN DIRECT: <0.08 MG/DL
BILIRUBIN, INDIRECT: ABNORMAL MG/DL (ref 0–1)
BUN BLDV-MCNC: 60 MG/DL (ref 6–20)
CALCIUM SERPL-MCNC: 10.4 MG/DL (ref 8.6–10.4)
CHLORIDE BLD-SCNC: 91 MMOL/L (ref 98–107)
CO2: 23 MMOL/L (ref 20–31)
CREAT SERPL-MCNC: 14.25 MG/DL (ref 0.7–1.2)
DIFFERENTIAL TYPE: YES
EKG ATRIAL RATE: 55 BPM
EKG P AXIS: -2 DEGREES
EKG P-R INTERVAL: 164 MS
EKG Q-T INTERVAL: 450 MS
EKG QRS DURATION: 84 MS
EKG QTC CALCULATION (BAZETT): 430 MS
EKG R AXIS: -14 DEGREES
EKG T AXIS: 34 DEGREES
EKG VENTRICULAR RATE: 55 BPM
EOSINOPHILS RELATIVE PERCENT: 1 % (ref 0–5)
GFR AFRICAN AMERICAN: 5 ML/MIN
GFR NON-AFRICAN AMERICAN: 4 ML/MIN
GFR SERPL CREATININE-BSD FRML MDRD: ABNORMAL ML/MIN/{1.73_M2}
GFR SERPL CREATININE-BSD FRML MDRD: ABNORMAL ML/MIN/{1.73_M2}
GLUCOSE BLD-MCNC: 120 MG/DL (ref 70–99)
HCT VFR BLD CALC: 41.4 % (ref 41–53)
HEMOGLOBIN: 13.8 G/DL (ref 13.5–17.5)
IMMATURE GRANULOCYTES: ABNORMAL %
LIPASE: 283 U/L (ref 13–60)
LYMPHOCYTES # BLD: 17 % (ref 13–44)
MCH RBC QN AUTO: 30.7 PG (ref 26–34)
MCHC RBC AUTO-ENTMCNC: 33.3 G/DL (ref 31–37)
MCV RBC AUTO: 92.1 FL (ref 80–100)
MONOCYTES # BLD: 6 % (ref 5–9)
NRBC AUTOMATED: ABNORMAL PER 100 WBC
PDW BLD-RTO: 16.3 % (ref 12.1–15.2)
PLATELET # BLD: 211 K/UL (ref 140–450)
PLATELET ESTIMATE: ABNORMAL
PMV BLD AUTO: ABNORMAL FL (ref 6–12)
POTASSIUM SERPL-SCNC: 5.4 MMOL/L (ref 3.7–5.3)
RBC # BLD: 4.5 M/UL (ref 4.5–5.9)
RBC # BLD: ABNORMAL 10*6/UL
SEG NEUTROPHILS: 76 % (ref 39–75)
SEGMENTED NEUTROPHILS ABSOLUTE COUNT: 6.6 K/UL (ref 2.1–6.5)
SODIUM BLD-SCNC: 135 MMOL/L (ref 135–144)
TOTAL PROTEIN: 7.8 G/DL (ref 6.4–8.3)
TROPONIN INTERP: ABNORMAL
TROPONIN INTERP: ABNORMAL
TROPONIN T: ABNORMAL NG/ML
TROPONIN T: ABNORMAL NG/ML
TROPONIN, HIGH SENSITIVITY: 33 NG/L (ref 0–22)
TROPONIN, HIGH SENSITIVITY: 35 NG/L (ref 0–22)
WBC # BLD: 8.6 K/UL (ref 3.5–11)
WBC # BLD: ABNORMAL 10*3/UL

## 2022-02-21 PROCEDURE — 84484 ASSAY OF TROPONIN QUANT: CPT

## 2022-02-21 PROCEDURE — 6360000002 HC RX W HCPCS: Performed by: EMERGENCY MEDICINE

## 2022-02-21 PROCEDURE — 36415 COLL VENOUS BLD VENIPUNCTURE: CPT

## 2022-02-21 PROCEDURE — 93010 ELECTROCARDIOGRAM REPORT: CPT | Performed by: INTERNAL MEDICINE

## 2022-02-21 PROCEDURE — 83690 ASSAY OF LIPASE: CPT

## 2022-02-21 PROCEDURE — 82248 BILIRUBIN DIRECT: CPT

## 2022-02-21 PROCEDURE — 76705 ECHO EXAM OF ABDOMEN: CPT

## 2022-02-21 PROCEDURE — 93005 ELECTROCARDIOGRAM TRACING: CPT | Performed by: EMERGENCY MEDICINE

## 2022-02-21 PROCEDURE — 80053 COMPREHEN METABOLIC PANEL: CPT

## 2022-02-21 PROCEDURE — 85025 COMPLETE CBC W/AUTO DIFF WBC: CPT

## 2022-02-21 PROCEDURE — 96374 THER/PROPH/DIAG INJ IV PUSH: CPT

## 2022-02-21 PROCEDURE — 99284 EMERGENCY DEPT VISIT MOD MDM: CPT

## 2022-02-21 RX ORDER — FENTANYL CITRATE 50 UG/ML
50 INJECTION, SOLUTION INTRAMUSCULAR; INTRAVENOUS ONCE
Status: COMPLETED | OUTPATIENT
Start: 2022-02-21 | End: 2022-02-21

## 2022-02-21 RX ADMIN — FENTANYL CITRATE 50 MCG: 50 INJECTION, SOLUTION INTRAMUSCULAR; INTRAVENOUS at 06:33

## 2022-02-21 ASSESSMENT — ENCOUNTER SYMPTOMS
SORE THROAT: 0
COUGH: 0
BACK PAIN: 0
SHORTNESS OF BREATH: 0
EYE REDNESS: 0
DIARRHEA: 0
COLOR CHANGE: 0
ABDOMINAL PAIN: 1
EYE PAIN: 0
TROUBLE SWALLOWING: 0
VOMITING: 0
NAUSEA: 0

## 2022-02-21 ASSESSMENT — PAIN SCALES - GENERAL
PAINLEVEL_OUTOF10: 6
PAINLEVEL_OUTOF10: 2
PAINLEVEL_OUTOF10: 6

## 2022-02-21 NOTE — ED PROVIDER NOTES
[facility] ED  eMERGENCY dEPARTMENT eNCOUnter      Pt Name: Rosa Isela Zapata  MRN: [id number]  [place] 1975  Date of evaluation: 2/21/2022  Provider: Radha Zhong MD    CHIEF COMPLAINT       Chief Complaint   Patient presents with    Abdominal Pain     Having abdominal pain since last night around 11pm. Patient is a dialysis patient who gets dialysis mondays, wednesdays and fridays. Pt describes it as a pressure. No nausea vomitting or diarrhea. Patient is a 67-year-old male who presents to the emergency department complaining of abdominal pain that started around 11 PM.  Patient states that it is mostly in the upper and right side of his abdomen. He denies any fever or chills. He denies any vomiting or diarrhea. He states nothing is really making it better or worse. Patient is a hemodialysis patient and is scheduled for dialysis today. Nursing Notes were reviewed. REVIEW OF SYSTEMS    (2-9 systems for level 4, 10 or more for level 5)     Review of Systems   Constitutional: Negative for chills and fever. HENT: Negative for ear pain, sore throat and trouble swallowing. Eyes: Negative for pain and redness. Respiratory: Negative for cough and shortness of breath. Cardiovascular: Negative for chest pain and palpitations. Gastrointestinal: Positive for abdominal pain. Negative for diarrhea, nausea and vomiting. Genitourinary: Negative for dysuria and frequency. Musculoskeletal: Negative for back pain and neck pain. Skin: Negative for color change and rash. Neurological: Negative for dizziness, syncope and headaches. Psychiatric/Behavioral: Negative for hallucinations and suicidal ideas. Except as noted above the remainder of the review of systems was reviewed and negative.        PAST MEDICAL HISTORY     Past Medical History:   Diagnosis Date    End-stage renal disease on hemodialysis ([facility] [address]     since 2006, due to polycystic kidney disease    Hypertension  Hypertriglyceridemia          SURGICAL HISTORY       Past Surgical History:   Procedure Laterality Date    DIALYSIS FISTULA CREATION      THYROID LOBECTOMY  2012    [facility]         ALLERGIES     Sensipar  [cinacalcet]    FAMILY HISTORY     History reviewed. No pertinent family history. SOCIAL HISTORY       Social History     Socioeconomic History    Marital status:      Spouse name: None    Number of children: None    Years of education: None    Highest education level: None   Occupational History    None   Tobacco Use    Smoking status: Never Smoker    Smokeless tobacco: Never Used   Substance and Sexual Activity    Alcohol use: No     Alcohol/week: 0.0 standard drinks    Drug use: No    Sexual activity: None   Other Topics Concern    None   Social History Narrative    None     Social Determinants of Health     Financial Resource Strain:     Difficulty of Paying Living Expenses: Not on file   Food Insecurity:     Worried About Running Out of Food in the Last Year: Not on file    Richard of Food in the Last Year: Not on file   Transportation Needs:     Lack of Transportation (Medical): Not on file    Lack of Transportation (Non-Medical):  Not on file   Physical Activity:     Days of Exercise per Week: Not on file    Minutes of Exercise per Session: Not on file   Stress:     Feeling of Stress : Not on file   Social Connections:     Frequency of Communication with Friends and Family: Not on file    Frequency of Social Gatherings with Friends and Family: Not on file    Attends Hinduism Services: Not on file    Active Member of Clubs or Organizations: Not on file    Attends Club or Organization Meetings: Not on file    Marital Status: Not on file   Intimate Partner Violence:     Fear of Current or Ex-Partner: Not on file    Emotionally Abused: Not on file    Physically Abused: Not on file    Sexually Abused: Not on file   Housing Stability:     Unable to Pay for Housing in the Last Year: Not on file    Number of Places Lived in the Last Year: Not on file    Unstable Housing in the Last Year: Not on file           PHYSICAL EXAM    (up to 7 for level 4, 8 ormore for level 5)     ED Triage Vitals [02/21/22 0427]   BP Temp Temp Source Pulse Resp SpO2 Height Weight   (!) 186/100 98.8 °F (37.1 °C) Oral 62 20 98 % -- 162 lb (73.5 kg)       Physical Exam     Physical    Vital signs and nursing notes were reviewed as well as the social, family, and past medical history. Gen. appearance: Patient is alert and oriented and in no acute distress    Head: Atraumatic, normocephalic    Neck: Supple, trachea/thyroid normal    EENT: PERRLA, EOMI, conjunctiva normal.    Skin: Warm and dry with no rash    Cardiovascular: Heart RRR, no gallops or rubs, no aortic enlargement or bruits noted. Respiratory: Lungs clear, no wheezing, no rales, normal breath sounds. Gastrointestinal: Abdomen mildly tender in the right upper quadrant, bowel sounds normal, no rebound/guarding/distention or mass    Musculoskeletal: No tenderness in the extremities, no back or hip pain.     Neurological: Patient is alert and oriented ×3, no focal motor or sensory deficits noted      DIAGNOSTIC RESULTS             LABS:  Labs Reviewed   CBC WITH AUTO DIFFERENTIAL - Abnormal; Notable for the following components:       Result Value    RDW 16.3 (*)     Seg Neutrophils 76 (*)     Segs Absolute 6.60 (*)     All other components within normal limits   COMPREHENSIVE METABOLIC W/ BILI PROFILE W/ REFLEX TO MG - Abnormal; Notable for the following components:    BUN 60 (*)     CREATININE 14.25 (*)     Glucose 120 (*)     Potassium 5.4 (*)     Chloride 91 (*)     Anion Gap 21 (*)     GFR Non- 4 (*)     GFR  5 (*)     All other components within normal limits   TROPONIN - Abnormal; Notable for the following components:    Troponin, High Sensitivity 35 (*)     All other components within normal limits LIPASE - Abnormal; Notable for the following components:    Lipase 283 (*)     All other components within normal limits   TROPONIN - Abnormal; Notable for the following components:    Troponin, High Sensitivity 33 (*)     All other components within normal limits       All other labs were within normal range or not returned as of this dictation. EMERGENCY DEPARTMENT COURSE and DIFFERENTIAL DIAGNOSIS/MDM:   Vitals:    Vitals:    02/21/22 0427 02/21/22 0633   BP: (!) 186/100 (!) 154/88   Pulse: 62 61   Resp: 20 (!) 34   Temp: 98.8 °F (37.1 °C)    TempSrc: Oral    SpO2: 98% 98%   Weight: 162 lb (73.5 kg)                  REASSESSMENT     ED Course as of 02/21/22 0737   Mon Feb 21, 2022   0454 EKG shows normal sinus rhythm with a rate of 55 and no acute ischemic change. [JM]      ED Course User Index  [JM] Ronnie Hammond MD     Here in the ED patient's lipase was elevated. Patient's BUN and creatinine is 60 and 14.25 with a potassium of 5.4. Patient is due dialysis today. Given patient's right upper quadrant pain and elevated lipase patient will have an ultrasound this morning to evaluate for pancreatitis caused by biliary obstruction. Patient will be signed out to Dr. Lyndsay Morton pending results at 8 a.m. who will make final disposition. PROCEDURES:  Unless otherwise noted below, none     Procedures    FINAL IMPRESSION      1. Pain of upper abdomen    2. Other acute pancreatitis, unspecified complication status          DISPOSITION/PLAN   DISPOSITION        PATIENT REFERRED TO:  No follow-up provider specified.     DISCHARGE MEDICATIONS:  New Prescriptions    No medications on file          (Please note that portions ofthis note were completed with a voice recognition program.  Efforts were made to edit the dictations but occasionally words are mis-transcribed.)    Ronnie Hammond MD(electronically signed)  Attending Emergency Physician            Ronnie Hammond MD  02/21/22 7969

## 2022-02-21 NOTE — ED PROVIDER NOTES
Summation      Patient Course: Patient was turned over to me by Dr. Rojas Brown. Patient was seen earlier for right upper quadrant abdominal pain and elevated lipase. Patient scheduled for global ultrasound. Gallbladder ultrasound shows cholelithiasis with thickened gallbladder wall. Normal white blood cell count. Patient discussed with Mirlande Watson. Patient is discussed with Josh Morataya. Patient had normal stress test, normal EKG within the past year. Patient is cleared for cholecystectomy by Dr. Francisco White. Patient will be discharged to go to dialysis today. ED Medications administered this visit:    Medications   fentaNYL (SUBLIMAZE) injection 50 mcg (50 mcg IntraVENous Given 2/21/22 0041)       New Prescriptions from this visit:    New Prescriptions    No medications on file       Follow-up:  Bushra Acevedo MD  [address]  [phone]    Schedule an appointment as soon as possible for a visit on 2/24/2022  Return to ED if worse        Final Impression:   1. Other acute pancreatitis, unspecified complication status    2. Right upper quadrant abdominal pain    3. Biliary colic    4.  Calculus of gallbladder without cholecystitis without obstruction               (Please note that portions of this note were completed with a voice recognition program.  Efforts were made to edit the dictations but occasionally words are mis-transcribed.)      (Please note that portions of this note were completed with a voice recognition program.  Efforts were made to edit the dictations but occasionally words are mis-transcribed.)       Kel Manuel MD  02/21/22 3491

## 2022-02-21 NOTE — PROGRESS NOTES
Andreina from [employer] calls in and informs nurse that pt is supposed to be at dialysis at 0740 this AM. She states to tell patient if he is discharged before 1130 to go there for his treatment and if its after 1130 to call her to reschedule.  [phone]

## 2022-02-24 ENCOUNTER — TELEPHONE (OUTPATIENT)
Dept: FAMILY MEDICINE CLINIC | Age: 47
End: 2022-02-24

## 2022-02-24 ENCOUNTER — OFFICE VISIT (OUTPATIENT)
Dept: FAMILY MEDICINE CLINIC | Age: 47
End: 2022-02-24
Payer: MEDICARE

## 2022-02-24 ENCOUNTER — OFFICE VISIT (OUTPATIENT)
Dept: SURGERY | Age: 47
End: 2022-02-24
Payer: MEDICARE

## 2022-02-24 VITALS
DIASTOLIC BLOOD PRESSURE: 72 MMHG | RESPIRATION RATE: 20 BRPM | SYSTOLIC BLOOD PRESSURE: 100 MMHG | WEIGHT: 164.6 LBS | BODY MASS INDEX: 28.1 KG/M2 | HEART RATE: 76 BPM | OXYGEN SATURATION: 99 % | HEIGHT: 64 IN

## 2022-02-24 DIAGNOSIS — I73.9 CLAUDICATION OF LOWER EXTREMITY (HCC): ICD-10-CM

## 2022-02-24 DIAGNOSIS — N25.81 SECONDARY HYPERPARATHYROIDISM (HCC): ICD-10-CM

## 2022-02-24 DIAGNOSIS — N18.6 ESRD (END STAGE RENAL DISEASE) ON DIALYSIS (HCC): ICD-10-CM

## 2022-02-24 DIAGNOSIS — K80.20 SYMPTOMATIC CHOLELITHIASIS: Primary | ICD-10-CM

## 2022-02-24 DIAGNOSIS — I10 ESSENTIAL HYPERTENSION: Primary | ICD-10-CM

## 2022-02-24 DIAGNOSIS — Z98.890 HISTORY OF THYROID SURGERY: ICD-10-CM

## 2022-02-24 DIAGNOSIS — Z99.2 ESRD (END STAGE RENAL DISEASE) ON DIALYSIS (HCC): ICD-10-CM

## 2022-02-24 DIAGNOSIS — Z78.9 USES SPANISH AS PRIMARY SPOKEN LANGUAGE: ICD-10-CM

## 2022-02-24 DIAGNOSIS — I10 UNCONTROLLED HYPERTENSION: ICD-10-CM

## 2022-02-24 PROCEDURE — G8428 CUR MEDS NOT DOCUMENT: HCPCS | Performed by: SURGERY

## 2022-02-24 PROCEDURE — 1036F TOBACCO NON-USER: CPT | Performed by: STUDENT IN AN ORGANIZED HEALTH CARE EDUCATION/TRAINING PROGRAM

## 2022-02-24 PROCEDURE — G8427 DOCREV CUR MEDS BY ELIG CLIN: HCPCS | Performed by: STUDENT IN AN ORGANIZED HEALTH CARE EDUCATION/TRAINING PROGRAM

## 2022-02-24 PROCEDURE — 99214 OFFICE O/P EST MOD 30 MIN: CPT | Performed by: STUDENT IN AN ORGANIZED HEALTH CARE EDUCATION/TRAINING PROGRAM

## 2022-02-24 PROCEDURE — G8419 CALC BMI OUT NRM PARAM NOF/U: HCPCS | Performed by: STUDENT IN AN ORGANIZED HEALTH CARE EDUCATION/TRAINING PROGRAM

## 2022-02-24 PROCEDURE — 99203 OFFICE O/P NEW LOW 30 MIN: CPT | Performed by: SURGERY

## 2022-02-24 PROCEDURE — G8484 FLU IMMUNIZE NO ADMIN: HCPCS | Performed by: STUDENT IN AN ORGANIZED HEALTH CARE EDUCATION/TRAINING PROGRAM

## 2022-02-24 PROCEDURE — G8419 CALC BMI OUT NRM PARAM NOF/U: HCPCS | Performed by: SURGERY

## 2022-02-24 PROCEDURE — 1036F TOBACCO NON-USER: CPT | Performed by: SURGERY

## 2022-02-24 PROCEDURE — G8484 FLU IMMUNIZE NO ADMIN: HCPCS | Performed by: SURGERY

## 2022-02-24 SDOH — ECONOMIC STABILITY: FOOD INSECURITY: WITHIN THE PAST 12 MONTHS, YOU WORRIED THAT YOUR FOOD WOULD RUN OUT BEFORE YOU GOT MONEY TO BUY MORE.: NEVER TRUE

## 2022-02-24 SDOH — ECONOMIC STABILITY: FOOD INSECURITY: WITHIN THE PAST 12 MONTHS, THE FOOD YOU BOUGHT JUST DIDN'T LAST AND YOU DIDN'T HAVE MONEY TO GET MORE.: NEVER TRUE

## 2022-02-24 ASSESSMENT — ENCOUNTER SYMPTOMS
BACK PAIN: 0
DIARRHEA: 0
SORE THROAT: 0
SINUS PAIN: 0
WHEEZING: 0
VOMITING: 0
COUGH: 0
ABDOMINAL PAIN: 0
NAUSEA: 0

## 2022-02-24 ASSESSMENT — SOCIAL DETERMINANTS OF HEALTH (SDOH): HOW HARD IS IT FOR YOU TO PAY FOR THE VERY BASICS LIKE FOOD, HOUSING, MEDICAL CARE, AND HEATING?: NOT HARD AT ALL

## 2022-02-24 NOTE — TELEPHONE ENCOUNTER
Called [employer] manager  in regards to missing FMLA papers. That we have faxed everything that we have received. Left VM to call me back   Will verify fax we have on file and to ensure they have our fax number correct  and offer my email if there is some type of fax miscommunication   [email or web address]     FAX -[phone]    EMAIL- [email or web address]

## 2022-02-24 NOTE — PROGRESS NOTES
HPI Notes    Name: Janie Albert  : 1975         Chief Complaint:     Chief Complaint   Patient presents with    Hypertension    Other     End stage renal disease on dialysis       History of Present Illness:      HPI    This is a 40year old man with ESRD on HD, HTN, bilateral LE pain. He speaks primarily Telugu, thus, the interview was conducted using  services. Our 's name was Danay Sin, and their identification number was [id number]. There are several problems he wishes to discuss with me today. He reports that he has actually been terminated from his place of employment \"because they have not received any of the [facility] paperwork\". He is requesting that we notify his employer that he has been receiving treatment so that he can keep his job. LE discomfort: he reports this problem has completely resolved with our previous intervention. He feels well and wants to resume working. ESRD: still undergoing MWF HD, this is unchanged. He is otherwise doing well. Biliary colic: He has a appointment later today with general surgery to discuss elective cholecystectomy. HTN: well controlled, no change from previous therapy.     Past Medical History:     Past Medical History:   Diagnosis Date    End-stage renal disease on hemodialysis ([facility] [address]     since , due to polycystic kidney disease    Hypertension     Hypertriglyceridemia       Reviewed all health maintenance requirements and ordered appropriate tests  Health Maintenance Due   Topic Date Due    HIV screen  Never done    Hepatitis B vaccine (1 of 3 - Risk Recombivax 3-dose series) Never done    DTaP/Tdap/Td vaccine (1 - Tdap) Never done    Diabetes screen  2019    Colorectal Cancer Screen  Never done       Past Surgical History:     Past Surgical History:   Procedure Laterality Date    DIALYSIS FISTULA CREATION      THYROID LOBECTOMY      [facility]        Medications:       Prior to Admission medications Medication Sig Start Date End Date Taking? Authorizing Provider   lanthanum (FOSRENOL) 1000 MG chewable tablet Take 1 tablet by mouth daily 6/18/21  Yes Historical Provider, MD   Sucroferric Oxyhydroxide (VELPHORO) 500 MG CHEW Take 2 tablets by mouth 2/12/21  Yes Historical Provider, MD   metoprolol succinate (TOPROL XL) 100 MG extended release tablet TAKE 1 TABLET BY MOUTH EVERY DAY 3/16/21  Yes Brit Choe DO   amLODIPine (NORVASC) 10 MG tablet TAKE 1 TABLET BY MOUTH EVERY DAY 3/16/21  Yes Brit Choe DO   lidocaine-prilocaine (EMLA) 2.5-2.5 % cream APPLY SMALL AMOUNT TO ACCESS SITE (AVF) 1 HOUR BEFORE DIALYSIS. COVER WITH OCCLUSIVE DRESSING (SARAN WRAP) 11/30/20  Yes Historical Provider, MD   ergocalciferol (ERGOCALCIFEROL) 1.25 MG (73563 UT) capsule TAKE 1 CAPSULE BY MOUTH EVERY 2 WEEKS 11/30/20  Yes Historical Provider, MD   hydrALAZINE (APRESOLINE) 50 MG tablet Take 1 tablet by mouth 2 times daily 11/19/19  Yes Brit Choe DO   sevelamer (RENVELA) 800 MG tablet Take 1 tablet by mouth 3 times daily (with meals)   Yes Historical Provider, MD   gabapentin (NEURONTIN) 100 MG capsule Radha josseline capsula por via oral ajit veces a la semana despues de la dialisis. 11/17/21 2/17/22  Memo Ross DO        Allergies:       Sensipar  [cinacalcet]    Social History:     Tobacco:    reports that he has never smoked. He has never used smokeless tobacco.  Alcohol:      reports no history of alcohol use. Drug Use:  reports no history of drug use. Family History:     No family history on file. Review of Systems:     Review of Systems   Constitutional: Negative for fever. HENT: Negative for sinus pain, sneezing and sore throat. Respiratory: Negative for cough and wheezing. Cardiovascular: Negative for chest pain. Gastrointestinal: Negative for abdominal pain, diarrhea, nausea and vomiting. Musculoskeletal: Negative for back pain. Skin: Negative for rash.    Hematological: Negative for adenopathy. Psychiatric/Behavioral: Negative for sleep disturbance. Physical Exam:     Vitals:  /72 (Site: Right Upper Arm, Position: Sitting, Cuff Size: Medium Adult)   Pulse 76   Resp 20   Ht 5' 4\" (1.626 m)   Wt 164 lb 9.6 oz (74.7 kg)   SpO2 99%   BMI 28.25 kg/m²       Physical Exam  Vitals and nursing note reviewed. Constitutional:       General: He is not in acute distress. Appearance: Normal appearance. Cardiovascular:      Rate and Rhythm: Normal rate and regular rhythm. Pulses: Normal pulses. Heart sounds: Normal heart sounds. No murmur heard. Comments: AV fistula in L. Forearm. Pulmonary:      Effort: Pulmonary effort is normal. No respiratory distress. Breath sounds: Normal breath sounds. No wheezing or rhonchi. Musculoskeletal:         General: No swelling or tenderness. Normal range of motion. Skin:     General: Skin is warm and dry. Capillary Refill: Capillary refill takes less than 2 seconds. Fistula with palpable thrill LUE. Scarring present. Neurological:      General: No focal deficit present. Mental Status: He is alert and oriented to person, place, and time. Mental status is at baseline. Psychiatric:         Mood and Affect: Mood normal.         Behavior: Behavior normal.         Thought Content:  Thought content normal.                 Data:     Lab Results   Component Value Date     02/21/2022    K 5.4 02/21/2022    CL 91 02/21/2022    CO2 23 02/21/2022    BUN 60 02/21/2022    CREATININE 14.25 02/21/2022    GLUCOSE 120 02/21/2022    PROT 7.8 02/21/2022    LABALBU 4.6 02/21/2022    BILITOT 0.37 02/21/2022    ALKPHOS 90 02/21/2022    AST 14 02/21/2022    ALT 33 02/21/2022     Lab Results   Component Value Date    WBC 8.6 02/21/2022    RBC 4.50 02/21/2022    HGB 13.8 02/21/2022    HCT 41.4 02/21/2022    MCV 92.1 02/21/2022    MCH 30.7 02/21/2022    MCHC 33.3 02/21/2022    RDW 16.3 02/21/2022     02/21/2022    MPV NOT REPORTED 02/21/2022     Lab Results   Component Value Date    TSH 2.40 08/24/2021     Lab Results   Component Value Date    CHOL 153 02/09/2019    HDL 39 02/09/2019    LABA1C 4.6 09/20/2016          Assessment & Plan        Diagnosis Orders   1. Essential hypertension     2. ESRD (end stage renal disease) on dialysis ([facility] [address]     3. Claudication of lower extremity ([facility] [address]     4. Secondary hyperparathyroidism ([facility] [address]         1.  Well-controlled, continue current therapy. 2.  Continue 3 day/week HD. 3.  Resolved. 4.  Moderate being monitored during hemodialysis. We will send his employer a letter stating that he has been under my care, and ascertain both his employer's contact information and what documentation they require for him to keep his job. I had filled out several [facility] paperwork through his employer last calendar year, we can send these again if needed. Follow up in six months for well visit. Completed Refills   Requested Prescriptions      No prescriptions requested or ordered in this encounter     No follow-ups on file. No orders of the defined types were placed in this encounter. No orders of the defined types were placed in this encounter. Patient Instructions     SURVEY:    You may be receiving a survey from [employer] regarding your visit today. Please complete the survey to enable us to provide the highest quality of care to you and your family. If you cannot score us a very good on any question, please call the office to discuss how we could of made your experience a very good one. Thank you.       Clinical Care Team:     Dr. Horacio Sams SHIVANI      ClericalTeam:     Janet Chong      Electronically signed by Julian Dela Cruz DO on 2/24/2022 at 9:31 AM           Completed Refills   Requested Prescriptions      No prescriptions requested or ordered in this encounter         Radha Flores received counseling on the following healthy behaviors: nutrition, exercise and medication adherence  Reviewed prior labs and health maintenance. Continue current medications, diet and exercise. Discussed use, benefit, and side effects of prescribed medications. Barriers to medication compliance addressed. Patient given educational materials - see patient instructions. All patient questions answered. Patient voiced understanding.

## 2022-02-24 NOTE — LETTER
To whom it may concern:    I am writing on behalf of of Mr. Maritza Mcqueen, who is a patient of mine. I have been seeing him for some months now to treat some of his medical problems. He informed me that as his employer, you are in need of documentation detailing time off from work due to his medical problems. Over the past several months, I have filled out multiple FMLA forms for Mr. Daren Holbrook. I would be happy to fill out more forms if that is something that you require. If Mr. Daren Holbrook is comfortable with doing so, I can also provide you office visit notes detailing treatment plans and medical problems. Secondly, Mr. Daren Holbrook wanted us to confirm your contact information; we have a contact phone number for you listed as [phone] and a fax number listed as [phone]. Our email contact for you is [email or web address]. Kindly reply to our earlier phone call or this fax confirming your contact information so that we can continue to assist Mr. Daren Holbrook in seeking employment at your facility. Our contact information is phone number: [phone], fax number [phone]. We will also email my medical assistant, Terry at [email or web address]    Sincerely,      Isela Danielle DO  2/24/2022  3:50 PM

## 2022-02-24 NOTE — LETTER
[facility] Jaime [address] [place] [id number]  Phone: [phone]  Fax: [address], DO        February 24, 2022     Patient: Rosa Isela Zapata   YOB: 1975   Date of Visit: 2/24/2022       To Whom It May Concern: It is my medical opinion that Rosa Isela Zapata may return to full duty immediately with no restrictions. Please excuse any absences from 11/01/2021 until today when he was under my care. I had completed [facility] paperwork for him; we will be sending these to his employer as well. Should you require any documentation of our medical visits or treatment plans, I would be happy to provide those if Mr. Terrie Weir is comfortable sharing that information. If you have any questions or concerns, please don't hesitate to call.     Sincerely,          Micaela Sanchez, DO

## 2022-02-24 NOTE — PATIENT INSTRUCTIONS
SURVEY:    You may be receiving a survey from [employer] regarding your visit today. Please complete the survey to enable us to provide the highest quality of care to you and your family. If you cannot score us a very good on any question, please call the office to discuss how we could of made your experience a very good one. Thank you.       Clinical Care Team:     Dr. Christine Coyne, MASON      [facility]     [id number] [facility]

## 2022-02-24 NOTE — PROGRESS NOTES
Kimi Rowland MD  General Surgery, Endoscopy  Chief Medical Officer    [facility] Mely Uli  [address] [address]  Dept: [phone]  Fax: [phone]    CHIEF COMPLAINT  No chief complaint on file. HPI    Mr Muriel Corey is a very pleasant 52-year-old Latvian-speaking male presenting for evaluation of symptomatic cholelithiasis. He has had a long history of postprandial abdominal pain, mostly right upper quadrant, with nausea. Gallbladder ultrasound February 21, 2022 showing hepatic steatosis, cholelithiasis without cholecystitis nor biliary obstruction, and polycystic kidney disease. His kidney disease was evaluated with MRI November 2021 showing innumerable bilateral renal cysts and renal enlargement. He is now end-stage renal disease requiring hemodialysis since 2006. No prior abdominal surgery. Thyroid lobectomy 2012. No recent weight changes, BMI 27. No cough, fever nor other respiratory symptoms. Daily bowel movements, formed and brown, without blood. No family history of malignancy to his knowledge. Patient has never smoked. Review of Systems   Constitutional: Negative for activity change, appetite change, chills, fever and unexpected weight change. HENT: Negative for nosebleeds, sneezing, sore throat and trouble swallowing. Eyes: Negative for visual disturbance. Respiratory: Negative for cough, choking and shortness of breath. Cardiovascular: Negative for chest pain, palpitations and leg swelling. Gastrointestinal: Positive for abdominal distention and abdominal pain (RUQ). Negative for blood in stool, nausea and vomiting. Genitourinary: Negative for dysuria, flank pain and hematuria. Musculoskeletal: Positive for arthralgias. Negative for back pain, gait problem and myalgias. Allergic/Immunologic: Negative for immunocompromised state. Neurological: Negative for dizziness, seizures, syncope, weakness and headaches. Hematological: Does not bruise/bleed easily. Psychiatric/Behavioral: Negative for confusion and sleep disturbance. Past Medical History:   Diagnosis Date    End-stage renal disease on hemodialysis ([address]     since 2006, due to polycystic kidney disease    Hypertension     Hypertriglyceridemia        Past Surgical History:   Procedure Laterality Date    DIALYSIS FISTULA CREATION      THYROID LOBECTOMY  2012    [facility]       No family history on file. Allergies:  See list    Current Outpatient Medications   Medication Sig Dispense Refill    gabapentin (NEURONTIN) 100 MG capsule Radha josseline capsula por via oral ajit veces a la semana despues de la dialisis. 30 capsule 2    lanthanum (FOSRENOL) 1000 MG chewable tablet Take 1 tablet by mouth daily      Sucroferric Oxyhydroxide (VELPHORO) 500 MG CHEW Take 2 tablets by mouth      metoprolol succinate (TOPROL XL) 100 MG extended release tablet TAKE 1 TABLET BY MOUTH EVERY DAY 90 tablet 3    amLODIPine (NORVASC) 10 MG tablet TAKE 1 TABLET BY MOUTH EVERY DAY 90 tablet 3    lidocaine-prilocaine (EMLA) 2.5-2.5 % cream APPLY SMALL AMOUNT TO ACCESS SITE (AVF) 1 HOUR BEFORE DIALYSIS. COVER WITH OCCLUSIVE DRESSING (SARAN WRAP)      ergocalciferol (ERGOCALCIFEROL) 1.25 MG (22368 UT) capsule TAKE 1 CAPSULE BY MOUTH EVERY 2 WEEKS      hydrALAZINE (APRESOLINE) 50 MG tablet Take 1 tablet by mouth 2 times daily 60 tablet 5    sevelamer (RENVELA) 800 MG tablet Take 1 tablet by mouth 3 times daily (with meals)       No current facility-administered medications for this visit. Social History     Socioeconomic History    Marital status:      Spouse name: Not on file    Number of children: Not on file    Years of education: Not on file    Highest education level: Not on file   Occupational History    Not on file   Tobacco Use    Smoking status: Never Smoker    Smokeless tobacco: Never Used   Substance and Sexual Activity    Alcohol use:  No Alcohol/week: 0.0 standard drinks    Drug use: No    Sexual activity: Not on file   Other Topics Concern    Not on file   Social History Narrative    Not on file     Social Determinants of Health     Financial Resource Strain: Low Risk     Difficulty of Paying Living Expenses: Not hard at all   Food Insecurity: No Food Insecurity    Worried About [address] in the Last Year: Never true    [address] in the Last Year: Never true   Transportation Needs:     Lack of Transportation (Medical): Not on file    Lack of Transportation (Non-Medical): Not on file   Physical Activity:     Days of Exercise per Week: Not on file    Minutes of Exercise per Session: Not on file   Stress:     Feeling of Stress : Not on file   Social Connections:     Frequency of Communication with Friends and Family: Not on file    Frequency of Social Gatherings with Friends and Family: Not on file    Attends Hinduism Services: Not on file    Active Member of [address] [place] [address] or Organizations: Not on file    Attends Club or Organization Meetings: Not on file    Marital Status: Not on file   Intimate Partner Violence:     Fear of Current or Ex-Partner: Not on file    Emotionally Abused: Not on file    Physically Abused: Not on file    Sexually Abused: Not on file   Housing Stability:     Unable to Pay for Housing in the Last Year: Not on file    Number of [place] in the Last Year: Not on file    Unstable Housing in the Last Year: Not on file       There were no vitals taken for this visit. Physical Exam  Vitals and nursing note reviewed. Constitutional:       Appearance: He is well-developed. HENT:      Head: Normocephalic and atraumatic. Eyes:      General: No scleral icterus. Conjunctiva/sclera: Conjunctivae normal.      Pupils: Pupils are equal, round, and reactive to light. Neck:      Vascular: No JVD. Trachea: No tracheal deviation.    Cardiovascular:      Rate and Rhythm: Normal rate and regular rhythm. Pulmonary:      Effort: Pulmonary effort is normal. No respiratory distress. Chest:      Chest wall: No tenderness. Abdominal:      General: There is no distension. Palpations: Abdomen is soft. There is no mass. Tenderness: There is no abdominal tenderness. There is no guarding or rebound. Musculoskeletal:         General: Normal range of motion. Cervical back: Normal range of motion and neck supple. Lymphadenopathy:      Cervical: No cervical adenopathy. Skin:     General: Skin is warm and dry. Findings: No erythema or rash. Neurological:      Mental Status: He is alert and oriented to person, place, and time. Cranial Nerves: No cranial nerve deficit. Psychiatric:         Behavior: Behavior normal.         Thought Content: Thought content normal.         Judgment: Judgment normal.         IMAGING/LABS    US GALLBLADDER RUQ    Status: Final result       Order Providers    Authorizing Billing   MD Alex Fontenot MD            Signed by    Signed Date/Time Phone Pager   Shen Lemon 2/21/2022 11:17 [phone]      Reading Providers    Read Date Phone Pager   Paulie Fierro Feb 21, 2022  9:46 [phone]      US GALLBLADDER RUQ: Patient Communication    Not Released Not seen     Routing History    Priority Sent On From To Message Type    2/21/2022  9:46 AM Chema, [facility] Incoming Radiant Results From [facility] F/U Results     Radiation Dose Estimates    No radiation information found for this patient  Narrative   HISTORY: Right upper quadrant abdominal pain           TECHNIQUE: Transabdominal ultrasound was performed of the right upper quadrant.       COMPARISON: CT scan 10/21/2021           FINDINGS: Liver demonstrates fatty infiltration and is normal in size. No    masses or biliary ductal dilatation is seen. Common bile duct measures 4 mm. There is cholelithiasis but no evidence for acute cholecystitis.  Pancreas is    partially obscured. The kidney is enlarged measuring 15.3 cm on the right    secondary to autosomal dominant polycystic kidney disease. No ascites is seen. Aorta is unremarkable.               Impression       1. Fatty liver.       2. Cholelithiasis. No acute process.       3.  Autosomal dominant polycystic kidney disease.             Contains critical data Comprehensive Metabolic w/ Bili Profile w/ Reflex to MG  Order: [id number]   Status: Final result     Visible to patient: No (not released)     Next appt: 08/24/2022 at 10:20 AM in [facility] )     0 Result Notes    Component Ref Range & Units 2/21/22 0441 8/24/21 0906 5/3/21 1210 12/26/20 1658 2/9/19 0908 11/30/16 2259 11/30/16 2259   Albumin 3.5 - 5.2 g/dL 4.6   5.1  3.8  4.6  4.8 [facility]     Albumin/Globulin Ratio 1.0 - 2.5 NOT REPORTED   NOT REPORTED  NOT REPORTED  NOT REPORTED  NOT REPORTED [1]     Alkaline Phosphatase 40 - 129 U/L 90   100  54  75  73 [facility]     ALT 5 - 41 U/L 33   12  13  17  11 [facility]     AST <40 U/L 14   15  13  11  11 [facility]     Total Bilirubin 0.30 - 1.20 mg/dL 0.37   0.36  0.26 Low   0.59  0.34 [facility]     Bilirubin, Direct <0.31 mg/dL <0.08      <0.08 [facility]     Bilirubin, Indirect 0.00 - 1.00 mg/dL Can not be calculated      CANNOT BE CALCULATED [facility]     BUN 6 - 20 mg/dL 60 High   55 High   33 High   56 High   37 High    25 High     Calcium 8.6 - 10.4 mg/dL 10.4  8.5 Low   9.1  8.4 Low   9.7   9.2    CREATININE 0.70 - 1.20 mg/dL 14.25 High Panic   10.94 High Panic   7.08 High Panic   14.92 High Panic   9.68 High Panic    6.51 High Panic     Glucose 70 - 99 mg/dL 120 High   94  97  99  96   106 High     Total Protein 6.4 - 8.3 g/dL 7.8   8.6 High   7.0  9.1 High   8.1 [facility]     Sodium 135 - 144 mmol/L 135  134 Low   140  136  137   140    Potassium 3.7 - 5.3 mmol/L 5.4 High   5.1  4.0  6.9 High Panic   5.1   3.9    Chloride 98 - 107 mmol/L 91 Low   91 Low   96 Low   90 Low   93 Low  21  11 Low   23  20 [facility]    Monocytes 5 - 9 % 6  9   8  10 High   8  9 [facility]    Eosinophils % 0 - 5 % 1  1   3  1  2  2 [facility]    Basophils 0 - 2 % 0  1   1  0  1  1 [facility]    Immature Granulocytes 0 % NOT REPORTED  NOT REPORTED   NOT REPORTED  NOT REPORTED  NOT REPORTED     Segs Absolute 2.1 - 6.5 k/uL 6.60 High   5.30   5.00  4.30  4.20  5.90 [facility]    Absolute Lymph # 1.0 - 4.8 k/uL 1.40  1.30   1.60  0.60 Low   1.40  1.70 [facility]    Absolute Mono # 0.0 - 1.0 k/uL 0.60  0.70   0.60  0.60  0.50  0.80 [facility]             ASSESSMENT     Diagnosis Orders   1. Symptomatic cholelithiasis     2. ESRD (end stage renal disease) on dialysis ([facility] [address]     3. Uncontrolled hypertension     4. History of thyroid surgery     5. BMI 28.0-28.9,adult     6. Uses Slovenian as primary spoken language         PLAN    Discussed at length with Mr. Alyson Foote via Slovenian/English  his history of postprandial abdominal pain and nausea with work-up revealing gallstones, as well as his history of end-stage renal disease with polycystic kidneys, dialysis requirements, etc.  We will proceed with elective laparoscopic cholecystectomy, robotic assist.  Risks, benefits, alternatives thoroughly reviewed and accepted by Mr. Alyson Foote, including bleeding, infection, bowel/bile duct injury, COVID-19 exposure/infection, etc.  [place], low-fat diet for now. We will coordinate scheduling of cholecystectomy with nephrology for appropriate dialysis timing. Patient conveys clear understanding and is in agreement.      Franklin Snyder MD

## 2022-03-09 ENCOUNTER — TELEPHONE (OUTPATIENT)
Dept: SURGERY | Age: 47
End: 2022-03-09

## 2022-04-01 NOTE — PROGRESS NOTES
MICHAEL Aceves, CRNA notified regarding patient's renal medications and dialysis schedule. Suggested patient receive dialysis day prior to surgery and hold renal medications until after surgery.

## 2022-04-12 ENCOUNTER — ANESTHESIA EVENT (OUTPATIENT)
Dept: OPERATING ROOM | Age: 47
End: 2022-04-12
Payer: MEDICARE

## 2022-04-13 ENCOUNTER — ANESTHESIA (OUTPATIENT)
Dept: OPERATING ROOM | Age: 47
End: 2022-04-13
Payer: MEDICARE

## 2022-04-13 ENCOUNTER — APPOINTMENT (OUTPATIENT)
Dept: GENERAL RADIOLOGY | Age: 47
End: 2022-04-13
Attending: SURGERY
Payer: MEDICARE

## 2022-04-13 ENCOUNTER — HOSPITAL ENCOUNTER (OUTPATIENT)
Age: 47
Setting detail: OUTPATIENT SURGERY
Discharge: HOME | End: 2022-04-13
Attending: SURGERY | Admitting: SURGERY
Payer: MEDICARE

## 2022-04-13 VITALS
TEMPERATURE: 97.6 F | HEART RATE: 76 BPM | HEIGHT: 64 IN | DIASTOLIC BLOOD PRESSURE: 89 MMHG | RESPIRATION RATE: 18 BRPM | BODY MASS INDEX: 27.83 KG/M2 | OXYGEN SATURATION: 96 % | SYSTOLIC BLOOD PRESSURE: 171 MMHG | WEIGHT: 163 LBS

## 2022-04-13 VITALS — OXYGEN SATURATION: 100 % | SYSTOLIC BLOOD PRESSURE: 122 MMHG | DIASTOLIC BLOOD PRESSURE: 84 MMHG

## 2022-04-13 DIAGNOSIS — Z90.49 S/P LAPAROSCOPIC CHOLECYSTECTOMY: Primary | ICD-10-CM

## 2022-04-13 PROBLEM — K80.20 SYMPTOMATIC CHOLELITHIASIS: Status: ACTIVE | Noted: 2022-04-13

## 2022-04-13 PROCEDURE — 6360000002 HC RX W HCPCS: Performed by: SURGERY

## 2022-04-13 PROCEDURE — 6370000000 HC RX 637 (ALT 250 FOR IP): Performed by: SURGERY

## 2022-04-13 PROCEDURE — 2709999900 HC NON-CHARGEABLE SUPPLY: Performed by: SURGERY

## 2022-04-13 PROCEDURE — 3700000001 HC ADD 15 MINUTES (ANESTHESIA): Performed by: SURGERY

## 2022-04-13 PROCEDURE — 64488 TAP BLOCK BI INJECTION: CPT | Performed by: NURSE ANESTHETIST, CERTIFIED REGISTERED

## 2022-04-13 PROCEDURE — 88304 TISSUE EXAM BY PATHOLOGIST: CPT

## 2022-04-13 PROCEDURE — 2500000003 HC RX 250 WO HCPCS: Performed by: NURSE ANESTHETIST, CERTIFIED REGISTERED

## 2022-04-13 PROCEDURE — 6370000000 HC RX 637 (ALT 250 FOR IP): Performed by: NURSE ANESTHETIST, CERTIFIED REGISTERED

## 2022-04-13 PROCEDURE — 6360000002 HC RX W HCPCS: Performed by: NURSE ANESTHETIST, CERTIFIED REGISTERED

## 2022-04-13 PROCEDURE — 7100000010 HC PHASE II RECOVERY - FIRST 15 MIN: Performed by: SURGERY

## 2022-04-13 PROCEDURE — 2500000003 HC RX 250 WO HCPCS: Performed by: SURGERY

## 2022-04-13 PROCEDURE — 3600000009 HC SURGERY ROBOT BASE: Performed by: SURGERY

## 2022-04-13 PROCEDURE — 3600000019 HC SURGERY ROBOT ADDTL 15MIN: Performed by: SURGERY

## 2022-04-13 PROCEDURE — A4216 STERILE WATER/SALINE, 10 ML: HCPCS | Performed by: NURSE ANESTHETIST, CERTIFIED REGISTERED

## 2022-04-13 PROCEDURE — S2900 ROBOTIC SURGICAL SYSTEM: HCPCS | Performed by: SURGERY

## 2022-04-13 PROCEDURE — 3700000000 HC ANESTHESIA ATTENDED CARE: Performed by: SURGERY

## 2022-04-13 PROCEDURE — 2580000003 HC RX 258: Performed by: SURGERY

## 2022-04-13 PROCEDURE — 7100000011 HC PHASE II RECOVERY - ADDTL 15 MIN: Performed by: SURGERY

## 2022-04-13 PROCEDURE — 2580000003 HC RX 258: Performed by: NURSE ANESTHETIST, CERTIFIED REGISTERED

## 2022-04-13 PROCEDURE — 7100000001 HC PACU RECOVERY - ADDTL 15 MIN: Performed by: SURGERY

## 2022-04-13 PROCEDURE — 7100000000 HC PACU RECOVERY - FIRST 15 MIN: Performed by: SURGERY

## 2022-04-13 RX ORDER — SODIUM CHLORIDE, SODIUM LACTATE, POTASSIUM CHLORIDE, CALCIUM CHLORIDE 600; 310; 30; 20 MG/100ML; MG/100ML; MG/100ML; MG/100ML
INJECTION, SOLUTION INTRAVENOUS CONTINUOUS
Status: DISCONTINUED | OUTPATIENT
Start: 2022-04-13 | End: 2022-04-13 | Stop reason: HOSPADM

## 2022-04-13 RX ORDER — PROPOFOL 10 MG/ML
INJECTION, EMULSION INTRAVENOUS PRN
Status: DISCONTINUED | OUTPATIENT
Start: 2022-04-13 | End: 2022-04-13 | Stop reason: SDUPTHER

## 2022-04-13 RX ORDER — FENTANYL CITRATE 50 UG/ML
INJECTION, SOLUTION INTRAMUSCULAR; INTRAVENOUS PRN
Status: DISCONTINUED | OUTPATIENT
Start: 2022-04-13 | End: 2022-04-13 | Stop reason: SDUPTHER

## 2022-04-13 RX ORDER — OXYCODONE HYDROCHLORIDE 5 MG/1
5 TABLET ORAL
Status: COMPLETED | OUTPATIENT
Start: 2022-04-13 | End: 2022-04-13

## 2022-04-13 RX ORDER — LIDOCAINE HYDROCHLORIDE 20 MG/ML
INJECTION, SOLUTION EPIDURAL; INFILTRATION; INTRACAUDAL; PERINEURAL PRN
Status: DISCONTINUED | OUTPATIENT
Start: 2022-04-13 | End: 2022-04-13 | Stop reason: SDUPTHER

## 2022-04-13 RX ORDER — SODIUM CHLORIDE, SODIUM LACTATE, POTASSIUM CHLORIDE, CALCIUM CHLORIDE 600; 310; 30; 20 MG/100ML; MG/100ML; MG/100ML; MG/100ML
INJECTION, SOLUTION INTRAVENOUS CONTINUOUS
Status: CANCELLED | OUTPATIENT
Start: 2022-04-13

## 2022-04-13 RX ORDER — SODIUM CHLORIDE 0.9 % (FLUSH) 0.9 %
5-40 SYRINGE (ML) INJECTION PRN
Status: DISCONTINUED | OUTPATIENT
Start: 2022-04-13 | End: 2022-04-13 | Stop reason: HOSPADM

## 2022-04-13 RX ORDER — GINSENG 100 MG
CAPSULE ORAL PRN
Status: DISCONTINUED | OUTPATIENT
Start: 2022-04-13 | End: 2022-04-13 | Stop reason: ALTCHOICE

## 2022-04-13 RX ORDER — SODIUM CHLORIDE 9 MG/ML
INJECTION INTRAVENOUS PRN
Status: DISCONTINUED | OUTPATIENT
Start: 2022-04-13 | End: 2022-04-13 | Stop reason: SDUPTHER

## 2022-04-13 RX ORDER — SODIUM CHLORIDE 9 MG/ML
INJECTION, SOLUTION INTRAVENOUS PRN
Status: DISCONTINUED | OUTPATIENT
Start: 2022-04-13 | End: 2022-04-13 | Stop reason: HOSPADM

## 2022-04-13 RX ORDER — DEXAMETHASONE SODIUM PHOSPHATE 10 MG/ML
INJECTION, SOLUTION INTRAMUSCULAR; INTRAVENOUS PRN
Status: DISCONTINUED | OUTPATIENT
Start: 2022-04-13 | End: 2022-04-13 | Stop reason: SDUPTHER

## 2022-04-13 RX ORDER — SODIUM CHLORIDE 0.9 % (FLUSH) 0.9 %
5-40 SYRINGE (ML) INJECTION EVERY 12 HOURS SCHEDULED
Status: DISCONTINUED | OUTPATIENT
Start: 2022-04-13 | End: 2022-04-13 | Stop reason: HOSPADM

## 2022-04-13 RX ORDER — DIMENHYDRINATE 50 MG/1
50 TABLET ORAL ONCE
Status: COMPLETED | OUTPATIENT
Start: 2022-04-13 | End: 2022-04-13

## 2022-04-13 RX ORDER — METOCLOPRAMIDE HYDROCHLORIDE 5 MG/ML
10 INJECTION INTRAMUSCULAR; INTRAVENOUS
Status: DISCONTINUED | OUTPATIENT
Start: 2022-04-13 | End: 2022-04-13 | Stop reason: HOSPADM

## 2022-04-13 RX ORDER — SODIUM CHLORIDE 9 MG/ML
25 INJECTION, SOLUTION INTRAVENOUS PRN
Status: CANCELLED | OUTPATIENT
Start: 2022-04-13

## 2022-04-13 RX ORDER — ROCURONIUM BROMIDE 10 MG/ML
INJECTION, SOLUTION INTRAVENOUS PRN
Status: DISCONTINUED | OUTPATIENT
Start: 2022-04-13 | End: 2022-04-13 | Stop reason: SDUPTHER

## 2022-04-13 RX ORDER — ACETAMINOPHEN 325 MG/1
650 TABLET ORAL ONCE
Status: COMPLETED | OUTPATIENT
Start: 2022-04-13 | End: 2022-04-13

## 2022-04-13 RX ORDER — ROPIVACAINE HYDROCHLORIDE 5 MG/ML
INJECTION, SOLUTION EPIDURAL; INFILTRATION; PERINEURAL PRN
Status: DISCONTINUED | OUTPATIENT
Start: 2022-04-13 | End: 2022-04-13 | Stop reason: SDUPTHER

## 2022-04-13 RX ORDER — HYDROCODONE BITARTRATE AND ACETAMINOPHEN 5; 325 MG/1; MG/1
1 TABLET ORAL EVERY 6 HOURS PRN
Qty: 12 TABLET | Refills: 0 | Status: SHIPPED | OUTPATIENT
Start: 2022-04-13 | End: 2022-04-16

## 2022-04-13 RX ORDER — MIDAZOLAM HYDROCHLORIDE 1 MG/ML
INJECTION INTRAMUSCULAR; INTRAVENOUS PRN
Status: DISCONTINUED | OUTPATIENT
Start: 2022-04-13 | End: 2022-04-13 | Stop reason: SDUPTHER

## 2022-04-13 RX ORDER — ONDANSETRON 2 MG/ML
4 INJECTION INTRAMUSCULAR; INTRAVENOUS
Status: DISCONTINUED | OUTPATIENT
Start: 2022-04-13 | End: 2022-04-13 | Stop reason: HOSPADM

## 2022-04-13 RX ORDER — DEXAMETHASONE SODIUM PHOSPHATE 4 MG/ML
INJECTION, SOLUTION INTRA-ARTICULAR; INTRALESIONAL; INTRAMUSCULAR; INTRAVENOUS; SOFT TISSUE PRN
Status: DISCONTINUED | OUTPATIENT
Start: 2022-04-13 | End: 2022-04-13 | Stop reason: SDUPTHER

## 2022-04-13 RX ORDER — GABAPENTIN 100 MG/1
100 CAPSULE ORAL ONCE
Status: COMPLETED | OUTPATIENT
Start: 2022-04-13 | End: 2022-04-13

## 2022-04-13 RX ORDER — SODIUM CHLORIDE 0.9 % (FLUSH) 0.9 %
10 SYRINGE (ML) INJECTION PRN
Status: CANCELLED | OUTPATIENT
Start: 2022-04-13

## 2022-04-13 RX ORDER — SODIUM CHLORIDE 0.9 % (FLUSH) 0.9 %
10 SYRINGE (ML) INJECTION EVERY 12 HOURS SCHEDULED
Status: CANCELLED | OUTPATIENT
Start: 2022-04-13

## 2022-04-13 RX ORDER — INDOCYANINE GREEN AND WATER 25 MG
7.5 KIT INJECTION ONCE
Status: COMPLETED | OUTPATIENT
Start: 2022-04-13 | End: 2022-04-13

## 2022-04-13 RX ORDER — LEVOFLOXACIN 5 MG/ML
500 INJECTION, SOLUTION INTRAVENOUS ONCE
Status: COMPLETED | OUTPATIENT
Start: 2022-04-13 | End: 2022-04-13

## 2022-04-13 RX ORDER — SODIUM CHLORIDE 9 MG/ML
25 INJECTION, SOLUTION INTRAVENOUS PRN
Status: DISCONTINUED | OUTPATIENT
Start: 2022-04-13 | End: 2022-04-13 | Stop reason: HOSPADM

## 2022-04-13 RX ADMIN — INDOCYANINE GREEN AND WATER 7.5 MG: KIT at 12:17

## 2022-04-13 RX ADMIN — SODIUM CHLORIDE 32 ML: 9 INJECTION INTRAMUSCULAR; INTRAVENOUS; SUBCUTANEOUS at 12:11

## 2022-04-13 RX ADMIN — ROPIVACAINE HYDROCHLORIDE 48 ML: 5 INJECTION, SOLUTION EPIDURAL; INFILTRATION; PERINEURAL at 12:11

## 2022-04-13 RX ADMIN — DIMENHYDRINATE 50 MG: 50 TABLET ORAL at 11:26

## 2022-04-13 RX ADMIN — SODIUM CHLORIDE, POTASSIUM CHLORIDE, SODIUM LACTATE AND CALCIUM CHLORIDE: 600; 310; 30; 20 INJECTION, SOLUTION INTRAVENOUS at 11:27

## 2022-04-13 RX ADMIN — ROCURONIUM BROMIDE 30 MG: 10 SOLUTION INTRAVENOUS at 12:58

## 2022-04-13 RX ADMIN — MIDAZOLAM 2 MG: 1 INJECTION INTRAMUSCULAR; INTRAVENOUS at 12:05

## 2022-04-13 RX ADMIN — GABAPENTIN 100 MG: 100 CAPSULE ORAL at 11:26

## 2022-04-13 RX ADMIN — DEXAMETHASONE SODIUM PHOSPHATE 10 MG: 10 INJECTION, SOLUTION INTRAMUSCULAR; INTRAVENOUS at 12:11

## 2022-04-13 RX ADMIN — ROCURONIUM BROMIDE 20 MG: 10 SOLUTION INTRAVENOUS at 13:15

## 2022-04-13 RX ADMIN — PROPOFOL 200 MG: 10 INJECTION, EMULSION INTRAVENOUS at 12:58

## 2022-04-13 RX ADMIN — LEVOFLOXACIN 500 MG: 5 INJECTION, SOLUTION INTRAVENOUS at 15:12

## 2022-04-13 RX ADMIN — ACETAMINOPHEN 650 MG: 325 TABLET ORAL at 11:26

## 2022-04-13 RX ADMIN — DEXAMETHASONE SODIUM PHOSPHATE 4 MG: 4 INJECTION, SOLUTION INTRAMUSCULAR; INTRAVENOUS at 13:42

## 2022-04-13 RX ADMIN — OXYCODONE 5 MG: 5 TABLET ORAL at 16:09

## 2022-04-13 RX ADMIN — FENTANYL CITRATE 50 MCG: 50 INJECTION INTRAMUSCULAR; INTRAVENOUS at 13:15

## 2022-04-13 RX ADMIN — DEXTROSE MONOHYDRATE 1000 MG: 50 INJECTION, SOLUTION INTRAVENOUS at 12:48

## 2022-04-13 RX ADMIN — LIDOCAINE HYDROCHLORIDE 100 MG: 20 INJECTION, SOLUTION EPIDURAL; INFILTRATION; INTRACAUDAL; PERINEURAL at 12:58

## 2022-04-13 ASSESSMENT — PAIN SCALES - GENERAL
PAINLEVEL_OUTOF10: 3
PAINLEVEL_OUTOF10: 7
PAINLEVEL_OUTOF10: 7

## 2022-04-13 ASSESSMENT — ENCOUNTER SYMPTOMS
TROUBLE SWALLOWING: 0
BLOOD IN STOOL: 0
NAUSEA: 0
CHOKING: 0
SHORTNESS OF BREATH: 0
ABDOMINAL DISTENTION: 1
COUGH: 0
VOMITING: 0
BACK PAIN: 0
ABDOMINAL PAIN: 1
SORE THROAT: 0

## 2022-04-13 ASSESSMENT — PAIN DESCRIPTION - PROGRESSION: CLINICAL_PROGRESSION: RAPIDLY IMPROVING

## 2022-04-13 ASSESSMENT — PAIN DESCRIPTION - LOCATION
LOCATION: ABDOMEN
LOCATION: ABDOMEN

## 2022-04-13 ASSESSMENT — PAIN DESCRIPTION - PAIN TYPE
TYPE: SURGICAL PAIN
TYPE: SURGICAL PAIN

## 2022-04-13 NOTE — ANESTHESIA POSTPROCEDURE EVALUATION
Department of Anesthesiology  Postprocedure Note    Patient: Blaine Kingsley  MRN: [id number]  YOB: 1975  Date of evaluation: 4/13/2022  Time:  4:06 PM     Procedure Summary     Date: 04/13/22 Room / Location: [address] [place] [address]    Anesthesia Start: 5706 Anesthesia Stop: 9772    Procedure: CHOLECYSTECTOMY LAPAROSCOPIC ROBOTIC (N/A Abdomen) Diagnosis: (GALLSTONE)    Surgeons: Bhavik Gilbert MD Responsible Provider: Patricia Banegas. FINESSE Guerra CRNA    Anesthesia Type: general ASA Status: 3          Anesthesia Type: general    Adrianna Phase I: Adrianna Score: 9    Adrianna Phase II: Adrianna Score: 10    Last vitals: Reviewed and per EMR flowsheets.        Anesthesia Post Evaluation    Patient location during evaluation: PACU  Patient participation: complete - patient participated  Level of consciousness: awake and alert  Pain score: 7 (FLAC score 0-2)  Airway patency: patent  Nausea & Vomiting: no nausea and no vomiting  Complications: no  Cardiovascular status: blood pressure returned to baseline  Respiratory status: acceptable and room air  Hydration status: stable  Multimodal analgesia pain management approach

## 2022-04-13 NOTE — BRIEF OP NOTE
Brief Postoperative Note      Patient: Veronika Gonzalez  YOB: 1975  MRN: [id number]    Date of Procedure: 4/13/2022    Pre-Op Diagnosis:      1. Symptomatic cholelithiasis    Post-Op Diagnosis:      1. Cholecystitis, cholelithiasis        Operation:     1. Lap yoel, robotic assist    Surgeon(s):  Manjinder Patterson MD    Assistant:  * No surgical staff found *    Anesthesia: General    Estimated Blood Loss (mL): less than 50 cc    Complications: None    Specimens:   ID Type Source Tests Collected by Time Destination   A :  Tissue Gallbladder SURGICAL PATHOLOGY Manjinder Patterson MD 4/13/2022 1344      Findings:  As above.     Dictated # [id number]    Electronically signed by Manjinder Patterson MD on 4/13/2022 at 2:44 PM

## 2022-04-13 NOTE — PROGRESS NOTES
[address] notified of bp 171/89. States ok to discharge. Pt states ready to go home. Discharge instructions given to pt and family. Copy of Irish discharge instructions given to pt also. Discharge Criteria    Inpatients must meet Criteria 1 through 7. All other patients are either YES or N/A. If a NO is chosen then Anesthesia or Surgeon must be notified. 1.  Minimum 30 minutes after last dose of sedative medication, minimum 120 minutes after last dose of reversal agent. Yes      2. Systolic BP stable within 20 mmHg for 30 minutes & systolic BP between 90 & 707 or within 10 mmHg of baseline. Yes      3. Pulse between 60 and 100 or within 10 bpm of baseline. Yes      4. Spontaneous respiratory rate >/= 10 per minute. Yes      5. SaO2 >/= 95 or  >/= baseline. Yes      6. Able to cough and swallow or return to baseline function. Yes      7. Alert and oriented or return to baseline mental status. Yes      8. Demonstrates controlled, coordinated movements, ambulates with steady gait, or return to baseline activity function. Yes      9. Minimal or no pain or nausea, or at a level tolerable and acceptable to patient. Yes      10. Takes and retains oral fluids as allowed. Yes      11. Procedural / perioperative site stable. Minimal or no bleeding. Yes          12. If GI endoscopy procedure, minimal or no abdominal distention or passing flatus. N/A      13. Written discharge instructions and emergency telephone number provided. Yes      14. Accompanied by a responsible adult.     Yes

## 2022-04-13 NOTE — PATIENT INSTRUCTIONS
Patient Education        Aprenda sobre colecistectomía  Learning About Cholecystectomy  ¿Qué es colecistectomía? La colecistectomía es [place] para extirpar la vesícula biliar y los cálculos biliares. La vesícula biliar almacena la bilis que produce purdy hígado. La bilis le ayuda a digerir las grasas. Los cálculos biliares están hechos decolesterol y otras cosas que se encuentran en la bilis. Purdy cuerpo funcionará josh sin la vesícula biliar. La bilis irá directamente desde el hígado hasta el intestino. Puede vaishnavi pequeños cambios en purdy forma dedigerir los alimentos, akne probablemente no los notará. ¿Cómo se hace la cirugía? La colecistectomía suele ser Isaac Man. Para hacer jyoti tipo de cirugía, un médico coloca un tubo iluminado, o laparoscopio, y otros instrumentos quirúrgicos a través de pequeños armstrong (incisiones) en el abdomen. El médico puede observar sylvia órganos con el laparoscopio. Melly Magic que se extrae la vesícula biliar, ya no tendrá más cálculos biliares. Los cortesdejan cicatrices que suelen desvanecerse con el tiempo. Se puede hacer cirugía abierta si se encuentran problemas xi la cirugía laparoscópica. Con cirugía abierta, se extrae la vesícula biliar a través de uncorte más abbie en el abdomen. Y la estadía hospitalaria es [employer]. ¿Qué puede esperar después de la cirugía? Es normal sentirse débil y cansado por varios días después de regresar a casa. Purdy abdomen puede estar hinchado. Si tuvo cirugía laparoscópica, también puedetener dolor en el hombro xi unas 24 horas. Es posible que tenga gases o necesite eructar mucho al principio. A algunas personas les da diarrea. La diarrea suele desaparecer en 2 a 4 semanas. [place] puede durar [employer]. Cómo de rápido se recupera usted dependedel tipo de cirugía que le hicieron. Con cirugía laparoscópica, la mayoría de la gente puede volver al [place] o purdy rutina normal en 1 a 2 semanas.  Depende del tipo de trabajo que usted hace y decómo se sienta. Si usted tiene [place], probablemente le tomará de 4 a 6 semanas antesde volver a purdy rutina normal.  La atención de seguimiento es josseline parte clave de purdy tratamiento y seguridad. Asegúrese de hacer y acudir a todas las citas, y llame a purdy médico si está teniendo problemas. También es josseline buena idea saber los [place] de susexámenes y mantener josseline lista de los medicamentos que fiona. ¿Dónde puede encontrar más información en inglés? [place] a [email or web address].[email or web address]-[email or web address]. org e ingrese a purdy cuenta de MyChart. Mercedes Real R766 en el [facility] \"Search Health Information\" para más información (en inglés) sobre \"Aprenda sobre colecistectomía. \"     Si no tiene josseline cuenta, karen johnathan en el enlace \"Sign Up Now\". Revisado: 8 septiembre, 2021               Versión del contenido: 13.2  © 2314-9489 [employer]wise, Incorporated. Las instrucciones de cuidado fueron adaptadas bajo licencia por [address] Si usted tiene [place] afección médica o sobre estas instrucciones, siempre pregunte a purdy profesional de christiano. Healthwise, Incorporated niega toda garantía o responsabilidad por purdy uso de esta información.

## 2022-04-13 NOTE — ANESTHESIA PROCEDURE NOTES
Peripheral Block    Patient location during procedure: holding area  Start time: 4/13/2022 12:05 PM  End time: 4/13/2022 12:11 PM  Staffing  Resident/CRNA: FINESSE Levin CRNA  Preanesthetic Checklist  Completed: patient identified, IV checked, site marked, risks and benefits discussed, surgical consent, monitors and equipment checked, pre-op evaluation, timeout performed, anesthesia consent given, oxygen available and patient being monitored  Peripheral Block  Patient position: supine  Prep: ChloraPrep  Patient monitoring: continuous pulse ox and IV access (NIPB and EKG immediately avail)  Block type: TAP  Laterality: bilateral  Injection technique: single-shot  Guidance: ultrasound guided  Local infiltration: decadron and ropivacaine  Provider prep: sterile gloves and mask  Local infiltration: decadron and ropivacaine  Needle  Needle type: short-bevel   Needle gauge: 22 G  Needle length: 8 cm  Needle localization: ultrasound guidance  Assessment  Injection assessment: negative aspiration for heme and no paresthesia on injection (local spread in fascial planes observed)  Paresthesia pain: none  Slow fractionated injection: yes  Hemodynamics: stable  Additional Notes  24 ml ropivacaine 0.5%+16 ml NaCl inj+5 mg dexamethasone per side  Reason for block: post-op pain management and at surgeon's request

## 2022-04-13 NOTE — ANESTHESIA PRE PROCEDURE
Department of Anesthesiology  Preprocedure Note       Name:  Stephie Flores   Age:  40 y.o.  :  1975                                          MRN:  [id number]         Date:  2022      Surgeon: Opal Chairez):  Tony Mccurdy MD    Procedure: Procedure(s):  CHOLECYSTECTOMY LAPAROSCOPIC ROBOTIC    Medications prior to admission:   Prior to Admission medications    Medication Sig Start Date End Date Taking? Authorizing Provider   gabapentin (NEURONTIN) 100 MG capsule Radha josseline capsula por via oral ajit veces a la semana despues de la dialisis. 21  Melissa Khna DO   lanthanum (FOSRENOL) 1000 MG chewable tablet Take 1 tablet by mouth daily 21   Historical Provider, MD   Sucroferric Oxyhydroxide (VELPHORO) 500 MG CHEW Take 2 tablets by mouth 21   Historical Provider, MD   metoprolol succinate (TOPROL XL) 100 MG extended release tablet TAKE 1 TABLET BY MOUTH EVERY DAY 3/16/21   Sourav Matthews DO   amLODIPine (NORVASC) 10 MG tablet TAKE 1 TABLET BY MOUTH EVERY DAY 3/16/21   Sourav Matthews DO   lidocaine-prilocaine (EMLA) 2.5-2.5 % cream APPLY SMALL AMOUNT TO ACCESS SITE (AVF) 1 HOUR BEFORE DIALYSIS.  COVER WITH OCCLUSIVE DRESSING (SARAN WRAP) 20   Historical Provider, MD   ergocalciferol (ERGOCALCIFEROL) 1.25 MG (51939 UT) capsule TAKE 1 CAPSULE BY MOUTH EVERY 2 WEEKS 20   Historical Provider, MD   hydrALAZINE (APRESOLINE) 50 MG tablet Take 1 tablet by mouth 2 times daily 19   Sourav Matthews DO   sevelamer (RENVELA) 800 MG tablet Take 1 tablet by mouth 3 times daily (with meals)    Historical Provider, MD       Current medications:    Current Facility-Administered Medications   Medication Dose Route Frequency Provider Last Rate Last Admin    lactated ringers infusion   IntraVENous Continuous Tony Mccurdy  mL/hr at 22 1127 New Bag at 22 1127    sodium chloride flush 0.9 % injection 5-40 mL  5-40 mL IntraVENous 2 times per day Dolores FLORES Merlin Erm, MD        sodium chloride flush 0.9 % injection 5-40 mL  5-40 mL IntraVENous PRN Noe Chow MD        0.9 % sodium chloride infusion  25 mL IntraVENous PRN Noe Chow MD        ceFAZolin (ANCEF) 1,000 mg in dextrose 5 % 50 mL IVPB (mini-bag)  1,000 mg IntraVENous On Call to [address] Merlin Erm, MD        indocyanine green (IC-GREEN) syringe 7.5 mg  7.5 mg IntraVENous Once Noe Chow MD           Allergies:     Allergies   Allergen Reactions    Sensipar  [Cinacalcet]      Other reaction(s): Unknown       Problem List:    Patient Active Problem List   Diagnosis Code    ESRD (end stage renal disease) on dialysis ([facility] N18.6, Z99.2    Uncontrolled hypertension I10    Hypertriglyceridemia E78.1    Vitamin D deficiency E55.9    Chronic glomerulonephritis with pathological lesion in kidney N03.9    Secondary hyperparathyroidism ([facility] N25.81    Polycystic kidney disease Q61.3       Past Medical History:        Diagnosis Date    End-stage renal disease on hemodialysis ([facility]     since 2006, due to polycystic kidney disease    Hypertension     Hypertriglyceridemia        Past Surgical History:        Procedure Laterality Date    DIALYSIS FISTULA CREATION      TH[facility]RO[facility]OM[facility]  2012    [facility]       Social History:    Social History     Tobacco Use    Smoking status: Never Smoker    Smokeless tobacco: Never Used   Substance Use Topics    Alcohol use: No     Alcohol/week: 0.0 standard drinks                                Counseling given: Not Answered      Vital Signs (Current):   Vitals:    04/01/22 1403 04/13/22 1115   BP:  (!) 171/93   Pulse:  63   Resp:  18   Temp:  36.3 °C (97.3 °F)   TempSrc:  Temporal   SpO2:  97%   Weight: 160 lb (72.6 kg) 163 lb (73.9 kg)   Height: 5' 4\" (1.626 m) 5' 4\" (1.626 m)                                              BP Readings from Last 3 Encounters:   04/13/22 (!) 171/93   02/24/22 100/72   02/21/22 (!) 155/87       NPO Status: Time of last liquid consumption: 2000                        Time of last solid consumption: 2000                        Date of last liquid consumption: 04/12/22                        Date of last solid food consumption: 04/12/22    BMI:   Wt Readings from Last 3 Encounters:   04/13/22 163 lb (73.9 kg)   02/24/22 164 lb 9.6 oz (74.7 kg)   02/21/22 162 lb (73.5 kg)     Body mass index is 27.98 kg/m². CBC:   Lab Results   Component Value Date    WBC 8.6 02/21/2022    RBC 4.50 02/21/2022    HGB 13.8 02/21/2022    HCT 41.4 02/21/2022    MCV 92.1 02/21/2022    RDW 16.3 02/21/2022     02/21/2022       CMP:   Lab Results   Component Value Date     02/21/2022    K 5.4 02/21/2022    CL 91 02/21/2022    CO2 23 02/21/2022    BUN 60 02/21/2022    CREATININE 14.25 02/21/2022    GFRAA 5 02/21/2022    LABGLOM 4 02/21/2022    GLUCOSE 120 02/21/2022    PROT 7.8 02/21/2022    CALCIUM 10.4 02/21/2022    BILITOT 0.37 02/21/2022    ALKPHOS 90 02/21/2022    AST 14 02/21/2022    ALT 33 02/21/2022       POC Tests: No results for input(s): POCGLU, POCNA, POCK, POCCL, POCBUN, POCHEMO, POCHCT in the last 72 hours.     Coags: No results found for: PROTIME, INR, APTT    HCG (If Applicable): No results found for: PREGTESTUR, PREGSERUM, HCG, HCGQUANT     ABGs: No results found for: PHART, PO2ART, JUA7BXY, IBZ7RIW, BEART, P0IDJWBW     Type & Screen (If Applicable):  No results found for: LABABO, LABRH    Drug/Infectious Status (If Applicable):  Lab Results   Component Value Date    HEPCAB NONREACTIVE 11/28/2016       COVID-19 Screening (If Applicable):   Lab Results   Component Value Date    COVID19 Not Detected 05/03/2021           Anesthesia Evaluation  Patient summary reviewed and Nursing notes reviewed  Airway: Mallampati: II  TM distance: >3 FB   Neck ROM: full  Mouth opening: > = 3 FB Dental:          Pulmonary:Negative Pulmonary ROS and normal exam  breath sounds clear to auscultation                             Cardiovascular:    (+) hypertension: moderate,       ECG reviewed  Rhythm: regular  Rate: normal                 ROS comment: 2/2022 - EKG SB, otherwise normal     Neuro/Psych:   Negative Neuro/Psych ROS              GI/Hepatic/Renal:   (+) renal disease: ESRD and dialysis,          ROS comment: DIALYSIS MON, WED, FRI. DIALYSIS RECEIVED YESTERDAY IN ANTICIPATION OF SURGERY. Endo/Other:    (+) hypothyroidism::., .          Pt had PAT visit. ROS comment: THYROIDECTOMY, secondary hyperparathyroidism Abdominal:             Vascular: negative vascular ROS. Other Findings:           Anesthesia Plan      general     ASA 3             Anesthetic plan and risks discussed with patient ( service was used for preop eval and consent).                       Amelia Recio APRN - CRNA   4/13/2022

## 2022-04-13 NOTE — H&P
HPI     Mr Freeman Palomares is a very pleasant 28-year-old French-speaking male presenting for evaluation of symptomatic cholelithiasis. He has had a long history of postprandial abdominal pain, mostly right upper quadrant, with nausea. Gallbladder ultrasound February 21, 2022 showing hepatic steatosis, cholelithiasis without cholecystitis nor biliary obstruction, and polycystic kidney disease. His kidney disease was evaluated with MRI November 2021 showing innumerable bilateral renal cysts and renal enlargement. He is now end-stage renal disease requiring hemodialysis since 2006. No prior abdominal surgery. Thyroid lobectomy 2012. No recent weight changes, BMI 27. No cough, fever nor other respiratory symptoms. Daily bowel movements, formed and brown, without blood. No family history of malignancy to his knowledge. Patient has never smoked.     Review of Systems   Constitutional: Negative for activity change, appetite change, chills, fever and unexpected weight change. HENT: Negative for nosebleeds, sneezing, sore throat and trouble swallowing. Eyes: Negative for visual disturbance. Respiratory: Negative for cough, choking and shortness of breath. Cardiovascular: Negative for chest pain, palpitations and leg swelling. Gastrointestinal: Positive for abdominal distention and abdominal pain (RUQ). Negative for blood in stool, nausea and vomiting. Genitourinary: Negative for dysuria, flank pain and hematuria. Musculoskeletal: Positive for arthralgias. Negative for back pain, gait problem and myalgias. Allergic/Immunologic: Negative for immunocompromised state. Neurological: Negative for dizziness, seizures, syncope, weakness and headaches. Hematological: Does not bruise/bleed easily.    Psychiatric/Behavioral: Negative for confusion and sleep disturbance.         Past Medical History        Past Medical History:   Diagnosis Date    End-stage renal disease on hemodialysis ([facility]       since 2006, due to polycystic kidney disease    Hypertension      Hypertriglyceridemia              Past Surgical History         Past Surgical History:   Procedure Laterality Date    DIALYSIS FISTULA CREATION        THYROID LOBECTOMY   2012     [facility]            Family History   No family history on file.        Allergies:  See list     Current Facility-Administered Medications          Current Outpatient Medications   Medication Sig Dispense Refill    gabapentin (NEURONTIN) 100 MG capsule Radha josseline capsula por via oral ajit veces a la semana despues de la dialisis. 30 capsule 2    lanthanum (FOSRENOL) 1000 MG chewable tablet Take 1 tablet by mouth daily        Sucroferric Oxyhydroxide (VELPHORO) 500 MG CHEW Take 2 tablets by mouth        metoprolol succinate (TOPROL XL) 100 MG extended release tablet TAKE 1 TABLET BY MOUTH EVERY DAY 90 tablet 3    amLODIPine (NORVASC) 10 MG tablet TAKE 1 TABLET BY MOUTH EVERY DAY 90 tablet 3    lidocaine-prilocaine (EMLA) 2.5-2.5 % cream APPLY SMALL AMOUNT TO ACCESS SITE (AVF) 1 HOUR BEFORE DIALYSIS. COVER WITH OCCLUSIVE DRESSING (SARAN WRAP)        ergocalciferol (ERGOCALCIFEROL) 1.25 MG (03504 UT) capsule TAKE 1 CAPSULE BY MOUTH EVERY 2 WEEKS        hydrALAZINE (APRESOLINE) 50 MG tablet Take 1 tablet by mouth 2 times daily 60 tablet 5    sevelamer (RENVELA) 800 MG tablet Take 1 tablet by mouth 3 times daily (with meals)          No current facility-administered medications for this visit.            Social History   Social History            Socioeconomic History    Marital status:        Spouse name: Not on file    Number of children: Not on file    Years of education: Not on file    Highest education level: Not on file   Occupational History    Not on file   Tobacco Use    Smoking status: Never Smoker    Smokeless tobacco: Never Used   Substance and Sexual Activity    Alcohol use:  No       Alcohol/week: 0.0 standard drinks    Drug use: No    Sexual activity: Not on file   Other Topics Concern    Not on file   Social History Narrative    Not on file      Social Determinants of Health          Financial Resource Strain: Low Risk     Difficulty of Paying Living Expenses: Not hard at all   Food Insecurity: No Food Insecurity    Worried About Running Out of Food in the Last Year: Never true    [address] in the Last Year: Never true   Transportation Needs:     Lack of Transportation (Medical): Not on file    Lack of Transportation (Non-Medical): Not on file   Physical Activity:     Days of Exercise per Week: Not on file    Minutes of Exercise per Session: Not on file   Stress:     Feeling of Stress : Not on file   Social Connections:     Frequency of Communication with Friends and Family: Not on file    Frequency of Social Gatherings with Friends and Family: Not on file    Attends Rastafarian Services: Not on file    Active Member of [address] [place] [address] [employer] or Organizations: Not on file    Attends Club or Organization Meetings: Not on file    Marital Status: Not on file   Intimate Partner Violence:     Fear of Current or Ex-Partner: Not on file    Emotionally Abused: Not on file    Physically Abused: Not on file    Sexually Abused: Not on file   Housing Stability:     Unable to Pay for Housing in the Last Year: Not on file    Number of [place] in the Last Year: Not on file    Unstable Housing in the Last Year: Not on file            There were no vitals taken for this visit.      Physical Exam  Vitals and nursing note reviewed. Constitutional:       Appearance: He is well-developed. HENT:      Head: Normocephalic and atraumatic. Eyes:      General: No scleral icterus. Conjunctiva/sclera: Conjunctivae normal.      Pupils: Pupils are equal, round, and reactive to light. Neck:      Vascular: No JVD. Trachea: No tracheal deviation. Cardiovascular:      Rate and Rhythm: Normal rate and regular rhythm.    Pulmonary:      Effort: Pulmonary effort is normal. No respiratory distress. Chest:      Chest wall: No tenderness. Abdominal:      General: There is no distension. Palpations: Abdomen is soft. There is no mass. Tenderness: There is no abdominal tenderness. There is no guarding or rebound. Musculoskeletal:         General: Normal range of motion. Cervical back: Normal range of motion and neck supple. Lymphadenopathy:      Cervical: No cervical adenopathy. Skin:     General: Skin is warm and dry. Findings: No erythema or rash. Neurological:      Mental Status: He is alert and oriented to person, place, and time. Cranial Nerves: No cranial nerve deficit. Psychiatric:         Behavior: Behavior normal.         Thought Content: Thought content normal.         Judgment: Judgment normal.            IMAGING/LABS     US GALLBLADDER RUQ     Status: Final result         Order Providers     Authorizing Billing   MD Marvel Weinberg MD              Signed by     Signed Date/Time Phone Pager   Catrina Tomas 2/21/2022 11:17 [phone]        Reading Providers     Read Date Phone Pager   Albino Prieto Feb 21, 2022  9:46 [phone]        US GALLBLADDER RUQ: Patient Communication     Not Released Not seen      Routing History     Priority Sent On From To Message Type     2/21/2022  9:46 AM Chema, [facility] Incoming Radiant Results From [facility] F/U Results      Radiation Dose Estimates     No radiation information found for this patient  Narrative   HISTORY: Right upper quadrant abdominal pain           TECHNIQUE: Transabdominal ultrasound was performed of the right upper quadrant.       COMPARISON: CT scan 10/21/2021           FINDINGS: Liver demonstrates fatty infiltration and is normal in size. No    masses or biliary ductal dilatation is seen. Common bile duct measures 4 mm. There is cholelithiasis but no evidence for acute cholecystitis. Pancreas is    partially obscured.  The kidney is enlarged measuring 15.3 cm on the right    secondary to autosomal dominant polycystic kidney disease. No ascites is seen. Aorta is unremarkable.               Impression       1. Fatty liver.       2. Cholelithiasis. No acute process.       3.  Autosomal dominant polycystic kidney disease.              Contains critical data Comprehensive Metabolic w/ Bili Profile w/ Reflex to MG  Order: [id number]   Status: Final result     Visible to patient: No (not released)     Next appt: 08/24/2022 at 10:20 AM in Family [facility] Rojas Mccormick DO)     0 Result Notes     Component Ref Range & Units 2/21/22 0441 8/24/21 0906 5/3/21 1210 12/26/20 1658 2/9/19 0908 11/30/16 2259 11/30/16 2259   Albumin 3.5 - 5.2 g/dL 4.6    5.1  3.8  4.6  4.8 [facility]      Albumin/Globulin Ratio 1.0 - 2.5 NOT REPORTED    NOT REPORTED  NOT REPORTED  NOT REPORTED  NOT REPORTED [1]      Alkaline Phosphatase 40 - 129 U/L 90    100  54  75  73 [facility]      ALT 5 - 41 U/L 33    12  13  17  11 [facility]      AST <40 U/L 14    15  13  11  11 [facility]      Total Bilirubin 0.30 - 1.20 mg/dL 0.37    0.36  0.26 Low   0.59  0.34 [facility]      Bilirubin, Direct <0.31 mg/dL <0.08          <0.08 [facility]      Bilirubin, Indirect 0.00 - 1.00 mg/dL Can not be calculated          CANNOT BE CALCULATED [facility]      BUN 6 - 20 mg/dL 60 High   55 High   33 High   56 High   37 High     25 High     Calcium 8.6 - 10.4 mg/dL 10.4  8.5 Low   9.1  8.4 Low   9.7    9.2    CREATININE 0.70 - 1.20 mg/dL 14.25 High Panic   10.94 High Panic   7.08 High Panic   14.92 High Panic   9.68 High Panic     6.51 High Panic     Glucose 70 - 99 mg/dL 120 High   94  97  99  96    106 High     Total Protein 6.4 - 8.3 g/dL 7.8    8.6 High   7.0  9.1 High   8.1 [facility]      Sodium 135 - 144 mmol/L 135  134 Low   140  136  137    140    Potassium 3.7 - 5.3 mmol/L 5.4 High   5.1  4.0  6.9 High Panic   5.1    3.9    Chloride 98 - 107 mmol/L 91 Low   91 Low   96 Low   90 Low   93 Low     95 Low     CO2 20 - 31 mmol/L 23  26  28  28  30    32 High     Anion Gap 9 - 17 mmol/L 21 High   17  16  18 High   14    13    GFR Non- >60 mL/min 4 Low   5 Low   8 Low   4 Low   6 Low     10 Low     GFR African American >60 mL/min 5 Low   6 Low   10 Low   4 Low   7 Low     12 Low     GFR Comment              CM       CM       CM       CM         CM    Comment: Average GFR for [age] years old:    80 mL/min/1.73sq m   Chronic Kidney Disease:    <60 mL/min/1.73sq m   Kidney failure:          Contains abnormal data CBC with Auto Differential  Order: [id number]   Status: Final result     Visible to patient: No (not released)     Next appt: 08/24/2022 at 10:20 AM in [facility] Emily Miramontes DO)     0 Result Notes     Component Ref Range & Units 2/21/22 0441 8/24/21 0906 5/4/21 0510 5/3/21 1210 12/26/20 1658 2/9/19 0908 11/30/16 2259   WBC 3.5 - 11.0 k/uL 8.6  7.5  6.6  7.3  5.5  6.2  8.7 [facility]    RBC 4.5 - 5.9 m/uL 4.50  4.85  4.13 Low   4.58  3.39 Low   4.78  3.93 Low  [facility]    Hemoglobin 13.5 - 17.5 g/dL 13.8  15.2  12.9 Low   14.4  10.8 Low   14.7  12.4 Low  [facility]    Hematocrit 41 - 53 % 41.4  45.6  38.6 Low   42.7  31.8 Low   45.5  35.8 Low  [facility]    MCV 80 - 100 fL 92.1  94.1  93.5  93.2  94.0  95.1  91.0 [facility]    MCH 26 - 34 pg 30.7  31.3  31.4  31.5  31.9  30.7  31.5 [facility]    MCHC 31 - 37 g/dL 33.3  33.2  33.5  33.8  34.0  32.2  34.6 [facility]    RDW 12.1 - 15.2 % 16.3 High   14.7  14.9  14.8  14.4  15.4 High   13.7 [facility]    Platelets 402 - 244 k/uL 211  214  225  243  148  236  255 [facility]    MPV 6.0 - 12.0 fL NOT REPORTED  NOT REPORTED  NOT REPORTED  NOT REPORTED  NOT REPORTED  NOT REPORTED  NOT REPORTED [1]    NRBC Automated per 100 WBC NOT REPORTED  NOT REPORTED  NOT REPORTED  NOT REPORTED  NOT REPORTED  NOT REPORTED      Differential Type   YES  YES    YES  YES  YES  YES [facility]    Seg Neutrophils 39 - 75 % 76 High   71    67  78 High   66  68 [facility]    Lymphocytes 13 - 44 % 17  18    21  11 Low   23  20 [facility]    Monocytes 5 - 9 % 6  9    8  10 High   8  9 [facility]    Eosinophils % 0 - 5 % 1  1    3  1  2  2 [facility]    Basophils 0 - 2 % 0  1    1  0  1  1 [facility]    Immature Granulocytes 0 % NOT REPORTED  NOT REPORTED    NOT REPORTED  NOT REPORTED  NOT REPORTED      Segs Absolute 2.1 - 6.5 k/uL 6.60 High   5.30    5.00  4.30  4.20  5.90 [facility]    Absolute Lymph # 1.0 - 4.8 k/uL 1.40  1.30    1.60  0.60 Low   1.40  1.70 [facility]    Absolute Mono # 0.0 - 1.0 k/uL 0.60  0.70    0.60  0.60  0.50  0.80 [facility]                ASSESSMENT       Diagnosis Orders   1. Symptomatic cholelithiasis      2. ESRD (end stage renal disease) on dialysis ([facility]      3. Uncontrolled hypertension      4. History of thyroid surgery      5. BMI 28.0-28.9,adult      6. Uses Montserratian as primary spoken language            PLAN     Previously discussed at length with Mr. Alyson Foote via [place]serratian/English  his history of postprandial abdominal pain and nausea with work-up revealing gallstones, as well as his history of end-stage renal disease with polycystic kidneys, dialysis requirements, etc.  We will proceed with elective laparoscopic cholecystectomy, robotic assist.  Risks, benefits, alternatives thoroughly reviewed and accepted by Mr. Alyson Foote, including bleeding, infection, bowel/bile duct injury, COVID-19 exposure/infection, etc.  [place], low-fat diet for now. We will coordinate scheduling of cholecystectomy with nephrology for appropriate dialysis timing. Patient conveys clear understanding and is in agreement.      Franklin Snyder MD

## 2022-04-13 NOTE — PROGRESS NOTES
[facility]          Department of Pharmacy   Pharmacy Renal Adjustment Note    Veronika Gonzalez is a 40 y.o. male. Pharmacist assessment of renally cleared medications. No results for input(s): CREATININE in the last 72 hours. Estimated Creatinine Clearance: 6 mL/min (A) (based on SCr of 14.25 mg/dL [facility])). Height:   Ht Readings from Last 1 Encounters:   04/01/22 5' 4\" (1.626 m)     Weight:  Wt Readings from Last 1 Encounters:   04/01/22 160 lb (72.6 kg)       The following medication(s) have been adjusted based upon renal function:             Cefazolin 2000mg IV once decreased to cefazolin 1000mg IV once for surgical prohylaxis. Verbal order change per Dr Rui Carreno.       Dimple Chavarria [facility],4/13/2022,10:39 AM

## 2022-04-14 NOTE — OP NOTE
[address]                                OPERATIVE REPORT    PATIENT NAME: Jovanna Michele               :        1975  MED REC NO:   [id number]                              ROOM:  ACCOUNT NO:   [de-identified]                           ADMIT DATE: 2022  PROVIDER:     Heidi Almazan    DATE OF PROCEDURE:  2022    ATTENDING SURGEON:  Dr. Heidi Almazan. PRIMARY CARE PROVIDER:  Delilah Barroso DO    PREOPERATIVE DIAGNOSIS:  Symptomatic cholelithiasis. POSTOPERATIVE DIAGNOSIS:  Cholecystitis with cholelithiasis. PROCEDURE PERFORMED:  Laparoscopic cholecystectomy, robotic assist.    ANESTHESIA:  General endotracheal tube. IV FLUIDS:  One liter of crystalloids. ESTIMATED BLOOD LOSS:  Less than 50 mL. INTRAOPERATIVE FINDINGS:  As mentioned above, cystic duct and artery  clearly visualized and confirmed with intraoperative indocyanine green  fluorescence. Good postoperative hemostasis. INDICATIONS:  The patient is a pleasant 81-year-old  male with a  long history of biliary colic. Workup showing hepatic steatosis  cholelithiasis without cholecystitis nor biliary obstruction. History  of polycystic kidney disease with end-stage renal failure requiring  hemodialysis since . A thyroid lobectomy in . BMI of 28. At  this time, elective cholecystectomy is indicated. DESCRIPTION OF PROCEDURE:  After obtaining informed consent with  discussion of the risks, benefits, and alternatives including a risk of  bleeding, infection, bowel/bile duct injury, COVID-19  exposure/infection, etc., the patient was taken to the operating theater  and placed in the supine position on the operating table. He received  preoperative IV antibiotics and SARAY sequentials. Following smooth  induction of general anesthesia, he was positioned, and prepped and  draped in the standard fashion.   An incision was carried down through  the skin and subcutaneous tissues just below the umbilicus. It was  deepened bluntly to the rectus fascia. Stay sutures of 0 Vicryl were  placed in the fascia. With anterior traction along the stay sutures, a  Veress needle was passed perpendicularly into the abdomen. Two clicks  were appreciated. Saline test showed rapid flow into the abdomen. A  pneumoperitoneum was then established to 15 mmHg. The Veress needle was  removed and a 12-mm balloon Visiport was placed under laparoscopic  vision directly into the abdomen. The rectus sheath and peritoneum were  clearly visualized during the course of their dissection. Upon entry  into the abdomen, all visible bowel, colon, and omentum were normal in  appearance with no evidence of needle nor trocar injury. Two 8-mm ports  were placed in the right and left abdominal wall anterior axillary line  with a third 8-mm port placed in the right upper quadrant midclavicular  line, all under direct vision. The patient was placed in reverse  Trendelenburg, left side down. The [email or web address] surgical robot was  appropriately docked. The gallbladder was grasped and retracted  superiorly. It was found to be inflamed with adhesions to the  infundibulum. These were taken down with very careful blunt dissection  with highly selective use of cautery. This allowed mobilization of the  infundibulum. , which was then taken laterally. The cystic duct and  artery were dissected from the surrounding tissues. These two  structures and only these two structures were seen going directly to the  gallbladder. This was confirmed with intraoperative indocyanine green  fluorescence. The cystic duct and artery were clipped twice along the  patient's side, once along the gallbladder, and divided with cautery. The gallbladder was then removed from the undersurface of the liver with  selective use of cautery.   Once freed, the gallbladder was retracted  away. Final inspection of the undersurface of the liver showed  excellent hemostasis. Clips were in place. No evidence of bile leak,  again confirmed with intraoperative indocyanine green fluorescence. The  right upper quadrant was suction irrigated clear. The gallbladder was  placed in an EndoCatch bag and retracted away. The surgical robot was  undocked and removed from the patient's bedside. Each port was removed  under direct vision again assuring hemostasis. The gallbladder was  removed through the umbilical port site and passed off as specimen. The  umbilical port site was closed by reapproximation of the rectus fascia  with 0 Vicryl in a figure-of-eight fashion. Skin edges were  reapproximated with skin staples and then covered with bacitracin and  sterile dressings. All sponge, needle, and instrument counts were  correct at the end of the case. The patient tolerated the procedure  well and was transferred to PACU in stable condition. SPECIMENS:  Gallbladder with stones. DRAINS:  None. COMPLICATIONS:  None. DISPOSITION:  To PACU extubated, awake, alert, and stable. Following  recovery, we will discharge the patient home with gradual advancement of  diet and activity as tolerated with instructions for no heavy lifting  nor abdominal straining for the next few weeks.   Followup will be with  me in one to two weeks for staple removal.        PAVEL Olvera    D: 04/13/2022 14:52:03       T: 04/13/2022 14:55:40     [email or web address]  Job#: [id number]     Doc#: [id number]    CC:  Karon Hilton

## 2022-04-15 LAB — SURGICAL PATHOLOGY REPORT: NORMAL

## 2022-04-18 ENCOUNTER — OFFICE VISIT (OUTPATIENT)
Dept: SURGERY | Age: 47
End: 2022-04-18

## 2022-04-18 VITALS
BODY MASS INDEX: 28 KG/M2 | DIASTOLIC BLOOD PRESSURE: 80 MMHG | OXYGEN SATURATION: 97 % | HEART RATE: 73 BPM | HEIGHT: 64 IN | RESPIRATION RATE: 18 BRPM | WEIGHT: 164 LBS | SYSTOLIC BLOOD PRESSURE: 112 MMHG

## 2022-04-18 DIAGNOSIS — Z09 POSTOP CHECK: Primary | ICD-10-CM

## 2022-04-18 DIAGNOSIS — Z78.9 USES SPANISH AS PRIMARY SPOKEN LANGUAGE: ICD-10-CM

## 2022-04-18 DIAGNOSIS — Z90.49 S/P LAPAROSCOPIC CHOLECYSTECTOMY: ICD-10-CM

## 2022-04-18 PROCEDURE — 99024 POSTOP FOLLOW-UP VISIT: CPT | Performed by: SURGERY

## 2022-04-18 NOTE — LETTER
[address]  [address] [facility] [address]  Phone: [phone]  Fax: [phone]    Nemesio Chavez MD    April 25, 2022     DO [address]  [place] [address]    Patient: Anabella Wells   MR Number: [id number]   YOB: 1975   Date of Visit: 4/18/2022       Dear Hany Workman:    Thank you for referring Anabella Wells to me for evaluation/treatment. Below are the relevant portions of my assessment and plan of care. ASSESSMENT     Diagnosis Orders   1. Postop check     2. S/P laparoscopic cholecystectomy     3. BMI 28.0-28.9,adult     4. Uses Tuvaluan as primary spoken language         PLAN    Mr Milford Meigs is doing very well. Original pain prior to surgery has resolved. Continue gradual advancement of diet and activity as tolerated with no heavy lifting or abdominal straining for the next few weeks. Follow-up next week for staple removal.     If you have questions, please do not hesitate to call me. I look forward to following Sophia Boudreaux along with you.     Sincerely,      Nemesio Chavez MD

## 2022-04-25 ENCOUNTER — OFFICE VISIT (OUTPATIENT)
Dept: SURGERY | Age: 47
End: 2022-04-25

## 2022-04-25 VITALS
HEART RATE: 78 BPM | HEIGHT: 64 IN | DIASTOLIC BLOOD PRESSURE: 72 MMHG | TEMPERATURE: 97.8 F | BODY MASS INDEX: 28.68 KG/M2 | WEIGHT: 168 LBS | SYSTOLIC BLOOD PRESSURE: 112 MMHG | RESPIRATION RATE: 18 BRPM

## 2022-04-25 DIAGNOSIS — Z09 POSTOP CHECK: Primary | ICD-10-CM

## 2022-04-25 PROCEDURE — 99024 POSTOP FOLLOW-UP VISIT: CPT | Performed by: SURGERY

## 2022-04-25 NOTE — PROGRESS NOTES
Bhavik Gilbert MD  General Surgery, Endoscopy  Chief Medical Officer    [facility] [address] [address]  Dept: [phone]  Fax: 01 [address] Marshall  Chief Complaint   Patient presents with    Post-Op Check     patient states slight pain with the staples; otherwise no concerns.  Suture / Staple Removal       HPI    Mr. Andrés Mayers returns for follow-up after cholecystectomy. DATE OF PROCEDURE:  04/13/2022     ATTENDING SURGEON:  Dr. Alvarez Deutsch.     PRIMARY CARE PROVIDER:  Leopoldo Cota, DO     PREOPERATIVE DIAGNOSIS:  Symptomatic cholelithiasis.     POSTOPERATIVE DIAGNOSIS:  Cholecystitis with cholelithiasis.     PROCEDURE PERFORMED:  Laparoscopic cholecystectomy, robotic assist.     ANESTHESIA:  General endotracheal tube.     INTRAOPERATIVE FINDINGS:  As mentioned above, cystic duct and artery  clearly visualized and confirmed with intraoperative indocyanine green  fluorescence. Good postoperative hemostasis.     INDICATIONS:  The patient is a pleasant 43-year-old  male with a  long history of biliary colic. Workup showing hepatic steatosis  cholelithiasis without cholecystitis nor biliary obstruction. History  of polycystic kidney disease with end-stage renal failure requiring  hemodialysis since 2006. A thyroid lobectomy in 2012. BMI of 28. At  this time, elective cholecystectomy is indicated. He is doing well. Original pain prior to surgery has resolved. Tolerating diet. No fevers no chills. Daily bowel movements.     Review of Systems    Past Medical History:   Diagnosis Date    End-stage renal disease on hemodialysis ([address]     since 2006, due to polycystic kidney disease    Hypertension     Hypertriglyceridemia        Past Surgical History:   Procedure Laterality Date    CHOLECYSTECTOMY      CHOLECYSTECTOMY, LAPAROSCOPIC N/A 4/13/2022    CHOLECYSTECTOMY LAPAROSCOPIC ROBOTIC performed by Bhavik Gilbert MD at [facility]    DIALYSIS FISTULA CREATION      THYROID LOBECTOMY  2012    [facility]       No family history on file. Allergies:  See list    Current Outpatient Medications   Medication Sig Dispense Refill    lanthanum (FOSRENOL) 1000 MG chewable tablet Take 1 tablet by mouth daily      Sucroferric Oxyhydroxide (VELPHORO) 500 MG CHEW Take 2 tablets by mouth      metoprolol succinate (TOPROL XL) 100 MG extended release tablet TAKE 1 TABLET BY MOUTH EVERY DAY 90 tablet 3    amLODIPine (NORVASC) 10 MG tablet TAKE 1 TABLET BY MOUTH EVERY DAY 90 tablet 3    lidocaine-prilocaine (EMLA) 2.5-2.5 % cream APPLY SMALL AMOUNT TO ACCESS SITE (AVF) 1 HOUR BEFORE DIALYSIS. COVER WITH OCCLUSIVE DRESSING (SARAN WRAP)      ergocalciferol (ERGOCALCIFEROL) 1.25 MG (13938 UT) capsule TAKE 1 CAPSULE BY MOUTH EVERY 2 WEEKS      hydrALAZINE (APRESOLINE) 50 MG tablet Take 1 tablet by mouth 2 times daily 60 tablet 5    sevelamer (RENVELA) 800 MG tablet Take 1 tablet by mouth 3 times daily (with meals)      gabapentin (NEURONTIN) 100 MG capsule Radha josseline capsula por via oral ajit veces a la semana despues de la dialisis. 30 capsule 2     No current facility-administered medications for this visit.        Social History     Socioeconomic History    Marital status:      Spouse name: None    Number of children: None    Years of education: None    Highest education level: None   Occupational History    None   Tobacco Use    Smoking status: Never Smoker    Smokeless tobacco: Never Used   Substance and Sexual Activity    Alcohol use: No     Alcohol/week: 0.0 standard drinks    Drug use: No    Sexual activity: None   Other Topics Concern    None   Social History Narrative    None     Social Determinants of Health     Financial Resource Strain: Low Risk     Difficulty of Paying Living Expenses: Not hard at all   Food Insecurity: No Food Insecurity    Worried About Running Out of Food in the Last Year: Never true   [employer] of Food in the Last Year: Never true   Transportation Needs:     Lack of Transportation (Medical): Not on file    Lack of Transportation (Non-Medical): Not on file   Physical Activity:     Days of Exercise per Week: Not on file    Minutes of Exercise per Session: Not on file   Stress:     Feeling of Stress : Not on file   Social Connections:     Frequency of Communication with Friends and Family: Not on file    Frequency of Social Gatherings with Friends and Family: Not on file    Attends Rastafarian Services: Not on file    Active Member of [address] [place] [address] [employer] or Organizations: Not on file    Attends Club or Organization Meetings: Not on file    Marital Status: Not on file   Intimate Partner Violence:     Fear of Current or Ex-Partner: Not on file    Emotionally Abused: Not on file    Physically Abused: Not on file    Sexually Abused: Not on file   Housing Stability:     Unable to Pay for Housing in the Last Year: Not on file    Number of [place] in the Last Year: Not on file    Unstable Housing in the Last Year: Not on file       /80   Pulse 73   Resp 18   Ht 5' 4\" (1.626 m)   Wt 164 lb (74.4 kg)   SpO2 97%   BMI 28.15 kg/m²      Physical Exam  Vitals and nursing note reviewed. Constitutional:       General: He is not in acute distress. Appearance: He is well-developed. HENT:      Head: Normocephalic and atraumatic. Eyes:      General: No scleral icterus. Conjunctiva/sclera: Conjunctivae normal.      Pupils: Pupils are equal, round, and reactive to light. Neck:      Trachea: No tracheal deviation. Cardiovascular:      Rate and Rhythm: Normal rate. Pulmonary:      Effort: Pulmonary effort is normal. No respiratory distress. Abdominal:      General: There is no distension. Palpations: Abdomen is soft. Tenderness: There is no abdominal tenderness. Comments: Wounds are clean, dry, intact. Skin:     General: Skin is warm and dry.    Neurological:      Mental Status: He is alert and oriented to person, place, and time. Psychiatric:         Behavior: Behavior normal.         Thought Content: Thought content normal.         Judgment: Judgment normal.         IMAGING/LABS    SURGICAL PATHOLOGY REPORT  Order: [id number]   Status: Final result     Visible to patient: No (not released)     Next appt: 08/24/2022 at 10:20 AM in Family Medicine Cornell Churchill, DO)     0 Result Notes    Component 4/13/22 9634   Surgical Pathology Report -- Diagnosis --   GALLBLADDER:        -SUBACUTE AND CHRONIC CHOLECYSTITIS.      -CHOLELITHIASIS.        Ekaterina Mcknight M.D.   **Electronically Signed Out**         [facility]/4/15/2022         Clinical Information   Pre-op Diagnosis:  GALLSTONE   Operative Findings:  [id number]   Operation Performed: [address] of Specimen   A: GALLBLADDER     Gross Description   \"PETE Boss Dolores" 10.5 x 4.0 x 2.5 cm focally   disrupted gallbladder with a 1.8 cm long x 0.4 cm in diameter cystic   duct.  The serosa is purple-gray, and the liver bed is coarse   tan-brown.  The wall is 0.1 cm in thickness, and the lumen contains   gray-white caculi, 4.0 x 2.0 x 1.8 cm in aggregate.  Multiple calculi   are impacted within the cystic duct and gallbladder neck.  The mucosa   is tan and focally granular.  Cystic duct margin and sections of   gallbladder mucosa including region of impaction 1cs.  tm       Microscopic Description   Microscopic examination performed.       SURGICAL PATHOLOGY CONSULTATION         Patient Name: My Tyson: [id number]   Path Number: [id number]     [address], [address] [place], [address]   [phone]   Fax: [phone]    [address]              Specimen Collected: 04/13/22 13:44 Last Resulted: 04/15/22 12:28        Lab Flowsheet     Order Details     View Encounter     Lab and Collection Details     Routing     Result                ASSESSMENT     Diagnosis Orders   1. Postop check     2. S/P laparoscopic cholecystectomy     3. BMI 28.0-28.9,adult     4. Uses [place]ese as primary spoken language         PLAN    Mr Rashawn Crowe is doing very well. Original pain prior to surgery has resolved. Continue gradual advancement of diet and activity as tolerated with no heavy lifting or abdominal straining for the next few weeks.   Follow-up next week for staple removal.     Danni Copeland MD

## 2022-04-25 NOTE — PATIENT INSTRUCTIONS
SURVEY:    You may be receiving a survey from [employer] regarding your visit today. Please complete the survey to enable us to provide the highest quality of care to you and your family. If you cannot score us a very good on any question, please call the office to discuss how we could have made your experience a very good one. Thank you.   [address] [place] [address]  MD Yohana Bowen MD Jerona Dach, MD Jackqulyn Grinder, MD Edsel Carte, MD Kathlene Pitter, Manuel Rai, [address] [place] [address] [place] [facility], [address] [place] [address], [address] [place] [address] ConnellyLASHAE Muhammad, [address] [place] [address]

## 2022-04-25 NOTE — PROGRESS NOTES
HPI     Mr. Rene Moura returns for follow-up after cholecystectomy.     DATE OF PROCEDURE:  04/13/2022     ATTENDING SURGEON:  Dr. Lilian Villa.     PRIMARY CARE PROVIDER: Cecilio Thomas DO     PREOPERATIVE DIAGNOSIS:  Symptomatic cholelithiasis.     POSTOPERATIVE DIAGNOSIS:  Cholecystitis with cholelithiasis.     PROCEDURE PERFORMED:  Laparoscopic cholecystectomy, robotic assist.     ANESTHESIA:  General endotracheal tube.     INTRAOPERATIVE FINDINGS:  As mentioned above, cystic duct and artery  clearly visualized and confirmed with intraoperative indocyanine green  fluorescence.  Good postoperative hemostasis.     INDICATIONS:  The patient is a pleasant 81-year-old  male with a  long history of biliary colic.  Workup showing hepatic steatosis  cholelithiasis without cholecystitis nor biliary obstruction.  History  of polycystic kidney disease with end-stage renal failure requiring  hemodialysis since 2006.  A thyroid lobectomy in 2012.  BMI of 28.  At  this time, elective cholecystectomy is indicated.     He is doing well. Original pain prior to surgery has resolved. Tolerating diet. No fevers no chills.   Daily bowel movements.     Review of Systems     Past Medical History        Past Medical History:   Diagnosis Date    End-stage renal disease on hemodialysis ([facility] [address]       since 2006, due to polycystic kidney disease    Hypertension      Hypertriglyceridemia              Past Surgical History         Past Surgical History:   Procedure Laterality Date    CHOLECYSTECTOMY        CHOLECYSTECTOMY, LAPAROSCOPIC N/A 4/13/2022     CHOLECYSTECTOMY LAPAROSCOPIC ROBOTIC performed by Mary Burnett MD at [address] [place] [address]   2012     [facility]            Family History   No family history on file.        Allergies:  See list     Current Facility-Administered Medications          Current Outpatient Medications   Medication Sig Dispense Refill    lanthanum (FOSRENOL) 1000 MG chewable tablet Take 1 tablet by mouth daily        Sucroferric Oxyhydroxide (VELPHORO) 500 MG CHEW Take 2 tablets by mouth        metoprolol succinate (TOPROL XL) 100 MG extended release tablet TAKE 1 TABLET BY MOUTH EVERY DAY 90 tablet 3    amLODIPine (NORVASC) 10 MG tablet TAKE 1 TABLET BY MOUTH EVERY DAY 90 tablet 3    lidocaine-prilocaine (EMLA) 2.5-2.5 % cream APPLY SMALL AMOUNT TO ACCESS SITE (AVF) 1 HOUR BEFORE DIALYSIS. COVER WITH OCCLUSIVE DRESSING (SARAN WRAP)        ergocalciferol (ERGOCALCIFEROL) 1.25 MG (26148 UT) capsule TAKE 1 CAPSULE BY MOUTH EVERY 2 WEEKS        hydrALAZINE (APRESOLINE) 50 MG tablet Take 1 tablet by mouth 2 times daily 60 tablet 5    sevelamer (RENVELA) 800 MG tablet Take 1 tablet by mouth 3 times daily (with meals)        gabapentin (NEURONTIN) 100 MG capsule Radha josseline capsula por via oral ajit veces a la semana despues de la dialisis. 30 capsule 2      No current facility-administered medications for this visit.            Social History   Social History            Socioeconomic History    Marital status:        Spouse name: None    Number of children: None    Years of education: None    Highest education level: None   Occupational History    None   Tobacco Use    Smoking status: Never Smoker    Smokeless tobacco: Never Used   Substance and Sexual Activity    Alcohol use: No       Alcohol/week: 0.0 standard drinks    Drug use: No    Sexual activity: None   Other Topics Concern    None   Social History Narrative    None      Social Determinants of Health          Financial Resource Strain: Low Risk     Difficulty of Paying Living Expenses: Not hard at all   Food Insecurity: No Food Insecurity    Worried About Running Out of Food in the Last Year: Never true    [employer] of Food in the Last Year: Never true   Transportation Needs:     Lack of Transportation (Medical): Not on file    Lack of Transportation (Non-Medical):  Not on file Physical Activity:     Days of Exercise per Week: Not on file    Minutes of Exercise per Session: Not on file   Stress:     Feeling of Stress : Not on file   Social Connections:     Frequency of Communication with Friends and Family: Not on file    Frequency of Social Gatherings with Friends and Family: Not on file    Attends [employer] Services: Not on file    Active Member of [address] [place] [address] or Organizations: Not on file    Attends Club or Organization Meetings: Not on file    Marital Status: Not on file   Intimate Partner Violence:     Fear of Current or Ex-Partner: Not on file    Emotionally Abused: Not on file    Physically Abused: Not on file    Sexually Abused: Not on file   Housing Stability:     Unable to Pay for Housing in the Last Year: Not on file    Number of [place] in the Last Year: Not on file    Unstable Housing in the Last Year: Not on file            /80   Pulse 73   Resp 18   Ht 5' 4\" (1.626 m)   Wt 164 lb (74.4 kg)   SpO2 97%   BMI 28.15 kg/m²       Physical Exam  Vitals and nursing note reviewed. Constitutional:       General: He is not in acute distress. Appearance: He is well-developed. HENT:      Head: Normocephalic and atraumatic. Eyes:      General: No scleral icterus. Conjunctiva/sclera: Conjunctivae normal.      Pupils: Pupils are equal, round, and reactive to light. Neck:      Trachea: No tracheal deviation. Cardiovascular:      Rate and Rhythm: Normal rate. Pulmonary:      Effort: Pulmonary effort is normal. No respiratory distress. Abdominal:      General: There is no distension. Palpations: Abdomen is soft. Tenderness: There is no abdominal tenderness. Comments: Wounds are clean, dry, intact. Skin:     General: Skin is warm and dry. Neurological:      Mental Status: He is alert and oriented to person, place, and time. Psychiatric:         Behavior: Behavior normal.         Thought Content:  Thought content normal. Judgment: Judgment normal.            IMAGING/LABS     SURGICAL PATHOLOGY REPORT  Order: [id number]   Status: Final result     Visible to patient: No (not released)     Next appt: 08/24/2022 at 10:20 AM in [facility] Medicine Claire KatyaDO)     0 Result Notes     Component 4/13/22 1344   Surgical Pathology Report -- Diagnosis --   GALLBLADDER:        -SUBACUTE AND CHRONIC CHOLECYSTITIS.      -CHOLELITHIASIS. Reinier Guzmán M.D.   **Electronically Signed Out**         [facility]/4/15/2022         Clinical Information   Pre-op Diagnosis:  GALLSTONE   Operative Findings:  [id number]   Operation Performed: [address] of Specimen   A: GALLBLADDER     Gross Description   \"PETE [place]el[place]" 10.5 x 4.0 x 2.5 cm focally   disrupted gallbladder with a 1.8 cm long x 0.4 cm in diameter cystic   duct.  The serosa is purple-gray, and the liver bed is coarse   tan-brown.  The wall is 0.1 cm in thickness, and the lumen contains   gray-white caculi, 4.0 x 2.0 x 1.8 cm in aggregate.  Multiple calculi   are impacted within the cystic duct and gallbladder neck.  The mucosa   is tan and focally granular.  Cystic duct margin and sections of   gallbladder mucosa including region of impaction 1cs.  tm       Microscopic Description   Microscopic examination performed.       SURGICAL PATHOLOGY CONSULTATION         Patient Name: Misit Childs: [id number]   Path Number: [id number]     [address], [address] [place], [address]   [phone]   Fax: [phone]    [address]                Specimen Collected: 04/13/22 13:44 Last Resulted: 04/15/22 12:28        Lab Flowsheet     Order Details     View Encounter     Lab and Collection Details     Routing     Result                   ASSESSMENT       Diagnosis Orders   1. Postop check      2.  S/P laparoscopic cholecystectomy      3. BMI 28.0-28.9,adult      4. Uses American as primary spoken language            PLAN     Mr Rene Moura is doing very well. Original pain prior to surgery has resolved. Continue gradual advancement of diet and activity as tolerated with no heavy lifting or abdominal straining for the next few weeks. Follow-up prn.      Mary Burnett MD

## 2022-07-07 NOTE — TELEPHONE ENCOUNTER
Last OV: 2/24/2022 chronic  Last RX:    Next scheduled apt: 8/24/2022 wellness           Pt requesting a refill to [facility]

## 2022-07-08 RX ORDER — METOPROLOL SUCCINATE 100 MG/1
TABLET, EXTENDED RELEASE ORAL
Qty: 90 TABLET | Refills: 3 | Status: SHIPPED | OUTPATIENT
Start: 2022-07-08

## 2022-07-08 RX ORDER — AMLODIPINE BESYLATE 10 MG/1
TABLET ORAL
Qty: 90 TABLET | Refills: 3 | Status: SHIPPED | OUTPATIENT
Start: 2022-07-08

## 2022-08-28 ENCOUNTER — APPOINTMENT (OUTPATIENT)
Dept: GENERAL RADIOLOGY | Age: 47
End: 2022-08-28
Payer: MEDICARE

## 2022-08-28 ENCOUNTER — HOSPITAL ENCOUNTER (EMERGENCY)
Age: 47
Discharge: HOME | End: 2022-08-28
Attending: FAMILY MEDICINE
Payer: MEDICARE

## 2022-08-28 VITALS
OXYGEN SATURATION: 99 % | HEART RATE: 91 BPM | TEMPERATURE: 98.8 F | HEIGHT: 64 IN | SYSTOLIC BLOOD PRESSURE: 142 MMHG | DIASTOLIC BLOOD PRESSURE: 89 MMHG | BODY MASS INDEX: 28.17 KG/M2 | WEIGHT: 165 LBS | RESPIRATION RATE: 16 BRPM

## 2022-08-28 DIAGNOSIS — U07.1 COVID-19: Primary | ICD-10-CM

## 2022-08-28 LAB
SARS-COV-2, RAPID: DETECTED
SPECIMEN DESCRIPTION: ABNORMAL

## 2022-08-28 PROCEDURE — 99284 EMERGENCY DEPT VISIT MOD MDM: CPT

## 2022-08-28 PROCEDURE — 87635 SARS-COV-2 COVID-19 AMP PRB: CPT

## 2022-08-28 PROCEDURE — C9803 HOPD COVID-19 SPEC COLLECT: HCPCS

## 2022-08-28 PROCEDURE — 71045 X-RAY EXAM CHEST 1 VIEW: CPT

## 2022-08-28 ASSESSMENT — PAIN - FUNCTIONAL ASSESSMENT: PAIN_FUNCTIONAL_ASSESSMENT: 0-10

## 2022-08-28 ASSESSMENT — PAIN SCALES - GENERAL: PAINLEVEL_OUTOF10: 4

## 2022-08-28 ASSESSMENT — PAIN DESCRIPTION - PAIN TYPE: TYPE: ACUTE PAIN

## 2022-08-28 ASSESSMENT — PAIN DESCRIPTION - LOCATION: LOCATION: THROAT

## 2022-08-28 ASSESSMENT — PAIN DESCRIPTION - FREQUENCY: FREQUENCY: CONTINUOUS

## 2022-08-28 ASSESSMENT — PAIN DESCRIPTION - DESCRIPTORS: DESCRIPTORS: ACHING

## 2022-08-28 NOTE — ED PROVIDER NOTES
[facility]  eMERGENCY dEPARTMENT eNCOUnter          CHIEF COMPLAINT       Chief Complaint   Patient presents with    Pharyngitis     Since yesterday     Cough     Started yesterday - was around someone with Adrián recently        Nurses Notes reviewed and I agree except as noted in the HPI. HISTORY OF PRESENT ILLNESS    Houston Lopez is a 56 y.o. male who presents the emergency room via private vehicle, patient complaining of sore throat and cough, onset over the past day, denies any fever, states little bit of congestion. Patient concerns at positive sick contact individual with COVID. Denies any vomiting diarrhea or rash. REVIEW OF SYSTEMS     Review of Systems   All other systems reviewed and are negative. PAST MEDICAL HISTORY    has a past medical history of End-stage renal disease on hemodialysis ([facility] [address] Hypertension, and Hypertriglyceridemia. SURGICAL HISTORY      has a past surgical history that includes Thyroid lobectomy (2012); Dialysis fistula creation; Cholecystectomy; and Cholecystectomy, laparoscopic (N/A, 4/13/2022). CURRENT MEDICATIONS       Current Discharge Medication List        CONTINUE these medications which have NOT CHANGED    Details   amLODIPine (NORVASC) 10 MG tablet TAKE 1 TABLET BY MOUTH EVERY DAY  Qty: 90 tablet, Refills: 3      metoprolol succinate (TOPROL XL) 100 MG extended release tablet TAKE 1 TABLET BY MOUTH EVERY DAY  Qty: 90 tablet, Refills: 3      gabapentin (NEURONTIN) 100 MG capsule Radha josseline capsula por via oral ajit veces a la semana despues de la dialisis. Qty: 30 capsule, Refills: 2    Associated Diagnoses: Bilateral leg pain      lanthanum (FOSRENOL) 1000 MG chewable tablet Take 1 tablet by mouth daily      Sucroferric Oxyhydroxide (VELPHORO) 500 MG CHEW Take 2 tablets by mouth      lidocaine-prilocaine (EMLA) 2.5-2.5 % cream APPLY SMALL AMOUNT TO ACCESS SITE (AVF) 1 HOUR BEFORE DIALYSIS.  COVER WITH OCCLUSIVE DRESSING (SARAN WRAP)      ergocalciferol (ERGOCALCIFEROL) 1.25 MG (84282 UT) capsule TAKE 1 CAPSULE BY MOUTH EVERY 2 WEEKS      hydrALAZINE (APRESOLINE) 50 MG tablet Take 1 tablet by mouth 2 times daily  Qty: 60 tablet, Refills: 5      sevelamer (RENVELA) 800 MG tablet Take 1 tablet by mouth 3 times daily (with meals)             ALLERGIES     is allergic to sensipar  [cinacalcet] and iodine. FAMILY HISTORY     has no family status information on file. family history is not on file. SOCIAL HISTORY      reports that he has never smoked. He has never used smokeless tobacco. He reports that he does not drink alcohol and does not use drugs. PHYSICAL EXAM     INITIAL VITALS:  height is 5' 4\" (1.626 m) and weight is 165 lb (74.8 kg). His temperature is 98.8 °F (37.1 °C). His blood pressure is 142/89 (abnormal) and his pulse is 91. His respiration is 16 and oxygen saturation is 99%. Physical Exam   Constitutional: Patient is oriented to person, place, and time. Patient appears well-developed and well-nourished. Patient is active and cooperative. HENT:   Head: Normocephalic and atraumatic. Head is without contusion. Right Ear: Hearing and external ear normal. No drainage. Left Ear: Hearing and external ear normal. No drainage. Nose: Nose normal. No nasal deformity. No epistaxis. Mouth/Throat: Mucous membranes are not dry. Negative oral lesions or exudate  Eyes: EOMI. Conjunctivae, sclera, and lids are normal. Right eye exhibits no discharge. Left eye exhibits no discharge. Neck: Full passive range of motion without pain and phonation normal.   Cardiovascular:  Normal rate, regular rhythm and intact distal pulses. No lower extremity edema. Left upper extremity dialysis site with noted to thrill  Pulses: Right radial pulse  2+   Pulmonary/Chest: Effort normal. No tachypnea and no bradypnea. Fine central coarseness without gross rhonchi rales or stridor  Abdominal: Soft.  Patient without distension or tenderness  Musculoskeletal:   Negative acute trauma or deformity,  apparent full range of motion and normal strength all extremities appropriate to age. Neurological: Patient is alert and oriented to person, place, and time. patient displays no tremor. Patient displays no seizure activity. .  Lymphatic: No gross cervical lymphadenopathy  Skin: Skin is warm and dry. Patient is not diaphoretic. Psychiatric: Patient has a normal mood and affect. Patient speech is normal and behavior is normal. Cognition and memory are normal.     DIFFERENTIAL DIAGNOSIS:   Pneumonia bronchitis URI, viral illness NOS    DIAGNOSTIC RESULTS           RADIOLOGY: non-plain film images(s) such as CT, Ultrasound and MRI are read by the radiologist.  XR CHEST PORTABLE   Final Result      No acute cardiopulmonary findings.                    LABS:   Labs Reviewed   COVID-19, RAPID - Abnormal; Notable for the following components:       Result Value    SARS-CoV-2, Rapid DETECTED (*)     All other components within normal limits       EMERGENCY DEPARTMENT COURSE:   Vitals:    Vitals:    08/28/22 0953 08/28/22 0955   BP: (!) 142/89    Pulse: 91    Resp: 16    Temp:  98.8 °F (37.1 °C)   TempSrc: Oral    SpO2: 99%    Weight: 165 lb (74.8 kg)    Height: 5' 4\" (1.626 m)      Patient history and physical exam taken with patient upright in chair, found member acting as , discussed patient symptoms and exam findings, discussed getting chest x-ray, rapid COVID test, and will reevaluate, acknowledged    Chest x-ray reviewed    Rapid COVID-positive    Discussed with patient and patient  diagnosis COVID-19, given the patient is a dialysis patient with placement high risk category, however under guidelines Paxlovid or medications not currently recommended for dialysis patients, will contact patient's PCP to see if we get him set up for monoclonal antibody infusion, in the interim discussed Tylenol for any fevers, Mucinex DM or similar for some symptom control if needed, discussed prevention spread to others, reason to return the emergency room, otherwise outpatient follow-up, acknowledged    I did get a message via thinkingphones to patient's PCP, advising that I am having him call the office first thing the morning to arrange for outpatient infusion, acknowledged    Reviewed at time of visit, 8/28/2022 @ 1015 hrs:    https://clinicaltrials.gov/ct2/show/JKM99479241      FINAL IMPRESSION      1. COVID-19          DISPOSITION/PLAN   Discharge    PATIENT REFERRED TO:  Dorys Clarke DO  [address] [address] [phone]    Call       [facility]  [address]   Call   As needed      DISCHARGE MEDICATIONS:  Current Discharge Medication List              Summation      Patient Course: Discharge    ED Medications administered this visit:  Medications - No data to display    New Prescriptions from this visit:    Current Discharge Medication List          Follow-up:  Dorys Clarke DO  [address] [facility] [id number] [phone]    Call       [facility] ED  [address]   Call   As needed        Final Impression:   1.  COVID-19               (Please note that portions of this note were completed with a voice recognition program.  Efforts were made to edit the dictations but occasionally words are mis-transcribed.)    MD Claudette Sharpe MD  08/28/22 5804

## 2022-08-28 NOTE — ED TRIAGE NOTES
Pt able to speak and understand English well. Patient does state he would like his daughter to translate to him in Israeli language. Home med list complete with patient verbal recall.

## 2023-08-18 ENCOUNTER — HOSPITAL ENCOUNTER (EMERGENCY)
Age: 48
Discharge: HOME | End: 2023-08-18
Attending: FAMILY MEDICINE
Payer: MEDICARE

## 2023-08-18 VITALS
OXYGEN SATURATION: 98 % | WEIGHT: 165 LBS | DIASTOLIC BLOOD PRESSURE: 91 MMHG | HEIGHT: 64 IN | BODY MASS INDEX: 28.17 KG/M2 | HEART RATE: 67 BPM | RESPIRATION RATE: 18 BRPM | TEMPERATURE: 98.5 F | SYSTOLIC BLOOD PRESSURE: 142 MMHG

## 2023-08-18 DIAGNOSIS — I16.0 ASYMPTOMATIC HYPERTENSIVE URGENCY: Primary | ICD-10-CM

## 2023-08-18 PROCEDURE — 6370000000 HC RX 637 (ALT 250 FOR IP): Performed by: FAMILY MEDICINE

## 2023-08-18 PROCEDURE — 99283 EMERGENCY DEPT VISIT LOW MDM: CPT

## 2023-08-18 RX ORDER — HYDRALAZINE HYDROCHLORIDE 25 MG/1
50 TABLET, FILM COATED ORAL ONCE
Status: COMPLETED | OUTPATIENT
Start: 2023-08-18 | End: 2023-08-18

## 2023-08-18 RX ORDER — AMLODIPINE BESYLATE 10 MG/1
10 TABLET ORAL ONCE
Status: COMPLETED | OUTPATIENT
Start: 2023-08-18 | End: 2023-08-18

## 2023-08-18 RX ADMIN — AMLODIPINE BESYLATE 10 MG: 10 TABLET ORAL at 12:17

## 2023-08-18 RX ADMIN — HYDRALAZINE HYDROCHLORIDE 50 MG: 25 TABLET, FILM COATED ORAL at 12:17

## 2023-08-18 ASSESSMENT — ENCOUNTER SYMPTOMS
NAUSEA: 0
SHORTNESS OF BREATH: 0

## 2023-08-18 ASSESSMENT — PAIN - FUNCTIONAL ASSESSMENT: PAIN_FUNCTIONAL_ASSESSMENT: NONE - DENIES PAIN

## 2023-08-18 ASSESSMENT — LIFESTYLE VARIABLES
HOW MANY STANDARD DRINKS CONTAINING ALCOHOL DO YOU HAVE ON A TYPICAL DAY: PATIENT DOES NOT DRINK
HOW OFTEN DO YOU HAVE A DRINK CONTAINING ALCOHOL: NEVER

## 2023-08-18 NOTE — ED PROVIDER NOTES
[address]      Pt Name: Lena Beth  MRN: [id number]  [address] [facility] [place] 1975  Date of evaluation: 8/18/2023  Provider: Zafar Huertas MD    CHIEF COMPLAINT       Chief Complaint   Patient presents with    Hypertension     Sent over by Alissa, patient has at dialysis when his BP was high. HISTORY OF PRESENT ILLNESS      Lena Beth is a 53 y.o. male who presents to the emergency department via private vehicle with found members x2, patient was at dialysis and had full session -30 minutes today, was found to have elevated blood pressures, and was told to go to the emergency room for evaluation. Per nursing report, dialysis staff was unable to perform manual blood pressures    Nephro: Dr. Alyce Piña  PCP: Dr. Randa Parikh of [facility]   Respiratory:  Negative for shortness of breath. Cardiovascular:  Negative for chest pain, palpitations and leg swelling. Gastrointestinal:  Negative for nausea. Neurological:  Negative for dizziness and headaches. All other systems reviewed and are negative.       PAST MEDICAL HISTORY     Past Medical History:   Diagnosis Date    End-stage renal disease on hemodialysis ([address])     since 2006, due to polycystic kidney disease    Hypertension     Hypertriglyceridemia          SURGICAL HISTORY       Past Surgical History:   Procedure Laterality Date    CHOLECYSTECTOMY      CHOLECYSTECTOMY, LAPAROSCOPIC N/A 4/13/2022    CHOLECYSTECTOMY LAPAROSCOPIC ROBOTIC performed by Stefany Fleming MD at [facility]       Current Discharge Medication List        CONTINUE these medications which have NOT CHANGED    Details   amLODIPine (NORVASC) 10 MG tablet TAKE 1 TABLET BY MOUTH EVERY DAY  Qty: 90 tablet, Refills: 3      metoprolol succinate (TOPROL XL) 100 MG extended release tablet TAKE 1 TABLET BY

## 2023-08-18 NOTE — ED TRIAGE NOTES
Patient states he has not been taking his BP medications since 2 days ago. Patient states his nephrologist told him to take BP medications every other day when he does not have dialysis. Patient denies chest pain/headache/dizziness/SOB.

## 2023-08-28 RX ORDER — METOPROLOL SUCCINATE 100 MG/1
TABLET, EXTENDED RELEASE ORAL
Qty: 90 TABLET | Refills: 0 | Status: SHIPPED | OUTPATIENT
Start: 2023-08-28

## 2023-08-28 RX ORDER — AMLODIPINE BESYLATE 10 MG/1
TABLET ORAL
Qty: 90 TABLET | Refills: 0 | Status: SHIPPED | OUTPATIENT
Start: 2023-08-28

## 2023-08-28 NOTE — TELEPHONE ENCOUNTER
Last OV: 2/24/2022  chronic   Last RX:    Next scheduled apt: Visit date not found            Surescript requesting a refill

## 2023-11-10 ENCOUNTER — HOSPITAL ENCOUNTER (EMERGENCY)
Age: 48
Discharge: HOME | End: 2023-11-10
Attending: FAMILY MEDICINE
Payer: MEDICARE

## 2023-11-10 VITALS
BODY MASS INDEX: 27.25 KG/M2 | SYSTOLIC BLOOD PRESSURE: 156 MMHG | OXYGEN SATURATION: 97 % | DIASTOLIC BLOOD PRESSURE: 84 MMHG | TEMPERATURE: 98.3 F | RESPIRATION RATE: 16 BRPM | WEIGHT: 158.73 LBS | HEART RATE: 66 BPM

## 2023-11-10 DIAGNOSIS — H10.32 ACUTE CONJUNCTIVITIS OF LEFT EYE, UNSPECIFIED ACUTE CONJUNCTIVITIS TYPE: Primary | ICD-10-CM

## 2023-11-10 PROCEDURE — 99283 EMERGENCY DEPT VISIT LOW MDM: CPT

## 2023-11-10 RX ORDER — OLOPATADINE HYDROCHLORIDE 1 MG/ML
1 SOLUTION/ DROPS OPHTHALMIC 2 TIMES DAILY
Qty: 5 ML | Refills: 0 | Status: SHIPPED | OUTPATIENT
Start: 2023-11-10 | End: 2023-12-10

## 2023-11-10 RX ORDER — KETOROLAC TROMETHAMINE 4 MG/ML
1 SOLUTION/ DROPS OPHTHALMIC 4 TIMES DAILY
Qty: 5 ML | Refills: 0 | Status: SHIPPED | OUTPATIENT
Start: 2023-11-10

## 2023-11-10 ASSESSMENT — PATIENT HEALTH QUESTIONNAIRE - PHQ9
SUM OF ALL RESPONSES TO PHQ9 QUESTIONS 1 & 2: 0
SUM OF ALL RESPONSES TO PHQ QUESTIONS 1-9: 0
1. LITTLE INTEREST OR PLEASURE IN DOING THINGS: 0
SUM OF ALL RESPONSES TO PHQ QUESTIONS 1-9: 0
2. FEELING DOWN, DEPRESSED OR HOPELESS: 0
SUM OF ALL RESPONSES TO PHQ QUESTIONS 1-9: 0
SUM OF ALL RESPONSES TO PHQ QUESTIONS 1-9: 0

## 2023-11-10 ASSESSMENT — PAIN DESCRIPTION - LOCATION: LOCATION: EYE

## 2023-11-10 ASSESSMENT — PAIN DESCRIPTION - PAIN TYPE: TYPE: ACUTE PAIN

## 2023-11-10 ASSESSMENT — PAIN DESCRIPTION - DESCRIPTORS: DESCRIPTORS: ACHING

## 2023-11-10 ASSESSMENT — PAIN DESCRIPTION - ORIENTATION: ORIENTATION: LEFT

## 2023-11-10 ASSESSMENT — PAIN SCALES - GENERAL: PAINLEVEL_OUTOF10: 6

## 2023-11-10 ASSESSMENT — LIFESTYLE VARIABLES: HOW OFTEN DO YOU HAVE A DRINK CONTAINING ALCOHOL: NEVER

## 2023-11-10 ASSESSMENT — PAIN - FUNCTIONAL ASSESSMENT: PAIN_FUNCTIONAL_ASSESSMENT: 0-10

## 2023-11-11 NOTE — ED PROVIDER NOTES
[address]      Pt Name: Shey Calixto  MRN: [id number]  [address] [facility] [place] 1975  Date of evaluation: 11/10/2023  Provider: Mir Keita MD    CHIEF COMPLAINT       Chief Complaint   Patient presents with    Eye Pain     Right  eye red  last week and this week his left eye is red and painful         HISTORY OF PRESENT ILLNESS      Shey Calixto is a 53 y.o. male who presents to the emergency department via private vehicle with daughter who is acting as  as patient speaks minimal English, patient complaining of redness to his left eye, onset earlier this week, states a week prior it was more in his right eye, patient denies any gross drainage from his eye, however he does begin to have him some discomfort, rating it 6 out of 10. Patient does not wear glasses or contacts. Denies any known exposures or chemical irritations to the eye. REVIEW OF SYSTEMS       Review of Systems   HENT:  Positive for ear pain. All other systems reviewed and are negative.         PAST MEDICAL HISTORY     Past Medical History:   Diagnosis Date    End-stage renal disease on hemodialysis ([address] [facility])     since 2006, due to polycystic kidney disease    Hypertension     Hypertriglyceridemia          SURGICAL HISTORY       Past Surgical History:   Procedure Laterality Date    CHOLECYSTECTOMY      CHOLECYSTECTOMY, LAPAROSCOPIC N/A 4/13/2022    CHOLECYSTECTOMY LAPAROSCOPIC ROBOTIC performed by Joann Fuentes MD at [facility]       Discharge Medication List as of 11/10/2023  6:52 PM        CONTINUE these medications which have NOT CHANGED    Details   amLODIPine (NORVASC) 10 MG tablet TAKE 1 TABLET BY MOUTH EVERY DAY, Disp-90 tablet, R-0Normal      metoprolol succinate (TOPROL XL) 100 MG extended release tablet TAKE 1 TABLET BY MOUTH EVERY DAY, Disp-90 tablet, R-0Normal      gabapentin

## 2024-01-18 RX ORDER — AMLODIPINE BESYLATE 10 MG/1
10 TABLET ORAL DAILY
Qty: 7 TABLET | Refills: 0 | Status: SHIPPED | OUTPATIENT
Start: 2024-01-18

## 2024-01-23 ENCOUNTER — OFFICE VISIT (OUTPATIENT)
Dept: FAMILY MEDICINE CLINIC | Age: 49
End: 2024-01-23
Payer: MEDICARE

## 2024-01-23 VITALS
DIASTOLIC BLOOD PRESSURE: 74 MMHG | WEIGHT: 161 LBS | HEIGHT: 64 IN | HEART RATE: 62 BPM | OXYGEN SATURATION: 96 % | BODY MASS INDEX: 27.49 KG/M2 | SYSTOLIC BLOOD PRESSURE: 152 MMHG

## 2024-01-23 DIAGNOSIS — L29.9 PRURITUS: ICD-10-CM

## 2024-01-23 DIAGNOSIS — Z99.2 ESRD (END STAGE RENAL DISEASE) ON DIALYSIS (HCC): ICD-10-CM

## 2024-01-23 DIAGNOSIS — Z00.00 INITIAL MEDICARE ANNUAL WELLNESS VISIT: Primary | ICD-10-CM

## 2024-01-23 DIAGNOSIS — N25.81 SECONDARY HYPERPARATHYROIDISM (HCC): ICD-10-CM

## 2024-01-23 DIAGNOSIS — N18.6 ESRD (END STAGE RENAL DISEASE) ON DIALYSIS (HCC): ICD-10-CM

## 2024-01-23 DIAGNOSIS — I15.9 SECONDARY HYPERTENSION: ICD-10-CM

## 2024-01-23 DIAGNOSIS — R19.8 STOMACH GROWLING: ICD-10-CM

## 2024-01-23 PROBLEM — K80.20 SYMPTOMATIC CHOLELITHIASIS: Status: RESOLVED | Noted: 2022-04-13 | Resolved: 2024-01-23

## 2024-01-23 PROCEDURE — 99214 OFFICE O/P EST MOD 30 MIN: CPT | Performed by: FAMILY MEDICINE

## 2024-01-23 PROCEDURE — G0438 PPPS, INITIAL VISIT: HCPCS | Performed by: FAMILY MEDICINE

## 2024-01-23 RX ORDER — FERRIC CITRATE 210 MG/1
2 TABLET, COATED ORAL
COMMUNITY
Start: 2023-11-29

## 2024-01-23 RX ORDER — AMLODIPINE BESYLATE 10 MG/1
10 TABLET ORAL DAILY
Qty: 90 TABLET | Refills: 3 | Status: SHIPPED | OUTPATIENT
Start: 2024-01-23

## 2024-01-23 RX ORDER — HYDROXYZINE HYDROCHLORIDE 25 MG/1
25 TABLET, FILM COATED ORAL EVERY 8 HOURS PRN
Qty: 60 TABLET | Refills: 0 | Status: SHIPPED | OUTPATIENT
Start: 2024-01-23

## 2024-01-23 RX ORDER — METOPROLOL SUCCINATE 100 MG/1
100 TABLET, EXTENDED RELEASE ORAL DAILY
Qty: 90 TABLET | Refills: 3 | Status: SHIPPED | OUTPATIENT
Start: 2024-01-23

## 2024-01-23 SDOH — ECONOMIC STABILITY: FOOD INSECURITY: WITHIN THE PAST 12 MONTHS, YOU WORRIED THAT YOUR FOOD WOULD RUN OUT BEFORE YOU GOT MONEY TO BUY MORE.: NEVER TRUE

## 2024-01-23 SDOH — HEALTH STABILITY: PHYSICAL HEALTH: ON AVERAGE, HOW MANY DAYS PER WEEK DO YOU ENGAGE IN MODERATE TO STRENUOUS EXERCISE (LIKE A BRISK WALK)?: 0 DAYS

## 2024-01-23 SDOH — ECONOMIC STABILITY: HOUSING INSECURITY
IN THE LAST 12 MONTHS, WAS THERE A TIME WHEN YOU DID NOT HAVE A STEADY PLACE TO SLEEP OR SLEPT IN A SHELTER (INCLUDING NOW)?: NO

## 2024-01-23 SDOH — ECONOMIC STABILITY: FOOD INSECURITY: WITHIN THE PAST 12 MONTHS, THE FOOD YOU BOUGHT JUST DIDN'T LAST AND YOU DIDN'T HAVE MONEY TO GET MORE.: NEVER TRUE

## 2024-01-23 SDOH — ECONOMIC STABILITY: INCOME INSECURITY: HOW HARD IS IT FOR YOU TO PAY FOR THE VERY BASICS LIKE FOOD, HOUSING, MEDICAL CARE, AND HEATING?: NOT HARD AT ALL

## 2024-01-23 ASSESSMENT — LIFESTYLE VARIABLES
HOW OFTEN DO YOU HAVE SIX OR MORE DRINKS ON ONE OCCASION: 1
HOW MANY STANDARD DRINKS CONTAINING ALCOHOL DO YOU HAVE ON A TYPICAL DAY: 0
HOW OFTEN DO YOU HAVE A DRINK CONTAINING ALCOHOL: NEVER
HOW MANY STANDARD DRINKS CONTAINING ALCOHOL DO YOU HAVE ON A TYPICAL DAY: PATIENT DOES NOT DRINK
HOW OFTEN DO YOU HAVE A DRINK CONTAINING ALCOHOL: 1

## 2024-01-23 ASSESSMENT — PATIENT HEALTH QUESTIONNAIRE - PHQ9
SUM OF ALL RESPONSES TO PHQ9 QUESTIONS 1 & 2: 0
SUM OF ALL RESPONSES TO PHQ QUESTIONS 1-9: 0
SUM OF ALL RESPONSES TO PHQ QUESTIONS 1-9: 0
1. LITTLE INTEREST OR PLEASURE IN DOING THINGS: 0
2. FEELING DOWN, DEPRESSED OR HOPELESS: 0
SUM OF ALL RESPONSES TO PHQ QUESTIONS 1-9: 0
SUM OF ALL RESPONSES TO PHQ QUESTIONS 1-9: 0

## 2024-01-23 NOTE — PROGRESS NOTES
Medicare Annual Wellness Visit    Estevan Marie is here for Medicare AWV, Hypertension, Gastroesophageal Reflux, and Pruritis (Skin itches a lot, worse at night. No change in detergents)    Assessment & Plan   Initial Medicare annual wellness visit    Recommendations for Preventive Services Due: see orders and patient instructions/AVS.  Recommended screening schedule for the next 5-10 years is provided to the patient in written form: see Patient Instructions/AVS.     No follow-ups on file.     Subjective       Patient's complete Health Risk Assessment and screening values have been reviewed and are found in Flowsheets. The following problems were reviewed today and where indicated follow up appointments were made and/or referrals ordered.    Positive Risk Factor Screenings with Interventions:                 Dentist Screen:  Have you seen the dentist within the past year?: (!) No    Intervention:  Patient declines any further evaluation or treatment        Advanced Directives:  Do you have a Living Will?: (!) No    Intervention:  has NO advanced directive - not interested in additional information                 Objective   Vitals:    01/23/24 0820 01/23/24 0836   BP: (!) 160/70 (!) 152/74   Pulse: 62    SpO2: 96%    Weight: 73 kg (161 lb)    Height: 1.626 m (5' 4.02\")       Body mass index is 27.62 kg/m².             Allergies   Allergen Reactions    Sensipar  [Cinacalcet]      Other reaction(s): Unknown    Iodine Itching     Prior to Visit Medications    Medication Sig Taking? Authorizing Provider   AURYXIA 1  MG(Fe) TABS tablet Take 2 tablets by mouth Yes Provider, MD Brandon   amLODIPine (NORVASC) 10 MG tablet Take 1 tablet by mouth daily Yes Marley Simmons DO   metoprolol succinate (TOPROL XL) 100 MG extended release tablet Take 1 tablet by mouth daily Yes Marley Simmons DO   hydrOXYzine HCl (ATARAX) 25 MG tablet Take 1 tablet by mouth every 8 hours as needed for Itching May cause 
extended release tablet Take 1 tablet by mouth daily 1/23/24  Yes Marley Simmons DO   hydrOXYzine HCl (ATARAX) 25 MG tablet Take 1 tablet by mouth every 8 hours as needed for Itching May cause drowsiness 1/23/24  Yes Marley Simmons DO   lanthanum (FOSRENOL) 1000 MG chewable tablet Take 1 tablet by mouth daily 6/18/21  Yes Brandon Saleem MD   Sucroferric Oxyhydroxide (VELPHORO) 500 MG CHEW Take 2 tablets by mouth 2/12/21  Yes ProviderBrandon MD   lidocaine-prilocaine (EMLA) 2.5-2.5 % cream APPLY SMALL AMOUNT TO ACCESS SITE (AVF) 1 HOUR BEFORE DIALYSIS. COVER WITH OCCLUSIVE DRESSING (SARAN WRAP) 11/30/20  Yes Brandon Saleem MD   ergocalciferol (ERGOCALCIFEROL) 1.25 MG (78229 UT) capsule TAKE 1 CAPSULE BY MOUTH EVERY 2 WEEKS 11/30/20  Yes Brandon Saleem MD   sevelamer (RENVELA) 800 MG tablet Take 1 tablet by mouth 3 times daily (with meals)   Yes Provider, MD Brandon        Allergies:       Sensipar  [cinacalcet] and Iodine    Physical Exam:     Vitals:  BP (!) 152/74   Pulse 62   Ht 1.626 m (5' 4.02\")   Wt 73 kg (161 lb)   SpO2 96%   BMI 27.62 kg/m²   Physical Exam  Constitutional:       Appearance: Normal appearance. He is well-developed. He is not ill-appearing.   Cardiovascular:      Rate and Rhythm: Normal rate and regular rhythm.      Heart sounds: Normal heart sounds.   Pulmonary:      Effort: Pulmonary effort is normal.      Breath sounds: Normal breath sounds.   Abdominal:      General: Bowel sounds are normal. There is no distension.      Palpations: Abdomen is soft.      Tenderness: There is no abdominal tenderness.   Psychiatric:         Mood and Affect: Mood normal.         Behavior: Behavior normal.         Data:     Lab Results   Component Value Date/Time     02/21/2022 04:41 AM    K 5.4 02/21/2022 04:41 AM    CL 91 02/21/2022 04:41 AM    CO2 23 02/21/2022 04:41 AM    BUN 60 02/21/2022 04:41 AM    CREATININE 14.25 02/21/2022 04:41 AM    GLUCOSE 120

## 2024-01-23 NOTE — PATIENT INSTRUCTIONS
contact your doctor if you have any problems.  Where can you learn more?  Go to https://www.[employer].net/patientEd and enter F075 to learn more about \"A Healthy Heart: Care Instructions.\"  Current as of: June 25, 2023               Content Version: 13.9  © 2993-5534 [employer].   Care instructions adapted under license by [employer]. If you have questions about a medical condition or this instruction, always ask your healthcare professional. [employer] disclaims any warranty or liability for your use of this information.      Personalized Preventive Plan for Estevan Marie - 1/23/2024  Medicare offers a range of preventive health benefits. Some of the tests and screenings are paid in full while other may be subject to a deductible, co-insurance, and/or copay.    Some of these benefits include a comprehensive review of your medical history including lifestyle, illnesses that may run in your family, and various assessments and screenings as appropriate.    After reviewing your medical record and screening and assessments performed today your provider may have ordered immunizations, labs, imaging, and/or referrals for you.  A list of these orders (if applicable) as well as your Preventive Care list are included within your After Visit Summary for your review.    Other Preventive Recommendations:    A preventive eye exam performed by an eye specialist is recommended every 1-2 years to screen for glaucoma; cataracts, macular degeneration, and other eye disorders.  A preventive dental visit is recommended every 6 months.  Try to get at least 150 minutes of exercise per week or 10,000 steps per day on a pedometer .  Order or download the FREE \"Exercise & Physical Activity: Your Everyday Guide\" from [employer]. Call 1-563.491.4944 or search [employer] online.  You need 7427-7508 mg of calcium and 3476-6824 IU of vitamin D per day. It is possible

## 2024-02-24 ENCOUNTER — HOSPITAL ENCOUNTER (EMERGENCY)
Age: 49
Discharge: HOME | End: 2024-02-24
Attending: EMERGENCY MEDICINE
Payer: MEDICARE

## 2024-02-24 VITALS
RESPIRATION RATE: 18 BRPM | BODY MASS INDEX: 27.83 KG/M2 | SYSTOLIC BLOOD PRESSURE: 158 MMHG | WEIGHT: 163 LBS | HEIGHT: 64 IN | HEART RATE: 62 BPM | OXYGEN SATURATION: 98 % | TEMPERATURE: 98.1 F | DIASTOLIC BLOOD PRESSURE: 88 MMHG

## 2024-02-24 DIAGNOSIS — M54.32 SCIATICA OF LEFT SIDE: Primary | ICD-10-CM

## 2024-02-24 DIAGNOSIS — M79.605 LEFT LEG PAIN: ICD-10-CM

## 2024-02-24 PROCEDURE — 99283 EMERGENCY DEPT VISIT LOW MDM: CPT

## 2024-02-24 RX ORDER — NAPROXEN 375 MG/1
375 TABLET ORAL 2 TIMES DAILY WITH MEALS
Qty: 30 TABLET | Refills: 1 | Status: SHIPPED | OUTPATIENT
Start: 2024-02-24

## 2024-02-24 ASSESSMENT — PAIN DESCRIPTION - ORIENTATION: ORIENTATION: LEFT

## 2024-02-24 ASSESSMENT — ENCOUNTER SYMPTOMS
CHOKING: 0
COLOR CHANGE: 0
BACK PAIN: 1

## 2024-02-24 ASSESSMENT — PAIN DESCRIPTION - LOCATION: LOCATION: LEG

## 2024-02-24 ASSESSMENT — PAIN SCALES - GENERAL: PAINLEVEL_OUTOF10: 6

## 2024-02-24 NOTE — ED PROVIDER NOTES
[facility] ED  eMERGENCY dEPARTMENT eNCOUnter      Pt Name: Estevan Marie  MRN: [id number]  Birthdate 1975  Date of evaluation: 2/24/2024  Provider: Jarvis Strong MD    CHIEF COMPLAINT       Chief Complaint   Patient presents with    Leg Pain     Left leg pain x 3 days  pt points to posterior left hip and down front of thigh, denies injury     Patient is a 48-year-old male who presents to the emergency department complaining of left back, buttock, and leg pain.  Patient states that started about 3 days ago.  He states it starts in his left buttock and radiates around to the front of his leg.  He denies any numbness tingling or weakness.  He denies any loss of bowel or bladder control.  He denies any fever or chills.  He denies any trauma.        Nursing Notes were reviewed.    REVIEW OF SYSTEMS    (2-9 systems for level 4, 10 or more for level 5)     Review of Systems   Constitutional:  Negative for chills and fever.   Respiratory:  Negative for choking.    Musculoskeletal:  Positive for back pain.   Skin:  Negative for color change and rash.   Neurological:  Negative for weakness and numbness.       Except as noted above the remainder of the review of systems was reviewed and negative.       PAST MEDICAL HISTORY     Past Medical History:   Diagnosis Date    End-stage renal disease on hemodialysis (HCC)     since 2006, due to polycystic kidney disease    Hypertension     Hypertriglyceridemia          SURGICAL HISTORY       Past Surgical History:   Procedure Laterality Date    CHOLECYSTECTOMY      CHOLECYSTECTOMY, LAPAROSCOPIC N/A 4/13/2022    CHOLECYSTECTOMY LAPAROSCOPIC ROBOTIC performed by Philip Ross MD at [facility] OR    DIALYSIS FISTULA CREATION      THYROID LOBECTOMY  2012    [facility]         ALLERGIES     Sensipar  [cinacalcet] and Iodine    FAMILY HISTORY     History reviewed. No pertinent family history.       SOCIAL HISTORY       Social History     Socioeconomic History    Marital status:

## 2024-06-27 ENCOUNTER — OFFICE VISIT (OUTPATIENT)
Dept: FAMILY MEDICINE CLINIC | Age: 49
End: 2024-06-27

## 2024-06-27 VITALS
SYSTOLIC BLOOD PRESSURE: 110 MMHG | BODY MASS INDEX: 28.17 KG/M2 | HEART RATE: 64 BPM | DIASTOLIC BLOOD PRESSURE: 74 MMHG | WEIGHT: 165 LBS | HEIGHT: 64 IN | OXYGEN SATURATION: 97 %

## 2024-06-27 DIAGNOSIS — F51.01 PRIMARY INSOMNIA: Primary | ICD-10-CM

## 2024-06-27 DIAGNOSIS — M79.2 NEUROPATHIC PAIN: ICD-10-CM

## 2024-06-27 DIAGNOSIS — N18.6 ESRD (END STAGE RENAL DISEASE) ON DIALYSIS (HCC): ICD-10-CM

## 2024-06-27 DIAGNOSIS — K52.1 DIARRHEA DUE TO DRUG: ICD-10-CM

## 2024-06-27 DIAGNOSIS — Z99.2 ESRD (END STAGE RENAL DISEASE) ON DIALYSIS (HCC): ICD-10-CM

## 2024-06-27 RX ORDER — TENAPANOR 31.9 MG/1
1 TABLET, FILM COATED ORAL
COMMUNITY
Start: 2024-06-01

## 2024-06-27 RX ORDER — LANOLIN ALCOHOL/MO/W.PET/CERES
3-6 CREAM (GRAM) TOPICAL NIGHTLY PRN
Qty: 60 TABLET | Refills: 2 | Status: SHIPPED | OUTPATIENT
Start: 2024-06-27

## 2024-06-27 RX ORDER — ASCORBIC ACID, THIAMINE MONONITRATE,RIBOFLAVIN, NIACINAMIDE, PYRIDOXINE HYDROCHLORIDE, FOLIC ACID, CYANOCOBALAMIN, BIOTIN, CALCIUM PANTOTHENATE, 100; 1.5; 1.7; 20; 10; 1; 6000; 150000; 5 MG/1; MG/1; MG/1; MG/1; MG/1; MG/1; UG/1; UG/1; MG/1
1 CAPSULE, LIQUID FILLED ORAL DAILY
COMMUNITY

## 2024-06-27 RX ORDER — CINACALCET 90 MG/1
TABLET, FILM COATED ORAL
COMMUNITY

## 2024-06-27 RX ORDER — GABAPENTIN 100 MG/1
100 CAPSULE ORAL DAILY
Qty: 90 CAPSULE | Refills: 1 | Status: SHIPPED | OUTPATIENT
Start: 2024-06-27 | End: 2024-12-24

## 2024-06-27 RX ORDER — SODIUM BICARBONATE 650 MG/1
1 TABLET ORAL
COMMUNITY
Start: 2024-06-14

## 2024-06-27 ASSESSMENT — PATIENT HEALTH QUESTIONNAIRE - PHQ9
SUM OF ALL RESPONSES TO PHQ9 QUESTIONS 1 & 2: 0
SUM OF ALL RESPONSES TO PHQ QUESTIONS 1-9: 0
SUM OF ALL RESPONSES TO PHQ QUESTIONS 1-9: 0
2. FEELING DOWN, DEPRESSED OR HOPELESS: NOT AT ALL
SUM OF ALL RESPONSES TO PHQ QUESTIONS 1-9: 0
SUM OF ALL RESPONSES TO PHQ QUESTIONS 1-9: 0

## 2024-06-27 NOTE — PATIENT INSTRUCTIONS
[employer] SURVEY:    You may be receiving a survey from [employer] regarding your visit today.    You may get this in the mail, through your MyChart or in your email.     Please complete the survey to enable us to provide the highest quality of care to you and your family.    If you cannot score us as very good ( 5 Stars) on any question, please feel free to call the office to discuss how we could have made your experience exceptional.     Thank you.    Clinical Care Team:   DO Franklin Andrews CMA                                     Triage: Trinity Champagne CMA              Clerical Team:    Trinity Hair     [place] [phone]           Billing questions: [phone]           Medical Records Request: [phone]

## 2024-06-27 NOTE — PROGRESS NOTES
SURVEY:    You may be receiving a survey from [facility] regarding your visit today.    You may get this in the mail, through your MyChart or in your email.     Please complete the survey to enable us to provide the highest quality of care to you and your family.    If you cannot score us as very good ( 5 Stars) on any question, please feel free to call the office to discuss how we could have made your experience exceptional.     Thank you.    Clinical Care Team:   DO Franklin Andrews CMA                                     Triage: Trinity Champagne CMA              Clerical Team:    Trinity Hair     [place] [phone]           Billing questions: [phone]           Medical Records Request: [phone]           signed by Marley Simmons DO on 2024 at 11:36 PM  [facility]  [address] [address]  Dept: [phone]

## 2024-12-26 ENCOUNTER — OFFICE VISIT (OUTPATIENT)
Dept: FAMILY MEDICINE CLINIC | Age: 49
End: 2024-12-26
Payer: MEDICARE

## 2024-12-26 ENCOUNTER — HOSPITAL ENCOUNTER (OUTPATIENT)
Age: 49
Discharge: HOME | End: 2024-12-26
Payer: MEDICARE

## 2024-12-26 VITALS
BODY MASS INDEX: 28.68 KG/M2 | DIASTOLIC BLOOD PRESSURE: 74 MMHG | SYSTOLIC BLOOD PRESSURE: 120 MMHG | WEIGHT: 168 LBS | HEIGHT: 64 IN | OXYGEN SATURATION: 99 % | HEART RATE: 66 BPM

## 2024-12-26 DIAGNOSIS — Z12.11 ENCOUNTER FOR COLORECTAL CANCER SCREENING: ICD-10-CM

## 2024-12-26 DIAGNOSIS — Z13.6 SCREENING FOR CARDIOVASCULAR CONDITION: ICD-10-CM

## 2024-12-26 DIAGNOSIS — R68.89 OTHER GENERAL SYMPTOMS AND SIGNS: ICD-10-CM

## 2024-12-26 DIAGNOSIS — N18.6 ESRD (END STAGE RENAL DISEASE) ON DIALYSIS (HCC): Primary | ICD-10-CM

## 2024-12-26 DIAGNOSIS — I15.9 SECONDARY HYPERTENSION: ICD-10-CM

## 2024-12-26 DIAGNOSIS — Z12.12 ENCOUNTER FOR COLORECTAL CANCER SCREENING: ICD-10-CM

## 2024-12-26 DIAGNOSIS — Z99.2 ESRD (END STAGE RENAL DISEASE) ON DIALYSIS (HCC): ICD-10-CM

## 2024-12-26 DIAGNOSIS — N18.6 ESRD (END STAGE RENAL DISEASE) ON DIALYSIS (HCC): ICD-10-CM

## 2024-12-26 DIAGNOSIS — Z99.2 ESRD (END STAGE RENAL DISEASE) ON DIALYSIS (HCC): Primary | ICD-10-CM

## 2024-12-26 LAB
CHOLEST SERPL-MCNC: 159 MG/DL (ref 0–199)
CHOLESTEROL/HDL RATIO: 5.5
HDLC SERPL-MCNC: 29 MG/DL
LDLC SERPL CALC-MCNC: 53 MG/DL (ref 0–100)
TRIGL SERPL-MCNC: 383 MG/DL
TSH SERPL DL<=0.05 MIU/L-ACNC: 2.14 UIU/ML (ref 0.3–5)
VLDLC SERPL CALC-MCNC: 77 MG/DL (ref 1–30)

## 2024-12-26 PROCEDURE — 84443 ASSAY THYROID STIM HORMONE: CPT

## 2024-12-26 PROCEDURE — 36415 COLL VENOUS BLD VENIPUNCTURE: CPT

## 2024-12-26 PROCEDURE — 80061 LIPID PANEL: CPT

## 2024-12-26 PROCEDURE — 99213 OFFICE O/P EST LOW 20 MIN: CPT | Performed by: FAMILY MEDICINE

## 2024-12-26 RX ORDER — FERRIC CITRATE 210 MG/1
2 TABLET, COATED ORAL
COMMUNITY
Start: 2024-08-14

## 2024-12-26 RX ORDER — SUCROFERRIC OXYHYDROXIDE 500 MG/1
TABLET, CHEWABLE ORAL
COMMUNITY
Start: 2024-12-20

## 2024-12-26 RX ORDER — SEVELAMER CARBONATE 800 MG/1
TABLET, FILM COATED ORAL
COMMUNITY
Start: 2024-11-25

## 2024-12-26 ASSESSMENT — PATIENT HEALTH QUESTIONNAIRE - PHQ9
SUM OF ALL RESPONSES TO PHQ QUESTIONS 1-9: 0
1. LITTLE INTEREST OR PLEASURE IN DOING THINGS: NOT AT ALL
SUM OF ALL RESPONSES TO PHQ QUESTIONS 1-9: 0
SUM OF ALL RESPONSES TO PHQ QUESTIONS 1-9: 0
2. FEELING DOWN, DEPRESSED OR HOPELESS: NOT AT ALL
SUM OF ALL RESPONSES TO PHQ QUESTIONS 1-9: 0
SUM OF ALL RESPONSES TO PHQ9 QUESTIONS 1 & 2: 0

## 2024-12-26 NOTE — PROGRESS NOTES
Name: Estevan Tom  : 1975         Chief Complaint:     Chief Complaint   Patient presents with    Hypertension    Insomnia       History of Present Illness:      Estevan Tom is a 49 y.o.  male who presents with Hypertension and Insomnia      HPI    Feeling better overall, less pain in legs, doing better at work. Unsure what has helped. HD has been going ok - bleeding if they poke a certain site so he tries to have them avoid those areas. Undergoing transplant eval - results of screening tests to [phone]. Due for CRC screening. No known family history of colon cancer. Re bowel cleanout, pt doesn't know of a particular fluid restriction but says he's not supposed to drink too much fluid.     Medical History:     Patient Active Problem List   Diagnosis    ESRD (end stage renal disease) on dialysis (HCC)    Secondary hypertension    Hypertriglyceridemia    Vitamin D deficiency    Chronic glomerulonephritis with pathological lesion in kidney    Secondary hyperparathyroidism (HCC)    Polycystic kidney disease       Medications:       Prior to Admission medications    Medication Sig Start Date End Date Taking? Authorizing Provider   ferric citrate (AURYXIA) 1  MG(Fe) TABS tablet Take 2 tablets by mouth 3 times daily (with meals) 24  Yes ProviderBrandon MD   sevelamer (RENVELA) 800 MG tablet TAKE 3 TABLETS BY MOUTH THREE TIMES DAILY WITH MEALS 24  Yes Brandon Saleem MD   VELPHORO 500 MG CHEW chewable tablet  24  Yes ProviderBrandon MD   sodium bicarbonate 650 MG tablet Take 1 tablet by mouth 24   ProviderBrandon MD   melatonin 3 MG TABS tablet Take 1-2 tablets by mouth nightly as needed (sleep) 24   Marley Simmons DO   amLODIPine (NORVASC) 10 MG tablet Take 1 tablet by mouth daily 24   Marley Simmons DO   metoprolol succinate (TOPROL XL) 100 MG extended release tablet Take 1 tablet by mouth daily 24   Marley Simmons

## 2024-12-27 ENCOUNTER — TELEPHONE (OUTPATIENT)
Dept: FAMILY MEDICINE CLINIC | Age: 49
End: 2024-12-27

## 2024-12-27 NOTE — TELEPHONE ENCOUNTER
----- Message from Dr. Marley Simmons DO sent at 12/26/2024  4:19 PM EST -----  Blood tests ok, thyroid normal. A type of cholesterol called triglycerides is high and good cholesterol is low - just try for healthy diet and regular exercise.

## 2025-05-12 ENCOUNTER — HOSPITAL ENCOUNTER (EMERGENCY)
Age: 50
Discharge: HOME | End: 2025-05-12
Attending: EMERGENCY MEDICINE
Payer: MEDICARE

## 2025-05-12 ENCOUNTER — APPOINTMENT (OUTPATIENT)
Dept: CT IMAGING | Age: 50
End: 2025-05-12
Payer: MEDICARE

## 2025-05-12 VITALS
TEMPERATURE: 98.7 F | DIASTOLIC BLOOD PRESSURE: 60 MMHG | OXYGEN SATURATION: 96 % | WEIGHT: 161 LBS | HEART RATE: 83 BPM | HEIGHT: 64 IN | BODY MASS INDEX: 27.49 KG/M2 | SYSTOLIC BLOOD PRESSURE: 89 MMHG | RESPIRATION RATE: 20 BRPM

## 2025-05-12 DIAGNOSIS — K57.32 DIVERTICULITIS OF COLON: ICD-10-CM

## 2025-05-12 DIAGNOSIS — R10.11 ABDOMINAL PAIN, RIGHT UPPER QUADRANT: Primary | ICD-10-CM

## 2025-05-12 LAB
ALBUMIN SERPL-MCNC: 4.7 G/DL (ref 3.5–5.2)
ALBUMIN/GLOB SERPL: 1.3 {RATIO} (ref 1–2.5)
ALP SERPL-CCNC: 102 U/L (ref 40–129)
ALT SERPL-CCNC: 10 U/L (ref 5–41)
ANION GAP SERPL CALCULATED.3IONS-SCNC: 18 MMOL/L (ref 9–17)
AST SERPL-CCNC: 10 U/L
BASOPHILS # BLD: 0.02 K/UL (ref 0–0.2)
BASOPHILS NFR BLD: 0 % (ref 0–2)
BILIRUB SERPL-MCNC: 0.6 MG/DL (ref 0.3–1.2)
BUN SERPL-MCNC: 41 MG/DL (ref 6–20)
CALCIUM SERPL-MCNC: 9.3 MG/DL (ref 8.6–10.4)
CHLORIDE SERPL-SCNC: 93 MMOL/L (ref 98–107)
CO2 SERPL-SCNC: 27 MMOL/L (ref 20–31)
CREAT SERPL-MCNC: 9.6 MG/DL (ref 0.7–1.2)
EOSINOPHIL # BLD: 0.07 K/UL (ref 0–0.4)
EOSINOPHILS RELATIVE PERCENT: 1 % (ref 0–5)
ERYTHROCYTE [DISTWIDTH] IN BLOOD BY AUTOMATED COUNT: 14.5 % (ref 12.1–15.2)
GFR, ESTIMATED: 6 ML/MIN/1.73M2
GLUCOSE SERPL-MCNC: 123 MG/DL (ref 70–99)
HCT VFR BLD AUTO: 41.1 % (ref 41–53)
HGB BLD-MCNC: 13.6 G/DL (ref 13.5–17.5)
IMM GRANULOCYTES # BLD AUTO: 0.02 K/UL (ref 0–0.3)
IMM GRANULOCYTES NFR BLD: 0 % (ref 0–5)
LIPASE SERPL-CCNC: 63 U/L (ref 13–60)
LYMPHOCYTES NFR BLD: 1.46 K/UL (ref 1–4.8)
LYMPHOCYTES RELATIVE PERCENT: 16 % (ref 13–44)
MCH RBC QN AUTO: 29.8 PG (ref 26–34)
MCHC RBC AUTO-ENTMCNC: 33.1 G/DL (ref 31–37)
MCV RBC AUTO: 89.9 FL (ref 80–100)
MONOCYTES NFR BLD: 0.76 K/UL (ref 0–1)
MONOCYTES NFR BLD: 8 % (ref 5–9)
NEUTROPHILS NFR BLD: 75 % (ref 39–75)
NEUTS SEG NFR BLD: 7.01 K/UL (ref 2.1–6.5)
PLATELET # BLD AUTO: 233 K/UL (ref 140–450)
PMV BLD AUTO: 10.5 FL (ref 6–12)
POTASSIUM SERPL-SCNC: 4.3 MMOL/L (ref 3.7–5.3)
PROT SERPL-MCNC: 8.4 G/DL (ref 6.4–8.3)
RBC # BLD AUTO: 4.57 M/UL (ref 4.5–5.9)
SODIUM SERPL-SCNC: 138 MMOL/L (ref 135–144)
WBC OTHER # BLD: 9.3 K/UL (ref 3.5–11)

## 2025-05-12 PROCEDURE — 85025 COMPLETE CBC W/AUTO DIFF WBC: CPT

## 2025-05-12 PROCEDURE — 74176 CT ABD & PELVIS W/O CONTRAST: CPT

## 2025-05-12 PROCEDURE — 99284 EMERGENCY DEPT VISIT MOD MDM: CPT

## 2025-05-12 PROCEDURE — 83690 ASSAY OF LIPASE: CPT

## 2025-05-12 PROCEDURE — 80053 COMPREHEN METABOLIC PANEL: CPT

## 2025-05-12 RX ORDER — HYDRALAZINE HYDROCHLORIDE 10 MG/1
10 TABLET, FILM COATED ORAL 2 TIMES DAILY
COMMUNITY

## 2025-05-12 RX ORDER — POLYETHYLENE GLYCOL 3350 17 G/17G
17 POWDER, FOR SOLUTION ORAL DAILY PRN
Qty: 1530 G | Refills: 1 | Status: SHIPPED | OUTPATIENT
Start: 2025-05-12 | End: 2025-06-11

## 2025-05-12 RX ORDER — CALCIUM ACETATE 667 MG/1
2 TABLET ORAL 3 TIMES DAILY
COMMUNITY

## 2025-05-12 RX ORDER — AMOXICILLIN AND CLAVULANATE POTASSIUM 500; 125 MG/1; MG/1
1 TABLET, FILM COATED ORAL 2 TIMES DAILY
Qty: 14 TABLET | Refills: 0 | Status: SHIPPED | OUTPATIENT
Start: 2025-05-12 | End: 2025-05-19

## 2025-05-12 RX ORDER — CALCIUM CARBONATE 500 MG/1
1 TABLET, CHEWABLE ORAL 2 TIMES DAILY
COMMUNITY

## 2025-05-12 RX ORDER — CARVEDILOL 25 MG/1
25 TABLET ORAL 2 TIMES DAILY WITH MEALS
COMMUNITY

## 2025-05-12 RX ORDER — CALCITRIOL 0.25 UG/1
0.75 CAPSULE, LIQUID FILLED ORAL 2 TIMES DAILY
COMMUNITY

## 2025-05-12 ASSESSMENT — PAIN SCALES - GENERAL: PAINLEVEL_OUTOF10: 8

## 2025-05-12 ASSESSMENT — PAIN - FUNCTIONAL ASSESSMENT: PAIN_FUNCTIONAL_ASSESSMENT: 0-10

## 2025-05-12 ASSESSMENT — PAIN DESCRIPTION - PAIN TYPE: TYPE: ACUTE PAIN

## 2025-05-12 ASSESSMENT — PAIN DESCRIPTION - FREQUENCY: FREQUENCY: INTERMITTENT

## 2025-05-12 ASSESSMENT — LIFESTYLE VARIABLES
HOW OFTEN DO YOU HAVE A DRINK CONTAINING ALCOHOL: NEVER
HOW MANY STANDARD DRINKS CONTAINING ALCOHOL DO YOU HAVE ON A TYPICAL DAY: PATIENT DOES NOT DRINK

## 2025-05-12 ASSESSMENT — PAIN DESCRIPTION - LOCATION: LOCATION: ABDOMEN

## 2025-05-12 ASSESSMENT — PAIN DESCRIPTION - DESCRIPTORS: DESCRIPTORS: ACHING;CRAMPING

## 2025-05-12 ASSESSMENT — PAIN DESCRIPTION - ORIENTATION: ORIENTATION: RIGHT;UPPER;LOWER

## 2025-05-12 NOTE — ED PROVIDER NOTES
eMERGENCY dEPARTMENT eNCOUnter      CHIEF COMPLAINT    Chief Complaint   Patient presents with    Abdominal Pain     Abdominal pain to RLQ for the past few days. Feels constipated and bloated. Last bowel movement yesterday around 0400.        HPI    Estevan Tom is a 49 y.o. male who presents to ED from home with right mid abdominal pain since yesterday.  Patient has history of renal disease on hemodialysis.  Patient also complains of constipation.      REVIEW OF SYSTEMS    All systems reviewed and positives are in the HPI    PAST MEDICAL HISTORY    Past Medical History:   Diagnosis Date    End-stage renal disease on hemodialysis (HCC)     since 2006, due to polycystic kidney disease    Hypertension     Hypertriglyceridemia        SURGICAL HISTORY    Past Surgical History:   Procedure Laterality Date    CHOLECYSTECTOMY      CHOLECYSTECTOMY, LAPAROSCOPIC N/A 4/13/2022    CHOLECYSTECTOMY LAPAROSCOPIC ROBOTIC performed by Philip Ross MD at [facility] OR    DIALYSIS FISTULA CREATION      THYROID LOBECTOMY  2012    [facility]       CURRENT MEDICATIONS    Current Outpatient Rx   Medication Sig Dispense Refill    calcitRIOL (ROCALTROL) 0.25 MCG capsule Take 3 capsules by mouth 2 times daily      calcium acetate 667 MG TABS Take 2 tablets by mouth 3 times daily      carvedilol (COREG) 25 MG tablet Take 1 tablet by mouth 2 times daily (with meals)      hydrALAZINE (APRESOLINE) 10 MG tablet Take 1 tablet by mouth 2 times daily      calcium carbonate (TUMS) 500 MG chewable tablet Take 1 tablet by mouth 2 times daily      amoxicillin-clavulanate (AUGMENTIN) 500-125 MG per tablet Take 1 tablet by mouth 2 times daily for 7 days 14 tablet 0    polyethylene glycol (GLYCOLAX) 17 GM/SCOOP powder Take 17 g by mouth daily as needed (For constipation) 1530 g 1    sevelamer (RENVELA) 800 MG tablet TAKE 3 TABLETS BY MOUTH THREE TIMES DAILY WITH MEALS      sodium bicarbonate 650 MG tablet Take 1 tablet by mouth 2 times daily

## 2025-05-12 NOTE — ED NOTES
Patient denies any symptoms of hypotension at this time. Stated he did drop after dialysis today but that is normal for him. Dr. Henao aware. No new orders received.

## 2025-08-25 ENCOUNTER — OFFICE VISIT (OUTPATIENT)
Dept: FAMILY MEDICINE CLINIC | Age: 50
End: 2025-08-25
Payer: MEDICARE

## 2025-08-25 VITALS
OXYGEN SATURATION: 94 % | WEIGHT: 160 LBS | HEART RATE: 81 BPM | DIASTOLIC BLOOD PRESSURE: 80 MMHG | SYSTOLIC BLOOD PRESSURE: 110 MMHG | BODY MASS INDEX: 27.46 KG/M2

## 2025-08-25 DIAGNOSIS — R09.89 DECREASED PEDAL PULSES: ICD-10-CM

## 2025-08-25 DIAGNOSIS — Z99.2 ESRD (END STAGE RENAL DISEASE) ON DIALYSIS (HCC): ICD-10-CM

## 2025-08-25 DIAGNOSIS — N18.6 ESRD (END STAGE RENAL DISEASE) ON DIALYSIS (HCC): ICD-10-CM

## 2025-08-25 DIAGNOSIS — L03.032 PARONYCHIA OF SECOND TOE OF LEFT FOOT: Primary | ICD-10-CM

## 2025-08-25 PROCEDURE — 99214 OFFICE O/P EST MOD 30 MIN: CPT | Performed by: STUDENT IN AN ORGANIZED HEALTH CARE EDUCATION/TRAINING PROGRAM

## 2025-08-25 RX ORDER — MUPIROCIN 2 %
OINTMENT (GRAM) TOPICAL
Qty: 30 G | Refills: 1 | Status: SHIPPED | OUTPATIENT
Start: 2025-08-25 | End: 2025-09-04

## 2025-08-25 SDOH — ECONOMIC STABILITY: FOOD INSECURITY: WITHIN THE PAST 12 MONTHS, YOU WORRIED THAT YOUR FOOD WOULD RUN OUT BEFORE YOU GOT MONEY TO BUY MORE.: NEVER TRUE

## 2025-08-25 SDOH — ECONOMIC STABILITY: FOOD INSECURITY: WITHIN THE PAST 12 MONTHS, THE FOOD YOU BOUGHT JUST DIDN'T LAST AND YOU DIDN'T HAVE MONEY TO GET MORE.: NEVER TRUE

## 2025-08-25 ASSESSMENT — PATIENT HEALTH QUESTIONNAIRE - PHQ9
2. FEELING DOWN, DEPRESSED OR HOPELESS: NOT AT ALL
SUM OF ALL RESPONSES TO PHQ QUESTIONS 1-9: 0
SUM OF ALL RESPONSES TO PHQ QUESTIONS 1-9: 0
1. LITTLE INTEREST OR PLEASURE IN DOING THINGS: NOT AT ALL
SUM OF ALL RESPONSES TO PHQ QUESTIONS 1-9: 0
SUM OF ALL RESPONSES TO PHQ QUESTIONS 1-9: 0

## 2025-08-25 ASSESSMENT — ENCOUNTER SYMPTOMS
SORE THROAT: 0
WHEEZING: 0
COUGH: 0
SINUS PAIN: 0
ABDOMINAL PAIN: 0
COLOR CHANGE: 1
BACK PAIN: 0
NAUSEA: 0
DIARRHEA: 0
VOMITING: 0

## 2025-08-29 ENCOUNTER — HOSPITAL ENCOUNTER (OUTPATIENT)
Dept: VASCULAR LAB | Age: 50
Discharge: HOME | End: 2025-08-31
Attending: STUDENT IN AN ORGANIZED HEALTH CARE EDUCATION/TRAINING PROGRAM
Payer: MEDICARE

## 2025-08-29 DIAGNOSIS — R09.89 DECREASED PEDAL PULSES: ICD-10-CM

## 2025-08-29 LAB
VAS LEFT ARM BP: 0 MMHG
VAS LEFT DORSALIS PEDIS BP: 0 MMHG
VAS LEFT PTA BP: 254 MMHG
VAS RIGHT ARM BP: 123 MMHG
VAS RIGHT DORSALIS PEDIS BP: 254 MMHG
VAS RIGHT PTA BP: 0 MMHG

## 2025-08-29 PROCEDURE — 93922 UPR/L XTREMITY ART 2 LEVELS: CPT

## (undated) DEVICE — TROCARS: Brand: KII® BALLOON BLUNT TIP SYSTEM

## (undated) DEVICE — SUTURE SZ 0 27IN 5/8 CIR UR-6  TAPER PT VIOLET ABSRB VICRYL J603H

## (undated) DEVICE — PENCIL ES L3M BTTN SWCH HOLSTER W/ BLDE ELECTRD EDGE

## (undated) DEVICE — Device

## (undated) DEVICE — DRAPE,UTILTY,TAPE,15X26, 4EA/PK: Brand: MEDLINE

## (undated) DEVICE — ELECTRO LUBE IS A SINGLE PATIENT USE DEVICE THAT IS INTENDED TO BE USED ON ELECTROSURGICAL ELECTRODES TO REDUCE STICKING.: Brand: KEY SURGICAL ELECTRO LUBE

## (undated) DEVICE — SOLUTION IV IRRIG POUR BRL 0.9% SODIUM CHL 2F7124

## (undated) DEVICE — 40595 XL TRENDELENBURG POSITIONING KIT: Brand: 40595 XL TRENDELENBURG POSITIONING KIT

## (undated) DEVICE — DRESSING SURESITE-123PLUSPAD 2.4 IN X2.8 IN

## (undated) DEVICE — SUCTION IRRIGATOR: Brand: ENDOWRIST

## (undated) DEVICE — PLUMEPORT LAPAROSCOPIC SMOKE FILTRATION DEVICE: Brand: PLUMEPORT ACTIV

## (undated) DEVICE — 3M™ TEGADERM™ +PAD FILM DRESSING WITH NON-ADHERENT PAD, 3587, 3-1/2 IN X 4-1/8 IN (9 CM X 10.5 CM), 25/CAR, 4 CAR/CS: Brand: 3M™ TEGADERM™

## (undated) DEVICE — INSUFFLATION NEEDLE TO ESTABLISH PNEUMOPERITONEUM.: Brand: INSUFFLATION NEEDLE

## (undated) DEVICE — BAG SPEC REM 224ML W4XL6IN DIA10MM 1 HND GYN DISP ENDOPCH

## (undated) DEVICE — ARM DRAPE

## (undated) DEVICE — CLIP INT L POLYMER LOK LIG HEM O LOK

## (undated) DEVICE — INTENDED FOR TISSUE SEPARATION, AND OTHER PROCEDURES THAT REQUIRE A SHARP SURGICAL BLADE TO PUNCTURE OR CUT.: Brand: BARD-PARKER ® STAINLESS STEEL BLADES

## (undated) DEVICE — CANNULA SEAL

## (undated) DEVICE — 3M™ PRECISE™ VISTA DISPOSABLE SKIN STAPLER 3995: Brand: 3M™ PRECISE™

## (undated) DEVICE — SYRINGE MED 10ML LUERLOCK TIP W/O SFTY DISP

## (undated) DEVICE — BLADELESS OBTURATOR: Brand: WECK VISTA

## (undated) DEVICE — GLOVE ORANGE PI 7 1/2   MSG9075